# Patient Record
Sex: MALE | Race: WHITE | NOT HISPANIC OR LATINO | Employment: OTHER | URBAN - METROPOLITAN AREA
[De-identification: names, ages, dates, MRNs, and addresses within clinical notes are randomized per-mention and may not be internally consistent; named-entity substitution may affect disease eponyms.]

---

## 2017-03-22 ENCOUNTER — HOSPITAL ENCOUNTER (OUTPATIENT)
Facility: HOSPITAL | Age: 67
Setting detail: OBSERVATION
Discharge: HOME | End: 2017-03-24
Attending: EMERGENCY MEDICINE | Admitting: HOSPITALIST

## 2017-03-22 ENCOUNTER — APPOINTMENT (OUTPATIENT)
Dept: GENERAL RADIOLOGY | Facility: HOSPITAL | Age: 67
End: 2017-03-22

## 2017-03-22 DIAGNOSIS — R07.2 PRECORDIAL CHEST PAIN: ICD-10-CM

## 2017-03-22 DIAGNOSIS — J10.1 INFLUENZA A (H1N1): Primary | ICD-10-CM

## 2017-03-22 LAB
ALBUMIN SERPL-MCNC: 4.3 G/DL (ref 3.5–5.2)
ALBUMIN/GLOB SERPL: 1.7 G/DL
ALP SERPL-CCNC: 69 U/L (ref 39–117)
ALT SERPL W P-5'-P-CCNC: 23 U/L (ref 1–41)
ANION GAP SERPL CALCULATED.3IONS-SCNC: 13.7 MMOL/L
AST SERPL-CCNC: 21 U/L (ref 1–40)
B PERT DNA SPEC QL NAA+PROBE: NOT DETECTED
BASOPHILS # BLD AUTO: 0 10*3/MM3 (ref 0–0.2)
BASOPHILS NFR BLD AUTO: 0 % (ref 0–1.5)
BILIRUB SERPL-MCNC: 0.6 MG/DL (ref 0.1–1.2)
BUN BLD-MCNC: 18 MG/DL (ref 8–23)
BUN/CREAT SERPL: 17.3 (ref 7–25)
C PNEUM DNA NPH QL NAA+NON-PROBE: NOT DETECTED
CALCIUM SPEC-SCNC: 9.4 MG/DL (ref 8.6–10.5)
CHLORIDE SERPL-SCNC: 102 MMOL/L (ref 98–107)
CK MB SERPL-CCNC: 1.32 NG/ML
CK SERPL-CCNC: 53 U/L (ref 20–200)
CO2 SERPL-SCNC: 24.3 MMOL/L (ref 22–29)
CREAT BLD-MCNC: 1.04 MG/DL (ref 0.76–1.27)
D DIMER PPP FEU-MCNC: <0.27 MCGFEU/ML (ref 0–0.49)
D-LACTATE SERPL-SCNC: 0.9 MMOL/L (ref 0.5–2)
DEPRECATED RDW RBC AUTO: 41.4 FL (ref 37–54)
EOSINOPHIL # BLD AUTO: 0 10*3/MM3 (ref 0–0.7)
EOSINOPHIL NFR BLD AUTO: 0 % (ref 0.3–6.2)
ERYTHROCYTE [DISTWIDTH] IN BLOOD BY AUTOMATED COUNT: 12.6 % (ref 11.5–14.5)
FLUAV H1 2009 PAND RNA NPH QL NAA+PROBE: DETECTED
FLUAV H1 HA GENE NPH QL NAA+PROBE: NOT DETECTED
FLUAV H3 RNA NPH QL NAA+PROBE: NOT DETECTED
FLUAV SUBTYP SPEC NAA+PROBE: NOT DETECTED
FLUBV RNA ISLT QL NAA+PROBE: NOT DETECTED
GFR SERPL CREATININE-BSD FRML MDRD: 72 ML/MIN/1.73
GLOBULIN UR ELPH-MCNC: 2.6 GM/DL
GLUCOSE BLD-MCNC: 105 MG/DL (ref 65–99)
HADV DNA SPEC NAA+PROBE: NOT DETECTED
HCOV 229E RNA SPEC QL NAA+PROBE: NOT DETECTED
HCOV HKU1 RNA SPEC QL NAA+PROBE: NOT DETECTED
HCOV NL63 RNA SPEC QL NAA+PROBE: NOT DETECTED
HCOV OC43 RNA SPEC QL NAA+PROBE: NOT DETECTED
HCT VFR BLD AUTO: 42.6 % (ref 40.4–52.2)
HGB BLD-MCNC: 14.4 G/DL (ref 13.7–17.6)
HMPV RNA NPH QL NAA+NON-PROBE: NOT DETECTED
HPIV1 RNA SPEC QL NAA+PROBE: NOT DETECTED
HPIV2 RNA SPEC QL NAA+PROBE: NOT DETECTED
HPIV3 RNA NPH QL NAA+PROBE: NOT DETECTED
HPIV4 P GENE NPH QL NAA+PROBE: NOT DETECTED
IMM GRANULOCYTES # BLD: 0 10*3/MM3 (ref 0–0.03)
IMM GRANULOCYTES NFR BLD: 0 % (ref 0–0.5)
INR PPP: 1.04 (ref 0.9–1.1)
LYMPHOCYTES # BLD AUTO: 0.6 10*3/MM3 (ref 0.9–4.8)
LYMPHOCYTES NFR BLD AUTO: 9.4 % (ref 19.6–45.3)
M PNEUMO IGG SER IA-ACNC: NOT DETECTED
MAGNESIUM SERPL-MCNC: 1.9 MG/DL (ref 1.6–2.4)
MCH RBC QN AUTO: 30.6 PG (ref 27–32.7)
MCHC RBC AUTO-ENTMCNC: 33.8 G/DL (ref 32.6–36.4)
MCV RBC AUTO: 90.4 FL (ref 79.8–96.2)
MONOCYTES # BLD AUTO: 0.91 10*3/MM3 (ref 0.2–1.2)
MONOCYTES NFR BLD AUTO: 14.2 % (ref 5–12)
NEUTROPHILS # BLD AUTO: 4.9 10*3/MM3 (ref 1.9–8.1)
NEUTROPHILS NFR BLD AUTO: 76.4 % (ref 42.7–76)
NT-PROBNP SERPL-MCNC: 35.2 PG/ML (ref 0–900)
PLATELET # BLD AUTO: 127 10*3/MM3 (ref 140–500)
PMV BLD AUTO: 11.5 FL (ref 6–12)
POTASSIUM BLD-SCNC: 4 MMOL/L (ref 3.5–5.2)
PROCALCITONIN SERPL-MCNC: 0.07 NG/ML (ref 0.1–0.25)
PROT SERPL-MCNC: 6.9 G/DL (ref 6–8.5)
PROTHROMBIN TIME: 13.2 SECONDS (ref 11.7–14.2)
RBC # BLD AUTO: 4.71 10*6/MM3 (ref 4.6–6)
RHINOVIRUS RNA SPEC NAA+PROBE: NOT DETECTED
RSV RNA NPH QL NAA+NON-PROBE: NOT DETECTED
SODIUM BLD-SCNC: 140 MMOL/L (ref 136–145)
TROPONIN T SERPL-MCNC: <0.01 NG/ML (ref 0–0.03)
WBC NRBC COR # BLD: 6.41 10*3/MM3 (ref 4.5–10.7)

## 2017-03-22 PROCEDURE — 83880 ASSAY OF NATRIURETIC PEPTIDE: CPT | Performed by: EMERGENCY MEDICINE

## 2017-03-22 PROCEDURE — 85379 FIBRIN DEGRADATION QUANT: CPT | Performed by: EMERGENCY MEDICINE

## 2017-03-22 PROCEDURE — 84145 PROCALCITONIN (PCT): CPT | Performed by: EMERGENCY MEDICINE

## 2017-03-22 PROCEDURE — 84484 ASSAY OF TROPONIN QUANT: CPT | Performed by: EMERGENCY MEDICINE

## 2017-03-22 PROCEDURE — 25010000002 ONDANSETRON PER 1 MG

## 2017-03-22 PROCEDURE — 82550 ASSAY OF CK (CPK): CPT | Performed by: EMERGENCY MEDICINE

## 2017-03-22 PROCEDURE — 94640 AIRWAY INHALATION TREATMENT: CPT

## 2017-03-22 PROCEDURE — 96374 THER/PROPH/DIAG INJ IV PUSH: CPT

## 2017-03-22 PROCEDURE — G0378 HOSPITAL OBSERVATION PER HR: HCPCS

## 2017-03-22 PROCEDURE — 85025 COMPLETE CBC W/AUTO DIFF WBC: CPT | Performed by: EMERGENCY MEDICINE

## 2017-03-22 PROCEDURE — 87040 BLOOD CULTURE FOR BACTERIA: CPT | Performed by: EMERGENCY MEDICINE

## 2017-03-22 PROCEDURE — 96361 HYDRATE IV INFUSION ADD-ON: CPT

## 2017-03-22 PROCEDURE — 25010000002 MORPHINE PER 10 MG: Performed by: HOSPITALIST

## 2017-03-22 PROCEDURE — 94799 UNLISTED PULMONARY SVC/PX: CPT

## 2017-03-22 PROCEDURE — 96376 TX/PRO/DX INJ SAME DRUG ADON: CPT

## 2017-03-22 PROCEDURE — 82553 CREATINE MB FRACTION: CPT | Performed by: EMERGENCY MEDICINE

## 2017-03-22 PROCEDURE — 87486 CHLMYD PNEUM DNA AMP PROBE: CPT | Performed by: EMERGENCY MEDICINE

## 2017-03-22 PROCEDURE — 87633 RESP VIRUS 12-25 TARGETS: CPT | Performed by: EMERGENCY MEDICINE

## 2017-03-22 PROCEDURE — 25810000003 SODIUM CHLORIDE 0.9 % WITH KCL 20 MEQ 20-0.9 MEQ/L-% SOLUTION: Performed by: HOSPITALIST

## 2017-03-22 PROCEDURE — 93010 ELECTROCARDIOGRAM REPORT: CPT | Performed by: INTERNAL MEDICINE

## 2017-03-22 PROCEDURE — 71010 HC CHEST PA OR AP: CPT

## 2017-03-22 PROCEDURE — 93005 ELECTROCARDIOGRAM TRACING: CPT

## 2017-03-22 PROCEDURE — 83735 ASSAY OF MAGNESIUM: CPT | Performed by: EMERGENCY MEDICINE

## 2017-03-22 PROCEDURE — 99285 EMERGENCY DEPT VISIT HI MDM: CPT

## 2017-03-22 PROCEDURE — 83605 ASSAY OF LACTIC ACID: CPT | Performed by: EMERGENCY MEDICINE

## 2017-03-22 PROCEDURE — 87581 M.PNEUMON DNA AMP PROBE: CPT | Performed by: EMERGENCY MEDICINE

## 2017-03-22 PROCEDURE — 25010000002 MORPHINE PER 10 MG

## 2017-03-22 PROCEDURE — 80053 COMPREHEN METABOLIC PANEL: CPT | Performed by: EMERGENCY MEDICINE

## 2017-03-22 PROCEDURE — 87798 DETECT AGENT NOS DNA AMP: CPT | Performed by: EMERGENCY MEDICINE

## 2017-03-22 PROCEDURE — 85610 PROTHROMBIN TIME: CPT | Performed by: EMERGENCY MEDICINE

## 2017-03-22 RX ORDER — OSELTAMIVIR PHOSPHATE 75 MG/1
75 CAPSULE ORAL ONCE
Status: COMPLETED | OUTPATIENT
Start: 2017-03-22 | End: 2017-03-22

## 2017-03-22 RX ORDER — OSELTAMIVIR PHOSPHATE 75 MG/1
75 CAPSULE ORAL EVERY 12 HOURS SCHEDULED
Status: DISCONTINUED | OUTPATIENT
Start: 2017-03-22 | End: 2017-03-24 | Stop reason: HOSPADM

## 2017-03-22 RX ORDER — BENZONATATE 100 MG/1
200 CAPSULE ORAL 3 TIMES DAILY PRN
COMMUNITY
End: 2022-08-12

## 2017-03-22 RX ORDER — NITROGLYCERIN 0.4 MG/1
0.4 TABLET SUBLINGUAL
Status: DISCONTINUED | OUTPATIENT
Start: 2017-03-22 | End: 2017-03-23

## 2017-03-22 RX ORDER — IPRATROPIUM BROMIDE AND ALBUTEROL SULFATE 2.5; .5 MG/3ML; MG/3ML
3 SOLUTION RESPIRATORY (INHALATION)
Status: DISCONTINUED | OUTPATIENT
Start: 2017-03-22 | End: 2017-03-24 | Stop reason: HOSPADM

## 2017-03-22 RX ORDER — ACETAMINOPHEN 500 MG
1000 TABLET ORAL ONCE
Status: COMPLETED | OUTPATIENT
Start: 2017-03-22 | End: 2017-03-22

## 2017-03-22 RX ORDER — PREDNISONE 20 MG/1
20 TABLET ORAL 2 TIMES DAILY
COMMUNITY
Start: 2017-03-18 | End: 2017-03-24 | Stop reason: HOSPADM

## 2017-03-22 RX ORDER — ONDANSETRON 2 MG/ML
4 INJECTION INTRAMUSCULAR; INTRAVENOUS EVERY 6 HOURS PRN
Status: DISCONTINUED | OUTPATIENT
Start: 2017-03-22 | End: 2017-03-24 | Stop reason: HOSPADM

## 2017-03-22 RX ORDER — ZOLPIDEM TARTRATE 5 MG/1
5 TABLET ORAL NIGHTLY PRN
Status: DISCONTINUED | OUTPATIENT
Start: 2017-03-22 | End: 2017-03-24 | Stop reason: HOSPADM

## 2017-03-22 RX ORDER — SENNOSIDES 8.6 MG
325 CAPSULE ORAL EVERY 4 HOURS PRN
COMMUNITY
End: 2022-08-12

## 2017-03-22 RX ORDER — ACETAMINOPHEN 325 MG/1
650 TABLET ORAL EVERY 6 HOURS PRN
Status: DISCONTINUED | OUTPATIENT
Start: 2017-03-22 | End: 2017-03-24 | Stop reason: HOSPADM

## 2017-03-22 RX ORDER — SODIUM CHLORIDE AND POTASSIUM CHLORIDE 150; 900 MG/100ML; MG/100ML
75 INJECTION, SOLUTION INTRAVENOUS CONTINUOUS
Status: DISCONTINUED | OUTPATIENT
Start: 2017-03-22 | End: 2017-03-24 | Stop reason: HOSPADM

## 2017-03-22 RX ORDER — PANTOPRAZOLE SODIUM 40 MG/1
40 TABLET, DELAYED RELEASE ORAL
Status: DISCONTINUED | OUTPATIENT
Start: 2017-03-23 | End: 2017-03-24 | Stop reason: HOSPADM

## 2017-03-22 RX ORDER — ONDANSETRON 2 MG/ML
4 INJECTION INTRAMUSCULAR; INTRAVENOUS ONCE
Status: COMPLETED | OUTPATIENT
Start: 2017-03-22 | End: 2017-03-22

## 2017-03-22 RX ORDER — ASPIRIN 81 MG/1
81 TABLET, CHEWABLE ORAL DAILY
Status: DISCONTINUED | OUTPATIENT
Start: 2017-03-22 | End: 2017-03-24 | Stop reason: HOSPADM

## 2017-03-22 RX ORDER — ONDANSETRON 2 MG/ML
INJECTION INTRAMUSCULAR; INTRAVENOUS
Status: COMPLETED
Start: 2017-03-22 | End: 2017-03-22

## 2017-03-22 RX ORDER — ALBUTEROL SULFATE 2.5 MG/3ML
2.5 SOLUTION RESPIRATORY (INHALATION) EVERY 4 HOURS PRN
COMMUNITY
End: 2022-08-12

## 2017-03-22 RX ORDER — ACETAMINOPHEN 500 MG
1000 TABLET ORAL ONCE
Status: DISCONTINUED | OUTPATIENT
Start: 2017-03-22 | End: 2017-03-22

## 2017-03-22 RX ORDER — MORPHINE SULFATE 2 MG/ML
2 INJECTION, SOLUTION INTRAMUSCULAR; INTRAVENOUS EVERY 4 HOURS PRN
Status: DISCONTINUED | OUTPATIENT
Start: 2017-03-22 | End: 2017-03-23

## 2017-03-22 RX ORDER — ZOLPIDEM TARTRATE 5 MG/1
TABLET ORAL NIGHTLY PRN
COMMUNITY
Start: 2016-06-11 | End: 2022-08-12

## 2017-03-22 RX ADMIN — ONDANSETRON 4 MG: 2 INJECTION INTRAMUSCULAR; INTRAVENOUS at 11:49

## 2017-03-22 RX ADMIN — OSELTAMIVIR PHOSPHATE 75 MG: 75 CAPSULE ORAL at 12:38

## 2017-03-22 RX ADMIN — ZOLPIDEM TARTRATE 5 MG: 5 TABLET, FILM COATED ORAL at 20:57

## 2017-03-22 RX ADMIN — IPRATROPIUM BROMIDE AND ALBUTEROL SULFATE 3 ML: .5; 3 SOLUTION RESPIRATORY (INHALATION) at 22:51

## 2017-03-22 RX ADMIN — NITROGLYCERIN 0.4 MG: 0.4 TABLET SUBLINGUAL at 10:32

## 2017-03-22 RX ADMIN — Medication 4 MG: at 11:50

## 2017-03-22 RX ADMIN — MORPHINE SULFATE 2 MG: 2 INJECTION, SOLUTION INTRAMUSCULAR; INTRAVENOUS at 15:48

## 2017-03-22 RX ADMIN — ACETAMINOPHEN 1000 MG: 500 TABLET ORAL at 10:30

## 2017-03-22 RX ADMIN — NITROGLYCERIN 1 INCH: 20 OINTMENT TOPICAL at 23:03

## 2017-03-22 RX ADMIN — MORPHINE SULFATE 4 MG: 4 INJECTION, SOLUTION INTRAMUSCULAR; INTRAVENOUS at 11:50

## 2017-03-22 RX ADMIN — IPRATROPIUM BROMIDE AND ALBUTEROL SULFATE 3 ML: .5; 3 SOLUTION RESPIRATORY (INHALATION) at 19:11

## 2017-03-22 RX ADMIN — OSELTAMIVIR PHOSPHATE 75 MG: 75 CAPSULE ORAL at 20:58

## 2017-03-22 RX ADMIN — IPRATROPIUM BROMIDE AND ALBUTEROL SULFATE 3 ML: .5; 3 SOLUTION RESPIRATORY (INHALATION) at 15:31

## 2017-03-22 RX ADMIN — ACETAMINOPHEN 650 MG: 325 TABLET ORAL at 20:57

## 2017-03-22 RX ADMIN — NITROGLYCERIN 0.4 MG: 0.4 TABLET SUBLINGUAL at 10:44

## 2017-03-22 RX ADMIN — NITROGLYCERIN 1 INCH: 20 OINTMENT TOPICAL at 11:51

## 2017-03-22 RX ADMIN — POTASSIUM CHLORIDE AND SODIUM CHLORIDE 75 ML/HR: 900; 150 INJECTION, SOLUTION INTRAVENOUS at 18:28

## 2017-03-22 NOTE — H&P
CHIEF COMPLAINT: Chest pain.     HISTORY: A 66-year-old white male with no significant past medical history except asthma, osteoarthritis, and low back pain, who presented to the emergency room with left-sided chest discomfort that started yesterday. He also has a nonproductive cough, some shortness of breath, fever and decongestion for the last 3 days. The patient was evaluated in the ER and found to have acute influenza A.  Admitted for management and needs to rule out acute coronary syndrome for chest pain, which I doubt it is cardiac in origin. No fever or chills except for 1 day, but this cough, congestion, and generalized weakness is getting worse.     PAST MEDICAL HISTORY:  1. Osteoarthritis.    2. Hip bursitis.    3. Low back pain.     SOCIAL HISTORY:   Ex-smoker. No recent tobacco, alcohol, or drug abuse.     FAMILY HISTORY: Noncontributory.     ALLERGIES: CODEINE     HOME MEDICATIONS:  1. Tussionex.  2. Advil.    3. Tylenol.    4. Inhaler.    5. Recently started on antibiotics     PHYSICAL EXAMINATION:  GENERAL: Alert, oriented, in no respiratory distress.   VITAL SIGNS: Maximum temperature 99.0, 100.3 earlier, pulse 64, respirations 14, and blood pressure 117/72.   HEENT: Unremarkable.   NECK: Supple.   LUNGS: Decreased breath sounds.   HEART: S1 and S2.  CHEST:  The pain is reproducible in the left side, not the chest.   ABDOMEN: Soft, nontender. Bowel sounds positive.   EXTREMITIES: No edema.   NEUROLOGICAL: No focal deficit.     DIAGNOSTIC DATA: CK-MB and troponin are normal. Chemistries normal.  Glucose 105, potassium 4.0. CBC normal. Magnesium 1.9. D-dimer less than 0.27. Respiratory panel is positive for influenza H1, N1. Chest x-ray: No active disease.  EKG: Sinus rhythm with nonspecific ST-T wave changes.     ASSESSMENT:  1. Acute influenza A.   2. Chest pain, doubt cardiac in origin.   3. Osteoarthritis.   4. Low back pain.     PLAN:   1. Admit.   2. Start oxygen, nitroglycerin, and aspirin.    3. Start Tamiflu.   4. IV fluids.   5. Cardiology to see.   6. Check echo. May need a stress test once ruled out for MI.   7. Repeat troponin and EKG.   8. Symptomatic treatment for cough and congestion.   9. Follow closely.   10. Further recommendations according to hospital course.       Asher Long M.D.  AA:curtis  D:   03/22/2017 15:20:52  T:   03/22/2017 15:49:31  Job ID:   [id number]  Document ID:   [id number]  cc:

## 2017-03-22 NOTE — ED PROVIDER NOTES
EMERGENCY DEPARTMENT ENCOUNTER    CHIEF COMPLAINT  Chief Complaint: Chest Pain  History given by: Patient  History limited by:   Room Number: 32/32  PMD: Jerald Gonzalez MD      HPI:  Pt is a 66 y.o. male who presents complaining of chest pain that onset today at 0800. Pt was recently dx with bronchitis recently after having SOA, diaphoresis, fever, and cough. Pt describes the CP as a tightness to the central chest with SOA. He denies any radiation. Pt denies any hx of heart issues, but had a previous normal cath. Pt was seen at [facility] today and referred to the ED. Pt was given 4x ASA at [facility].    Duration:  2 hours  Onset: sudden  Timing: constant  Location: central chest  Radiation: none  Quality: tightness  Intensity/Severity: moderate  Progression: unchanged  Associated Symptoms: SOA, diaphoresis, cough, fever  Aggravating Factors: none  Alleviating Factors: none  Previous Episodes: none  Treatment before arrival: seen at [facility] and referred to ED.    PAST MEDICAL HISTORY  Active Ambulatory Problems     Diagnosis Date Noted   • Arthritis of left ankle 08/19/2016   • Hip bursitis 08/19/2016   • Osteoarthritis of spine with radiculopathy, lumbar region 08/19/2016   • Labral tear of hip, degenerative 08/19/2016     Resolved Ambulatory Problems     Diagnosis Date Noted   • No Resolved Ambulatory Problems     Past Medical History:   Diagnosis Date   • Asthma    • Pancreas (digestive gland) works poorly    • Skin cancer    • Sleep apnea        PAST SURGICAL HISTORY  Past Surgical History:   Procedure Laterality Date   • BACK SURGERY     • CHOLECYSTECTOMY     • EYELID LACERATION REPAIR     • HEMORRHOIDECTOMY     • PANCREAS SURGERY     • SHOULDER SURGERY     • TUMOR REMOVAL      [place]       FAMILY HISTORY  Family History   Problem Relation Age of Onset   • No Known Problems Mother    • No Known Problems Father    • No Known Problems Sister    • No Known Problems Brother    • No Known Problems Maternal Aunt    • No  Known Problems Maternal Uncle    • No Known Problems Paternal Aunt    • No Known Problems Paternal Uncle    • No Known Problems Maternal Grandmother    • No Known Problems Maternal Grandfather    • No Known Problems Paternal Grandmother    • No Known Problems Paternal Grandfather    • Cancer Other    • Anesthesia problems Neg Hx    • Broken bones Neg Hx    • Clotting disorder Neg Hx    • Collagen disease Neg Hx    • Diabetes Neg Hx    • Dislocations Neg Hx    • Osteoporosis Neg Hx    • Rheumatologic disease Neg Hx    • Scoliosis Neg Hx    • Severe sprains Neg Hx        SOCIAL HISTORY  Social History     Social History   • Marital status:      Spouse name: N/A   • Number of children: N/A   • Years of education: N/A     Occupational History   • Not on file.     Social History Main Topics   • Smoking status: Former Smoker     Types: Pipe     Quit date: 1980   • Smokeless tobacco: Not on file   • Alcohol use Yes      Comment: OCCASIONAL   • Drug use: No   • Sexual activity: Defer     Other Topics Concern   • Not on file     Social History Narrative   • No narrative on file       ALLERGIES  Codeine    REVIEW OF SYSTEMS  Review of Systems   Constitutional: Positive for diaphoresis and fever. Negative for activity change and appetite change.   HENT: Negative for congestion and sore throat.    Eyes: Negative.    Respiratory: Positive for cough and shortness of breath.    Cardiovascular: Positive for chest pain. Negative for leg swelling.   Gastrointestinal: Negative for abdominal pain, diarrhea and vomiting.   Endocrine: Negative.    Genitourinary: Negative for decreased urine volume and dysuria.   Musculoskeletal: Negative for neck pain.   Skin: Negative for rash and wound.   Allergic/Immunologic: Negative.    Neurological: Negative for weakness, numbness and headaches.   Hematological: Negative.    Psychiatric/Behavioral: Negative.    All other systems reviewed and are negative.      PHYSICAL EXAM  ED Triage Vitals    Temp Heart Rate Resp BP SpO2   -- 03/22/17 1000 03/22/17 1000 03/22/17 1000 03/22/17 1000    96 20 140/80 97 %      Temp src Heart Rate Source Patient Position BP Location FiO2 (%)   -- -- -- -- --              Physical Exam   Constitutional: He is oriented to person, place, and time and well-developed, well-nourished, and in no distress.   In pain   HENT:   Head: Normocephalic and atraumatic.   Eyes: EOM are normal. Pupils are equal, round, and reactive to light.   Neck: Normal range of motion. Neck supple. No JVD present.   Cardiovascular: Normal rate, regular rhythm and normal heart sounds.    Pulmonary/Chest: Effort normal. No respiratory distress. He has no wheezes. He has rhonchi in the right lower field. He has no rales.   Abdominal: Soft. There is no tenderness. There is no rebound and no guarding.   Musculoskeletal: Normal range of motion. He exhibits no edema.   Neurological: He is alert and oriented to person, place, and time. He has normal sensation and normal strength.   Skin: Skin is warm and dry.   Psychiatric: Mood and affect normal.   Nursing note and vitals reviewed.      LAB RESULTS  Lab Results (last 24 hours)     Procedure Component Value Units Date/Time    CBC & Differential [[id number]] Collected:  03/22/17 1035    Specimen:  Blood Updated:  03/22/17 1047    Narrative:       The following orders were created for panel order CBC & Differential.  Procedure                               Abnormality         Status                     ---------                               -----------         ------                     CBC Auto Differential[[id number]]         Abnormal            Final result                 Please view results for these tests on the individual orders.    Comprehensive Metabolic Panel [[id number]]  (Abnormal) Collected:  03/22/17 1035    Specimen:  Blood Updated:  03/22/17 1112     Glucose 105 (H) mg/dL      BUN 18 mg/dL      Creatinine 1.04 mg/dL      Sodium 140 mmol/L      Potassium  4.0 mmol/L      Chloride 102 mmol/L      CO2 24.3 mmol/L      Calcium 9.4 mg/dL      Total Protein 6.9 g/dL      Albumin 4.30 g/dL      ALT (SGPT) 23 U/L      AST (SGOT) 21 U/L      Alkaline Phosphatase 69 U/L      Total Bilirubin 0.6 mg/dL      eGFR Non African Amer 72 mL/min/1.73      Globulin 2.6 gm/dL      A/G Ratio 1.7 g/dL      BUN/Creatinine Ratio 17.3     Anion Gap 13.7 mmol/L     Protime-INR [[id number]]  (Normal) Collected:  03/22/17 1035    Specimen:  Blood Updated:  03/22/17 1103     Protime 13.2 Seconds      INR 1.04    D-dimer, Quantitative [[id number]]  (Normal) Collected:  03/22/17 1035    Specimen:  Blood Updated:  03/22/17 1103     D-Dimer, Quantitative <0.27 MCGFEU/mL     Narrative:       The Stago D-Dimer test used in conjunction with a clinical pretest probability (PTP) assessment model, has been approved by the FDA to rule out the presence of venous thromboembolism (VTE) in outpatients suspected of deep venous thrombosis (DVT) or pulmonary embolism (PE).     BNP [[id number]]  (Normal) Collected:  03/22/17 1035    Specimen:  Blood Updated:  03/22/17 1114     proBNP 35.2 pg/mL     Narrative:       Among patients with dyspnea, NT-proBNP is highly sensitive for the detection of acute congestive heart failure. In addition NT-proBNP of <300 pg/ml effectively rules out acute congestive heart failure with 99% negative predictive value.    CK [[id number]]  (Normal) Collected:  03/22/17 1035    Specimen:  Blood Updated:  03/22/17 1112     Creatine Kinase 53 U/L     CK-MB [[id number]]  (Normal) Collected:  03/22/17 1035    Specimen:  Blood Updated:  03/22/17 1114     CKMB 1.32 ng/mL     Troponin [[id number]]  (Normal) Collected:  03/22/17 1035    Specimen:  Blood Updated:  03/22/17 1114     Troponin T <0.010 ng/mL     Narrative:       Troponin T Reference Ranges:  Less than 0.03 ng/mL:    Negative for AMI  0.03 to 0.09 ng/mL:      Indeterminant for AMI  Greater than 0.09 ng/mL: Positive for AMI    Magnesium  "[[id number]]  (Normal) Collected:  03/22/17 1035    Specimen:  Blood Updated:  03/22/17 1112     Magnesium 1.9 mg/dL     Procalcitonin [[id number]]  (Abnormal) Collected:  03/22/17 1035    Specimen:  Blood Updated:  03/22/17 1114     Procalcitonin 0.07 (L) ng/mL     Narrative:       As a Marker for Sepsis (Non-Neonates):   1. <0.5 ng/mL represents a low risk of severe sepsis and/or septic shock.  1. >2 ng/mL represents a high risk of severe sepsis and/or septic shock.    As a Marker for Lower Respiratory Tract Infections that require antibiotic therapy:  PCT on Admission     Antibiotic Therapy             6-12 Hrs later  > 0.5                Strongly Recommended            >0.25 - <0.5         Recommended  0.1 - 0.25           Discouraged                   Remeasure/reassess PCT  <0.1                 Strongly Discouraged          Remeasure/reassess PCT      As 28 day mortality risk marker: \"Change in Procalcitonin Result\" (> 80 % or <=80 %) if Day 0 (or Day 1) and Day 4 values are available. Refer to http://www.[employer]-pct-calculator.com/   Change in PCT <=80 %   A decrease of PCT levels below or equal to 80 % defines a positive change in PCT test result representing a higher risk for 28-day all-cause mortality of patients diagnosed with severe sepsis or septic shock.  Change in PCT > 80 %   A decrease of PCT levels of more than 80 % defines a negative change in PCT result representing a lower risk for 28-day all-cause mortality of patients diagnosed with severe sepsis or septic shock.                Lactic Acid, Plasma [[id number]]  (Normal) Collected:  03/22/17 1035    Specimen:  Blood Updated:  03/22/17 1054     Lactate 0.9 mmol/L     CBC Auto Differential [[id number]]  (Abnormal) Collected:  03/22/17 1035    Specimen:  Blood Updated:  03/22/17 1047     WBC 6.41 10*3/mm3      RBC 4.71 10*6/mm3      Hemoglobin 14.4 g/dL      Hematocrit 42.6 %      MCV 90.4 fL      MCH 30.6 pg      MCHC 33.8 g/dL      RDW 12.6 %      RDW-SD " 41.4 fl      MPV 11.5 fL      Platelets 127 (L) 10*3/mm3      Neutrophil % 76.4 (H) %      Lymphocyte % 9.4 (L) %      Monocyte % 14.2 (H) %      Eosinophil % 0.0 (L) %      Basophil % 0.0 %      Immature Grans % 0.0 %      Neutrophils, Absolute 4.90 10*3/mm3      Lymphocytes, Absolute 0.60 (L) 10*3/mm3      Monocytes, Absolute 0.91 10*3/mm3      Eosinophils, Absolute 0.00 10*3/mm3      Basophils, Absolute 0.00 10*3/mm3      Immature Grans, Absolute 0.00 10*3/mm3     Respiratory Panel, PCR [[id number]]  (Abnormal) Collected:  03/22/17 1042    Specimen:  Swab from Nares Updated:  03/22/17 1222     ADENOVIRUS, PCR Not Detected     Coronavirus 229E Not Detected     Coronavirus HKU1 Not Detected     Coronavirus NL63 Not Detected     Coronavirus OC43 Not Detected     Human Metapneumovirus Not Detected     Human Rhinovirus/Enterovirus Not Detected     Influenza B PCR Not Detected     Parainfluenza Virus 1 Not Detected     Parainfluenza Virus 2 Not Detected     Parainfluenza Virus 3 Not Detected     Parainfluenza Virus 4 Not Detected     Bordetella pertussis pcr Not Detected     Influenza 2009 H1N1 by PCR Detected (A)     Chlamydophila pneumoniae PCR Not Detected     Mycoplasma pneumo by PCR Not Detected     Influenza A PCR Not Detected     Influenza A H3 Not Detected     Influenza A H1 Not Detected     RSV, PCR Not Detected    Blood Culture [[id number]] Collected:  03/22/17 1059    Specimen:  Blood from Arm, Left Updated:  03/22/17 1101    Blood Culture [[id number]] Collected:  03/22/17 1059    Specimen:  Blood from Arm, Left Updated:  03/22/17 1102          I ordered the above labs and reviewed the results    RADIOLOGY  XR Chest 1 View   Final Result   Final result by Thuan Das MD (03/22/17 11:08:29)     Narrative:     [facility] CHEST X-RAY     CLINICAL HISTORY: Chest pain. Asthma.     Compared to the previous chest x-ray dated 10/07/2010.     The lungs are somewhat poorly inflated. There is mild bibasilar  atelectasis. There  are no acute focal infiltrates or pleural effusions.  The heart is normal in size.     IMPRESSIONS: No evidence of acute disease within the chest.     This report was finalized on 3/22/2017 11:08 AM by Dr. Thuan Das MD.             I ordered the above noted radiological studies. Interpreted by radiologist. Reviewed by me in PACS.       PROCEDURES  Procedures  EKG    EKG time: 1005  Rhythm/Rate: NSR, 88BPM  No Acute Ischemia  Non-Specific ST-T changes  RBBB    changed compared to prior on 2010 - RBBB is new    Interpreted Contemporaneously by me.  Independently viewed by me      PROGRESS AND CONSULTS  ED Course   Value Comment By Time   Influenza 2009 H1N1 by PCR: (!) Detected (Reviewed) Tan Regalado MD 03/22 3937   10:19 AM  Ordered CXR, blood work. Ordered SL NTG. Ordered Tylenol for fever and headache.  11:46 AM  Pt reports still having CP and headache. Will give NTG paste and Morphine.  12:01 PM  Ordered CTA chest.  12:24 PM  Rechecked with pt. He reports some improvement of pain with NTG. Informed pt of his positive flu screen. Informed pt of plan to discuss with cardiology and need for possible admission. Pt understands and agrees with plan. All concerns addressed.  12:28 PM  CTA cancelled in light of his positive flu swab.  1:12 PM  Discussed pt with Dr. Peñaloza (Cardiologist). Agrees on plan to admit and will consult.  Time 1:43 PM  Discussed case with Dr Long (Hospitalist)  Reviewed history, exam, results and treatments.  Discussed concerns and plan of care. Dr Long accepts pt to be admitted to telemetry.      MEDICAL DECISION MAKING    MDM  Number of Diagnoses or Management Options  Influenza A (H1N1):   Precordial chest pain:      Amount and/or Complexity of Data Reviewed  Clinical lab tests: ordered and reviewed (Respiratory panel - Flu A positive)  Tests in the radiology section of CPT®: ordered and reviewed  Tests in the medicine section of CPT®: reviewed and ordered (EKG - See EKG procedure  note  )  Review and summarize past medical records: yes (Recently dx with bronchitis. Seen by Dr. Restrepo 11/2016. No previous cardiac hx. Negative heart cath in 2009. RBBB was present on EKG at that time.)  Discuss the patient with other providers: yes (Dr. Peñaloza, Dr. Long)           DIAGNOSIS  Final diagnoses:   Influenza A (H1N1)   Precordial chest pain       DISPOSITION  ADMISSION    Discussed treatment plan and reason for admission with pt/family and admitting physician.  Pt/family voiced understanding of the plan for admission for further testing/treatment as needed.         Latest Documented Vital Signs:  As of 1:49 PM  BP- 117/72 HR- 84 Temp- 99 °F (37.2 °C) (Tympanic) O2 sat- 95%    --  Documentation assistance provided by ivory Barrios for Dr. Regalado.  Information recorded by the scribe was done at my direction and has been verified and validated by me.     Rhett Barrios  03/22/17 1239       Rhett Barrios  03/22/17 1313       Rhett Barrios  03/22/17 1344       Tan Regalado MD  03/22/17 6105

## 2017-03-23 LAB
ALBUMIN SERPL-MCNC: 3.6 G/DL (ref 3.5–5.2)
ALBUMIN/GLOB SERPL: 1.6 G/DL
ALP SERPL-CCNC: 58 U/L (ref 39–117)
ALT SERPL W P-5'-P-CCNC: 18 U/L (ref 1–41)
ANION GAP SERPL CALCULATED.3IONS-SCNC: 13 MMOL/L
AST SERPL-CCNC: 18 U/L (ref 1–40)
BASOPHILS # BLD AUTO: 0.01 10*3/MM3 (ref 0–0.2)
BASOPHILS NFR BLD AUTO: 0.2 % (ref 0–1.5)
BILIRUB SERPL-MCNC: 0.4 MG/DL (ref 0.1–1.2)
BUN BLD-MCNC: 15 MG/DL (ref 8–23)
BUN/CREAT SERPL: 16.7 (ref 7–25)
CALCIUM SPEC-SCNC: 8.3 MG/DL (ref 8.6–10.5)
CHLORIDE SERPL-SCNC: 100 MMOL/L (ref 98–107)
CHOLEST SERPL-MCNC: 111 MG/DL (ref 0–200)
CO2 SERPL-SCNC: 22 MMOL/L (ref 22–29)
CREAT BLD-MCNC: 0.9 MG/DL (ref 0.76–1.27)
DEPRECATED RDW RBC AUTO: 43 FL (ref 37–54)
EOSINOPHIL # BLD AUTO: 0 10*3/MM3 (ref 0–0.7)
EOSINOPHIL NFR BLD AUTO: 0 % (ref 0.3–6.2)
ERYTHROCYTE [DISTWIDTH] IN BLOOD BY AUTOMATED COUNT: 12.9 % (ref 11.5–14.5)
GFR SERPL CREATININE-BSD FRML MDRD: 85 ML/MIN/1.73
GLOBULIN UR ELPH-MCNC: 2.2 GM/DL
GLUCOSE BLD-MCNC: 109 MG/DL (ref 65–99)
HBA1C MFR BLD: 5.2 % (ref 4.8–5.6)
HCT VFR BLD AUTO: 38.3 % (ref 40.4–52.2)
HDLC SERPL-MCNC: 19 MG/DL (ref 40–60)
HGB BLD-MCNC: 12.7 G/DL (ref 13.7–17.6)
IMM GRANULOCYTES # BLD: 0 10*3/MM3 (ref 0–0.03)
IMM GRANULOCYTES NFR BLD: 0 % (ref 0–0.5)
LDLC SERPL CALC-MCNC: 73 MG/DL (ref 0–100)
LDLC/HDLC SERPL: 3.83 {RATIO}
LYMPHOCYTES # BLD AUTO: 1.22 10*3/MM3 (ref 0.9–4.8)
LYMPHOCYTES NFR BLD AUTO: 25.7 % (ref 19.6–45.3)
MCH RBC QN AUTO: 30.6 PG (ref 27–32.7)
MCHC RBC AUTO-ENTMCNC: 33.2 G/DL (ref 32.6–36.4)
MCV RBC AUTO: 92.3 FL (ref 79.8–96.2)
MONOCYTES # BLD AUTO: 0.82 10*3/MM3 (ref 0.2–1.2)
MONOCYTES NFR BLD AUTO: 17.3 % (ref 5–12)
NEUTROPHILS # BLD AUTO: 2.69 10*3/MM3 (ref 1.9–8.1)
NEUTROPHILS NFR BLD AUTO: 56.8 % (ref 42.7–76)
NT-PROBNP SERPL-MCNC: 34.7 PG/ML (ref 5–900)
PLATELET # BLD AUTO: 113 10*3/MM3 (ref 140–500)
PMV BLD AUTO: 10.8 FL (ref 6–12)
POTASSIUM BLD-SCNC: 3.9 MMOL/L (ref 3.5–5.2)
PROT SERPL-MCNC: 5.8 G/DL (ref 6–8.5)
RBC # BLD AUTO: 4.15 10*6/MM3 (ref 4.6–6)
SODIUM BLD-SCNC: 135 MMOL/L (ref 136–145)
TRIGL SERPL-MCNC: 96 MG/DL (ref 0–150)
TROPONIN T SERPL-MCNC: <0.01 NG/ML (ref 0–0.03)
TSH SERPL DL<=0.05 MIU/L-ACNC: 0.78 MIU/ML (ref 0.27–4.2)
VLDLC SERPL-MCNC: 19.2 MG/DL (ref 5–40)
WBC NRBC COR # BLD: 4.74 10*3/MM3 (ref 4.5–10.7)

## 2017-03-23 PROCEDURE — 94799 UNLISTED PULMONARY SVC/PX: CPT

## 2017-03-23 PROCEDURE — 83036 HEMOGLOBIN GLYCOSYLATED A1C: CPT | Performed by: HOSPITALIST

## 2017-03-23 PROCEDURE — 93010 ELECTROCARDIOGRAM REPORT: CPT | Performed by: INTERNAL MEDICINE

## 2017-03-23 PROCEDURE — 80061 LIPID PANEL: CPT | Performed by: HOSPITALIST

## 2017-03-23 PROCEDURE — 83880 ASSAY OF NATRIURETIC PEPTIDE: CPT | Performed by: HOSPITALIST

## 2017-03-23 PROCEDURE — 80053 COMPREHEN METABOLIC PANEL: CPT | Performed by: HOSPITALIST

## 2017-03-23 PROCEDURE — 84484 ASSAY OF TROPONIN QUANT: CPT | Performed by: HOSPITALIST

## 2017-03-23 PROCEDURE — 25810000003 SODIUM CHLORIDE 0.9 % WITH KCL 20 MEQ 20-0.9 MEQ/L-% SOLUTION: Performed by: HOSPITALIST

## 2017-03-23 PROCEDURE — 84443 ASSAY THYROID STIM HORMONE: CPT | Performed by: HOSPITALIST

## 2017-03-23 PROCEDURE — 99253 IP/OBS CNSLTJ NEW/EST LOW 45: CPT | Performed by: INTERNAL MEDICINE

## 2017-03-23 PROCEDURE — 85025 COMPLETE CBC W/AUTO DIFF WBC: CPT | Performed by: HOSPITALIST

## 2017-03-23 PROCEDURE — G0378 HOSPITAL OBSERVATION PER HR: HCPCS

## 2017-03-23 PROCEDURE — 96361 HYDRATE IV INFUSION ADD-ON: CPT

## 2017-03-23 PROCEDURE — 93005 ELECTROCARDIOGRAM TRACING: CPT | Performed by: HOSPITALIST

## 2017-03-23 RX ORDER — GUAIFENESIN 600 MG/1
600 TABLET, EXTENDED RELEASE ORAL 2 TIMES DAILY
Status: DISCONTINUED | OUTPATIENT
Start: 2017-03-23 | End: 2017-03-24 | Stop reason: HOSPADM

## 2017-03-23 RX ORDER — BISACODYL 10 MG
10 SUPPOSITORY, RECTAL RECTAL NIGHTLY PRN
Status: DISCONTINUED | OUTPATIENT
Start: 2017-03-23 | End: 2017-03-24 | Stop reason: HOSPADM

## 2017-03-23 RX ORDER — DOCUSATE SODIUM 100 MG/1
100 CAPSULE, LIQUID FILLED ORAL 2 TIMES DAILY PRN
Status: DISCONTINUED | OUTPATIENT
Start: 2017-03-23 | End: 2017-03-24 | Stop reason: HOSPADM

## 2017-03-23 RX ADMIN — METOPROLOL TARTRATE 12.5 MG: 25 TABLET ORAL at 21:14

## 2017-03-23 RX ADMIN — ACETAMINOPHEN 650 MG: 325 TABLET ORAL at 13:09

## 2017-03-23 RX ADMIN — ACETAMINOPHEN 650 MG: 325 TABLET ORAL at 05:51

## 2017-03-23 RX ADMIN — IPRATROPIUM BROMIDE AND ALBUTEROL SULFATE 3 ML: .5; 3 SOLUTION RESPIRATORY (INHALATION) at 03:19

## 2017-03-23 RX ADMIN — IPRATROPIUM BROMIDE AND ALBUTEROL SULFATE 3 ML: .5; 3 SOLUTION RESPIRATORY (INHALATION) at 14:30

## 2017-03-23 RX ADMIN — NITROGLYCERIN 1 INCH: 20 OINTMENT TOPICAL at 05:51

## 2017-03-23 RX ADMIN — OSELTAMIVIR PHOSPHATE 75 MG: 75 CAPSULE ORAL at 21:14

## 2017-03-23 RX ADMIN — GUAIFENESIN 600 MG: 600 TABLET, EXTENDED RELEASE ORAL at 17:16

## 2017-03-23 RX ADMIN — POTASSIUM CHLORIDE AND SODIUM CHLORIDE 75 ML/HR: 900; 150 INJECTION, SOLUTION INTRAVENOUS at 21:12

## 2017-03-23 RX ADMIN — ASPIRIN 81 MG: 81 TABLET, CHEWABLE ORAL at 09:00

## 2017-03-23 RX ADMIN — IPRATROPIUM BROMIDE AND ALBUTEROL SULFATE 3 ML: .5; 3 SOLUTION RESPIRATORY (INHALATION) at 11:02

## 2017-03-23 RX ADMIN — MAGNESIUM HYDROXIDE 10 ML: 2400 SUSPENSION ORAL at 22:37

## 2017-03-23 RX ADMIN — ZOLPIDEM TARTRATE 5 MG: 5 TABLET, FILM COATED ORAL at 21:21

## 2017-03-23 RX ADMIN — IPRATROPIUM BROMIDE AND ALBUTEROL SULFATE 3 ML: .5; 3 SOLUTION RESPIRATORY (INHALATION) at 19:25

## 2017-03-23 RX ADMIN — IPRATROPIUM BROMIDE AND ALBUTEROL SULFATE 3 ML: .5; 3 SOLUTION RESPIRATORY (INHALATION) at 23:40

## 2017-03-23 RX ADMIN — IPRATROPIUM BROMIDE AND ALBUTEROL SULFATE 3 ML: .5; 3 SOLUTION RESPIRATORY (INHALATION) at 07:04

## 2017-03-23 RX ADMIN — OSELTAMIVIR PHOSPHATE 75 MG: 75 CAPSULE ORAL at 09:00

## 2017-03-23 RX ADMIN — METOPROLOL TARTRATE 12.5 MG: 25 TABLET ORAL at 09:00

## 2017-03-23 RX ADMIN — DOCUSATE SODIUM 100 MG: 100 CAPSULE, LIQUID FILLED ORAL at 18:14

## 2017-03-23 RX ADMIN — PANTOPRAZOLE SODIUM 40 MG: 40 TABLET, DELAYED RELEASE ORAL at 05:51

## 2017-03-23 RX ADMIN — BISACODYL 10 MG: 10 SUPPOSITORY RECTAL at 22:40

## 2017-03-23 NOTE — PAYOR COMM NOTE
"Virgil Graham (66 y.o. Male)            ATTENTION; NURSE REVIEW, REPLY TO UR DEPT, CORNELIA BREANNA  [phone] OR CALL        Date of Birth Social Security Number Address Home Phone MRN    1950  [id number] [phone] [id number]    Nondenominational Marital Status          None        Admission Date Admission Type Admitting Provider Attending Provider Department, Room/Bed    3/22/17 Emergency Asher Long MD Ahmed, Aftab, MD [address], 411/1    Discharge Date Discharge Disposition Discharge Destination                      Attending Provider: Asher Long MD     Allergies:  Codeine    Isolation:  Droplet   Infection:  Influenza (03/22/17)   Code Status:  Not on file    Ht:  70\" (177.8 cm)   Wt:  225 lb (102 kg)    Admission Cmt:  None   Principal Problem:  None                Active Insurance as of 3/22/2017     Primary Coverage     Payor Plan Insurance Group Employer/Plan Group    [employer] [facility] [employer] [facility] PPO [id number]     Payor Plan Address Payor Plan Phone Number Effective From Effective To    [id number] [phone] 1/1/2012     [place] [address]       Subscriber Name Subscriber Birth Date Member ID       VIRGIL GRAHAM 1950 [id number]                 Emergency Contacts      (Rel.) Home Phone Work Phone Mobile Phone    Kymberly Stokes (Spouse) [phone] -- --            History & Physical     No notes of this type exist for this encounter.        Vital Signs (last 24 hours)       03/22 0700  -  03/23 0659 03/23 0700  -  03/23 1116   Most Recent    Temp (°F) 97.8 -  100.3       100.3 (37.9)    Heart Rate 59 -  103    92 -  100     92    Resp 14 -  24    18 -  20     18    /72 -  153/82       110/47    SpO2 (%) 92 -  97      93     93          Lines, Drains & Airways    Active LDAs     Name:   Placement date:   Placement time:   Site:   Days:    Peripheral IV Line - Single " Lumen 03/22/17 median cubital vein (antecubital fossa), left 18 gauge  03/22/17          1         Inactive LDAs     None                Hospital Medications (all)       Dose Frequency Start End    acetaminophen (TYLENOL) tablet 1,000 mg 1,000 mg Once 3/22/2017 3/22/2017    Sig - Route: Take 2 tablets by mouth 1 (One) Time. - Oral    acetaminophen (TYLENOL) tablet 650 mg 650 mg Every 6 Hours PRN 3/22/2017     Sig - Route: Take 2 tablets by mouth Every 6 (Six) Hours As Needed for Mild Pain (1-3). - Oral    aspirin chewable tablet 81 mg 81 mg Daily 3/22/2017     Sig - Route: Chew 1 tablet Daily. - Oral    ipratropium-albuterol (DUO-NEB) nebulizer solution 3 mL 3 mL Every 4 Hours - RT 3/22/2017     Sig - Route: Take 3 mL by nebulization Every 4 (Four) Hours. - Nebulization    metoprolol tartrate (LOPRESSOR) tablet 12.5 mg 12.5 mg Every 12 Hours Scheduled 3/22/2017     Sig - Route: Take 0.5 tablets by mouth Every 12 (Twelve) Hours. - Oral    morphine injection 2 mg 2 mg Every 4 Hours PRN 3/22/2017     Sig - Route: Infuse 1 mL into a venous catheter Every 4 (Four) Hours As Needed for Severe Pain (7-10). - Intravenous    morphine injection 4 mg 4 mg Once 3/22/2017 3/22/2017    Sig - Route: Infuse 1 mL into a venous catheter 1 (One) Time. - Intravenous    nitroglycerin (NITROSTAT) ointment 1 inch 1 inch Once 3/22/2017 3/22/2017    Sig - Route: Apply 1 inch topically 1 (One) Time. - Topical    nitroglycerin (NITROSTAT) SL tablet 0.4 mg 0.4 mg Every 5 Minutes PRN 3/22/2017     Sig - Route: Place 1 tablet under the tongue Every 5 (Five) Minutes As Needed for Chest Pain. - Sublingual    ondansetron (ZOFRAN) injection 4 mg 4 mg Once 3/22/2017 3/22/2017    Sig - Route: Infuse 2 mL into a venous catheter 1 (One) Time. - Intravenous    ondansetron (ZOFRAN) injection 4 mg 4 mg Every 6 Hours PRN 3/22/2017     Sig - Route: Infuse 2 mL into a venous catheter Every 6 (Six) Hours As Needed for Nausea or Vomiting. - Intravenous     oseltamivir (TAMIFLU) capsule 75 mg 75 mg Once 3/22/2017 3/22/2017    Sig - Route: Take 1 capsule by mouth 1 (One) Time. - Oral    oseltamivir (TAMIFLU) capsule 75 mg 75 mg Every 12 Hours Scheduled 3/22/2017 3/27/2017    Sig - Route: Take 1 capsule by mouth Every 12 (Twelve) Hours. - Oral    pantoprazole (PROTONIX) EC tablet 40 mg 40 mg Every Early Morning 3/23/2017     Sig - Route: Take 1 tablet by mouth Every Morning. - Oral    sodium chloride 0.9 % with KCl 20 mEq/L infusion 75 mL/hr Continuous 3/22/2017     Sig - Route: Infuse 75 mL/hr into a venous catheter Continuous. - Intravenous    zolpidem (AMBIEN) tablet 5 mg 5 mg Nightly PRN 3/22/2017 4/1/2017    Sig - Route: Take 1 tablet by mouth At Night As Needed for Sleep. - Oral    acetaminophen (TYLENOL) tablet 1,000 mg (Discontinued) 1,000 mg Once 3/22/2017 3/22/2017    Sig - Route: Take 2 tablets by mouth 1 (One) Time. - Oral    metoprolol tartrate (LOPRESSOR) tablet 25 mg (Discontinued) 25 mg Every 12 Hours Scheduled 3/22/2017 3/22/2017    Sig - Route: Take 1 tablet by mouth Every 12 (Twelve) Hours. - Oral    nitroglycerin (NITROSTAT) ointment 1 inch (Discontinued) 1 inch Every 8 Hours Scheduled 3/22/2017 3/23/2017    Sig - Route: Apply 1 inch topically Every 8 (Eight) Hours. - Topical             H&P    Date of Service: 3/22/2017 9:59 AM  Asher Long MD   Medicine        CHIEF COMPLAINT: Chest pain.      HISTORY: A 66-year-old white male with no significant past medical history except asthma, osteoarthritis, and low back pain, who presented to the emergency room with left-sided chest discomfort that started yesterday. He also has a nonproductive cough, some shortness of breath, fever and decongestion for the last 3 days. The patient was evaluated in the ER and found to have acute influenza A. Admitted for management and needs to rule out acute coronary syndrome for chest pain, which I doubt it is cardiac in origin. No fever or chills except for 1 day, but  this cough, congestion, and generalized weakness is getting worse.      PAST MEDICAL HISTORY:  1. Osteoarthritis.   2. Hip bursitis.   3. Low back pain.      SOCIAL HISTORY: Ex-smoker. No recent tobacco, alcohol, or drug abuse.      FAMILY HISTORY: Noncontributory.      ALLERGIES: CODEINE      HOME MEDICATIONS:  1. Tussionex.  2. Advil.   3. Tylenol.   4. Inhaler.   5. Recently started on antibiotics      PHYSICAL EXAMINATION:  GENERAL: Alert, oriented, in no respiratory distress.   VITAL SIGNS: Maximum temperature 99.0, 100.3 earlier, pulse 64, respirations 14, and blood pressure 117/72.   HEENT: Unremarkable.   NECK: Supple.   LUNGS: Decreased breath sounds.   HEART: S1 and S2.  CHEST: The pain is reproducible in the left side, not the chest.   ABDOMEN: Soft, nontender. Bowel sounds positive.   EXTREMITIES: No edema.   NEUROLOGICAL: No focal deficit.      DIAGNOSTIC DATA: CK-MB and troponin are normal. Chemistries normal. Glucose 105, potassium 4.0. CBC normal. Magnesium 1.9. D-dimer less than 0.27. Respiratory panel is positive for influenza H1, N1. Chest x-ray: No active disease. EKG: Sinus rhythm with nonspecific ST-T wave changes.      ASSESSMENT:  1. Acute influenza A.   2. Chest pain, doubt cardiac in origin.   3. Osteoarthritis.   4. Low back pain.      PLAN:   1. Admit.   2. Start oxygen, nitroglycerin, and aspirin.   3. Start Tamiflu.   4. IV fluids.   5. Cardiology to see.   6. Check echo. May need a stress test once ruled out for MI.   7. Repeat troponin and EKG.   8. Symptomatic treatment for cough and congestion.   9. Follow closely.   10. Further recommendations according to hospital course.         Asher Long M.D.  AA:curtis  D: 03/22/2017 15:20:52  T: 03/22/2017 15:49:31  Job ID: [id number]  Document ID: [id number]  cc:                            Consult Notes (last 24 hours) (Notes from 3/22/2017 11:17 AM through 3/23/2017 11:17 AM)      Yany Verde MD at 3/23/2017  6:16 AM  Version 1 of 1      Consult Orders:    1. Inpatient Consult to Cardiology [[id number]] ordered by Asher Long MD at 17 1515                    Patient Name: Karan Graham  :1950  66 y.o.    Date of Admission: 3/22/2017  Date of Consultation:  17  Encounter Provider: Katerina Bethea MD  Place of Service: [facility] CARDIOLOGY  Referring Provider: Asher Long MD  Patient Care Team:  Jerald Gonzalez MD as PCP - General (Internal Medicine)      Chief complaint:  Chest discomfort    History of Present Illness:  Patient is a 66 year old male who was seen by Dr. Restrepo with [facility] in 2016 for pre-operative evaluation prior to spine surgery.  He reportedly had a normal cardiac catheterization in  with no cardiac issues since (cath report requested).  An echocardiogram was obtained- results below.  He was considered an acceptable risk for planned spinal procedure.  He underwent a bilateral L4-5 laminectomy and partial medial facetectomy on 16.       Dictation on: 2017  9:10 AM by: KATERINA BETHEA [[id number]]         Echo- 2016  Conclusions:    Mild concentric left ventricular hypertrophy is observed.    Global left ventricular wall motion and contractility are within normal limits.    There is normal left ventricular systolic function.    The estimated ejection fraction is 55-60%.     Abnormal left ventricular diastolic filling is observed, consistent with    impaired relaxation.     There is a trace of mitral regurgitation.    There is a trace tricuspid regurgitation.     There is a trace pulmonic regurgitation.     Past Medical History:   Diagnosis Date   • Asthma    • Pancreas (digestive gland) works poorly    • Skin cancer    • Sleep apnea        Past Surgical History:   Procedure Laterality Date   • BACK SURGERY     • CHOLECYSTECTOMY     • EYELID LACERATION REPAIR     • HEMORRHOIDECTOMY     • PANCREAS SURGERY     • SHOULDER  SURGERY     • TUMOR REMOVAL      Roof Of Mouth         Prior to Admission medications    Medication Sig Start Date End Date Taking? Authorizing Provider   acetaminophen (TYLENOL) 650 MG 8 hr tablet Take  by mouth.   Yes Historical Provider, MD   albuterol (PROVENTIL) (2.5 MG/3ML) 0.083% nebulizer solution Take 2.5 mg by nebulization Every 4 (Four) Hours As Needed for Wheezing.   Yes Historical Provider, MD   benzonatate (TESSALON) 100 MG capsule Take 200 mg by mouth 3 (Three) Times a Day As Needed for Cough.   Yes Historical Provider, MD   predniSONE (DELTASONE) 20 MG tablet Take 20 mg by mouth 2 (Two) Times a Day. 3/18/17  Yes Historical Provider, MD   zolpidem (AMBIEN) 5 MG tablet  6/11/16  Yes Historical Provider, MD       Allergies   Allergen Reactions   • Codeine        Social History     Social History   • Marital status:      Spouse name: N/A   • Number of children: N/A   • Years of education: N/A     Social History Main Topics   • Smoking status: Former Smoker     Types: Pipe     Quit date: 1980   • Smokeless tobacco: None   • Alcohol use Yes      Comment: OCCASIONAL   • Drug use: No   • Sexual activity: Defer     Other Topics Concern   • None     Social History Narrative   • None       Family History   Problem Relation Age of Onset   • No Known Problems Mother    • No Known Problems Father    • No Known Problems Sister    • No Known Problems Brother    • No Known Problems Maternal Aunt    • No Known Problems Maternal Uncle    • No Known Problems Paternal Aunt    • No Known Problems Paternal Uncle    • No Known Problems Maternal Grandmother    • No Known Problems Maternal Grandfather    • No Known Problems Paternal Grandmother    • No Known Problems Paternal Grandfather    • Cancer Other    • Anesthesia problems Neg Hx    • Broken bones Neg Hx    • Clotting disorder Neg Hx    • Collagen disease Neg Hx    • Diabetes Neg Hx    • Dislocations Neg Hx    • Osteoporosis Neg Hx    • Rheumatologic disease Neg  Hx    • Scoliosis Neg Hx    • Severe sprains Neg Hx        REVIEW OF SYSTEMS:   All systems reviewed.  Pertinent positives identified in HPI.  All other systems are negative.      Objective:     Vitals:    03/22/17 2300 03/23/17 0319 03/23/17 0704 03/23/17 0800   BP: 110/47      BP Location: Right arm   Right arm   Patient Position: Lying   Lying   Pulse: 59 92 100    Resp: 16 16 18 20   Temp: 100.3 °F (37.9 °C)      TempSrc: Oral      SpO2: 93% 94% 93%    Weight:       Height:         Body mass index is 32.28 kg/(m^2).    General Appearance:    Alert, cooperative, in no acute distress   Head:    Normocephalic, without obvious abnormality, atraumatic   Eyes:            Lids and lashes normal, conjunctivae and sclerae normal, no   icterus, no pallor, corneas clear,    Ears:    Ears appear intact with no abnormalities noted   Throat:   No oral lesions, no thrush, oral mucosa moist   Neck:   No adenopathy, supple, trachea midline, no thyromegaly, no   carotid bruit, no JVD   Back:     No kyphosis present, no scoliosis present, no skin lesions, erythema or scars, no tenderness    Lungs:     Clear to auscultation,respirations regular, even and unlabored    Heart:    Regular rhythm and normal rate, normal S1 and S2, no murmur, no gallop, no rub, no click   Chest Wall:    No abnormalities observed   Abdomen:     Normal bowel sounds, no masses, no organomegaly, soft        non-tender, non-distended, no guarding, no rebound  tenderness   Extremities:   Moves all extremities well, no edema, no cyanosis, no redness   Pulses:   Pulses palpable and equal bilaterally. Normal radial, carotid, dorsalis pedis and posterior tibial pulses bilaterally.    Skin:  Psychiatric:   No bleeding, bruising or rash    Alert and oriented x 3, normal mood and affect   Lab Review:       Results from last 7 days  Lab Units 03/23/17  0409   SODIUM mmol/L 135*   POTASSIUM mmol/L 3.9   CHLORIDE mmol/L 100   TOTAL CO2 mmol/L 22.0   BUN mg/dL 15    CREATININE mg/dL 0.90   CALCIUM mg/dL 8.3*   BILIRUBIN mg/dL 0.4   ALK PHOS U/L 58   ALT (SGPT) U/L 18   AST (SGOT) U/L 18   GLUCOSE mg/dL 109*       Results from last 7 days  Lab Units 03/23/17  0409 03/22/17  1035   CK TOTAL U/L  --  53   TROPONIN T ng/mL <0.010 <0.010       Results from last 7 days  Lab Units 03/23/17  0409   WBC 10*3/mm3 4.74   HEMOGLOBIN g/dL 12.7*   HEMATOCRIT % 38.3*   PLATELETS 10*3/mm3 113*       Results from last 7 days  Lab Units 03/22/17  1035   INR  1.04       Results from last 7 days  Lab Units 03/22/17  1035   MAGNESIUM mg/dL 1.9       Results from last 7 days  Lab Units 03/23/17  0409   CHOLESTEROL mg/dL 111   TRIGLYCERIDES mg/dL 96   HDL CHOL mg/dL 19*               I personally viewed and interpreted the patient's EKG/Telemetry data.        Assessment and Plan:       1. Flu H1N1  2. Chest pain, noncardiac, worse with coughing and deep breathing.     Will see as needed, no further testing. F/u with CV[facility].     Yany Verde MD  03/23/17  9:00 AM               Electronically signed by Yany Verde MD at 3/23/2017  9:10 AM

## 2017-03-23 NOTE — PROGRESS NOTES
Discharge Planning Assessment  [facility]     Patient Name: Karan Graham  MRN: [id number]  Today's Date: 3/23/2017    Admit Date: 3/22/2017          Discharge Needs Assessment       03/23/17 1159    Living Environment    Lives With spouse    Living Arrangements house    Provides Primary Care For no one    Quality Of Family Relationships supportive    Able to Return to Prior Living Arrangements yes    Discharge Needs Assessment    Concerns To Be Addressed no discharge needs identified;denies needs/concerns at this time    Readmission Within The Last 30 Days no previous admission in last 30 days    Anticipated Changes Related to Illness none    Equipment Currently Used at Home cane, straight;other (see comments)   In process of getting CPAP    Equipment Needed After Discharge none    Transportation Available car;family or friend will provide            Discharge Plan       03/23/17 1159    Case Management/Social Work Plan    Plan Home with wife    Patient/Family In Agreement With Plan yes    Additional Comments Introduced self and explained role of CCP and facesheet verified with patient and wife with patient's permission.  Wife states patient lives in a 2 story house with wife and only uses a straight cane as needed and are in the process of getting a CPAP and denies need for home health or equipment at discharge.  Wife states patient uses [facility] pharmacy in [place], [place] and denies any issues affording or obtaining medications.  WIfe states the plan is for patient to return home with her and denies any needs.  CCP will continue to follow and assist as needed......Darya Garcia RN,CCP         Discharge Placement     No information found                Demographic Summary       03/23/17 1158    Referral Information    Admission Type inpatient    Arrived From home or self-care    Contact Information    Permission Granted to Share Information With family/designee   Kymberly Graham(wife) [phone]    Primary Care  Physician Information    Name Jerald Gonzalez MD            Functional Status       03/23/17 1158    Functional Status Current    Ambulation 0-->independent    Transferring 0-->independent    Toileting 0-->independent    Bathing 0-->independent    Dressing 0-->independent    Eating 0-->independent    Communication 0-->understands/communicates without difficulty    Change in Functional Status Since Onset of Current Illness/Injury no    Functional Status Prior    Ambulation 0-->independent    Transferring 0-->independent    Toileting 0-->independent    Bathing 0-->independent    Dressing 0-->independent    Eating 0-->independent    Communication 0-->understands/communicates without difficulty    IADL    Medications independent    Meal Preparation independent    Housekeeping independent    Laundry independent    Shopping independent    Oral Care independent    Activity Tolerance    Current Activity Limitations none    Usual Activity Tolerance good    Current Activity Tolerance moderate    Cognitive/Perceptual/Developmental    Current Mental Status/Cognitive Functioning no deficits noted    Recent Changes in Mental Status/Cognitive Functioning no changes    Developmental Stage (Eriksson's Stages of Development) Stage 7 (35-65 years/Middle Adulthood) Generativity vs. Stagnation            Psychosocial     None            Abuse/Neglect     None            Legal     None            Substance Abuse     None            Patient Forms     None          Darya Garcia, ARIANE

## 2017-03-23 NOTE — PROGRESS NOTES
" DAILY PROGRESS NOTE      CHIEF COMPLAINT:   DOING BETTER  NO CP    HISTORY: A 66-year-old white male with no significant past medical history except asthma, osteoarthritis, and low back pain, who presented to the emergency room with left-sided chest discomfort that started yesterday. He also has a nonproductive cough, some shortness of breath, fever and decongestion for the last 3 days. The patient was evaluated in the ER and found to have acute influenza A.  Admitted for management and needs to rule out acute coronary syndrome for chest pain, which I doubt it is cardiac in origin. No fever or chills except for 1 day, but this cough, congestion, and generalized weakness is getting worse.       PHYSICAL EXAMINATION:Blood pressure 110/47, pulse 92, temperature 100.3 °F (37.9 °C), temperature source Oral, resp. rate 18, height 70\" (177.8 cm), weight 225 lb (102 kg), SpO2 93 %.    GENERAL: Alert, oriented, in no respiratory distress.   HEENT: Unremarkable.   NECK: Supple.   LUNGS: Decreased breath sounds.   HEART: S1 and S2.  CHEST:  The pain is reproducible in the left side, not the chest.   ABDOMEN: Soft, nontender. Bowel sounds positive.   EXTREMITIES: No edema.   NEUROLOGICAL: No focal deficit.     DIAGNOSTIC DATA:   Lab Results (last 24 hours)     Procedure Component Value Units Date/Time    CBC & Differential [[id number]] Collected:  03/23/17 0409    Specimen:  Blood Updated:  03/23/17 0423    Narrative:       The following orders were created for panel order CBC & Differential.  Procedure                               Abnormality         Status                     ---------                               -----------         ------                     CBC Auto Differential[[id number]]         Abnormal            Final result                 Please view results for these tests on the individual orders.    CBC Auto Differential [[id number]]  (Abnormal) Collected:  03/23/17 0409    Specimen:  Blood Updated:  03/23/17 0423 "     WBC 4.74 10*3/mm3      RBC 4.15 (L) 10*6/mm3      Hemoglobin 12.7 (L) g/dL      Hematocrit 38.3 (L) %      MCV 92.3 fL      MCH 30.6 pg      MCHC 33.2 g/dL      RDW 12.9 %      RDW-SD 43.0 fl      MPV 10.8 fL      Platelets 113 (L) 10*3/mm3      Neutrophil % 56.8 %      Lymphocyte % 25.7 %      Monocyte % 17.3 (H) %      Eosinophil % 0.0 (L) %      Basophil % 0.2 %      Immature Grans % 0.0 %      Neutrophils, Absolute 2.69 10*3/mm3      Lymphocytes, Absolute 1.22 10*3/mm3      Monocytes, Absolute 0.82 10*3/mm3      Eosinophils, Absolute 0.00 10*3/mm3      Basophils, Absolute 0.01 10*3/mm3      Immature Grans, Absolute 0.00 10*3/mm3     Hemoglobin A1c [[id number]]  (Normal) Collected:  03/23/17 0409    Specimen:  Blood Updated:  03/23/17 0438     Hemoglobin A1C 5.20 %     Narrative:       Hemoglobin A1C Ranges:    Increased Risk for Diabetes  5.7% to 6.4%  Diabetes                     >= 6.5%  Diabetic Goal                < 7.0%    Comprehensive Metabolic Panel [[id number]]  (Abnormal) Collected:  03/23/17 0409    Specimen:  Blood Updated:  03/23/17 0452     Glucose 109 (H) mg/dL      BUN 15 mg/dL      Creatinine 0.90 mg/dL      Sodium 135 (L) mmol/L      Potassium 3.9 mmol/L      Chloride 100 mmol/L      CO2 22.0 mmol/L      Calcium 8.3 (L) mg/dL      Total Protein 5.8 (L) g/dL      Albumin 3.60 g/dL      ALT (SGPT) 18 U/L      AST (SGOT) 18 U/L      Alkaline Phosphatase 58 U/L      Total Bilirubin 0.4 mg/dL      eGFR Non African Amer 85 mL/min/1.73      Globulin 2.2 gm/dL      A/G Ratio 1.6 g/dL      BUN/Creatinine Ratio 16.7     Anion Gap 13.0 mmol/L     Lipid Panel [[id number]]  (Abnormal) Collected:  03/23/17 0409    Specimen:  Blood Updated:  03/23/17 0452     Total Cholesterol 111 mg/dL      Triglycerides 96 mg/dL      HDL Cholesterol 19 (L) mg/dL      LDL Cholesterol  73 mg/dL      VLDL Cholesterol 19.2 mg/dL      LDL/HDL Ratio 3.83    Narrative:       Cholesterol Reference Ranges  (U.S. Department of Health  and Human Services ATP III Classifications)    Desirable          <200 mg/dL  Borderline High    200-239 mg/dL  High Risk          >240 mg/dL      Triglyceride Reference Ranges  (U.S. Department of Health and Human Services ATP III Classifications)    Normal           <150 mg/dL  Borderline High  150-199 mg/dL  High             200-499 mg/dL  Very High        >500 mg/dL    HDL Reference Ranges  (U.S. Department of Health and Human Services ATP III Classifcations)    Low     <40 mg/dl (major risk factor for CHD)  High    >60 mg/dl ('negative' risk factor for CHD)        LDL Reference Ranges  (U.S. Department of Health and Human Services ATP III Classifcations)    Optimal          <100 mg/dL  Near Optimal     100-129 mg/dL  Borderline High  130-159 mg/dL  High             160-189 mg/dL  Very High        >189 mg/dL    TSH [[id number]]  (Normal) Collected:  03/23/17 0409    Specimen:  Blood Updated:  03/23/17 0458     TSH 0.776 mIU/mL     BNP [[id number]]  (Normal) Collected:  03/23/17 0409    Specimen:  Blood Updated:  03/23/17 0458     proBNP 34.7 pg/mL     Narrative:       Among patients with dyspnea, NT-proBNP is highly sensitive for the detection of acute congestive heart failure. In addition NT-proBNP of <300 pg/ml effectively rules out acute congestive heart failure with 99% negative predictive value.    Troponin [[id number]]  (Normal) Collected:  03/23/17 0409    Specimen:  Blood Updated:  03/23/17 0458     Troponin T <0.010 ng/mL     Narrative:       Troponin T Reference Ranges:  Less than 0.03 ng/mL:    Negative for AMI  0.03 to 0.09 ng/mL:      Indeterminant for AMI  Greater than 0.09 ng/mL: Positive for AMI    Blood Culture [[id number]]  (Normal) Collected:  03/22/17 1059    Specimen:  Blood from Arm, Left Updated:  03/23/17 1201     Blood Culture No growth at 24 hours    Blood Culture [[id number]]  (Normal) Collected:  03/22/17 1059    Specimen:  Blood from Arm, Left Updated:  03/23/17 1201     Blood Culture No  growth at 24 hours      CK-MB and troponin are normal. Chemistries normal.  Glucose 105, potassium 4.0. CBC normal. Magnesium 1.9. D-dimer less than 0.27. Respiratory panel is positive for influenza H1, N1. Chest x-ray: No active disease.  EKG: Sinus rhythm with nonspecific ST-T wave changes.   Imaging Results (last 72 hours)     Procedure Component Value Units Date/Time    XR Chest 1 View [[id number]] Collected:  03/22/17 1107     Updated:  03/22/17 1111    Narrative:       AP CHEST X-RAY     CLINICAL HISTORY: Chest pain. Asthma.     Compared to the previous chest x-ray dated 10/07/2010.     The lungs are somewhat poorly inflated. There is mild bibasilar  atelectasis. There are no acute focal infiltrates or pleural effusions.  The heart is normal in size.     IMPRESSIONS: No evidence of acute disease within the chest.     This report was finalized on 3/22/2017 11:08 AM by Dr. Thuan Das MD.           Current Facility-Administered Medications:   •  acetaminophen (TYLENOL) tablet 650 mg, 650 mg, Oral, Q6H PRN, Asher Long MD, 650 mg at 03/23/17 1309  •  aspirin chewable tablet 81 mg, 81 mg, Oral, Daily, Asher Long MD, 81 mg at 03/23/17 0900  •  ipratropium-albuterol (DUO-NEB) nebulizer solution 3 mL, 3 mL, Nebulization, Q4H - RT, Asher Long MD, 3 mL at 03/23/17 1102  •  metoprolol tartrate (LOPRESSOR) tablet 12.5 mg, 12.5 mg, Oral, Q12H, Asher Long MD, 12.5 mg at 03/23/17 0900  •  morphine injection 2 mg, 2 mg, Intravenous, Q4H PRN, Asher Long MD, 2 mg at 03/22/17 1548  •  nitroglycerin (NITROSTAT) SL tablet 0.4 mg, 0.4 mg, Sublingual, Q5 Min PRN, Tan Regalado MD, 0.4 mg at 03/22/17 1044  •  ondansetron (ZOFRAN) injection 4 mg, 4 mg, Intravenous, Q6H PRN, Asher Long MD  •  oseltamivir (TAMIFLU) capsule 75 mg, 75 mg, Oral, Q12H, Asher Long MD, 75 mg at 03/23/17 0900  •  pantoprazole (PROTONIX) EC tablet 40 mg, 40 mg, Oral, Q AM, Asher Long MD, 40 mg at 03/23/17 0551  •  sodium chloride 0.9 % with  KCl 20 mEq/L infusion, 75 mL/hr, Intravenous, Continuous, Asher Long MD, Last Rate: 75 mL/hr at 03/22/17 1828, 75 mL/hr at 03/22/17 1828  •  zolpidem (AMBIEN) tablet 5 mg, 5 mg, Oral, Nightly PRN, Ahser Long MD, 5 mg at 03/22/17 2057       ASSESSMENT:  1. Acute influenza A.   2. Chest pain, doubt cardiac in origin. ATYPIVAL  3. Osteoarthritis.   4. Low back pain.     PLAN:   1.CPM  2. TAMIFLU  3. IVF  4. SUPPORTIVE CARE  5. Cardiology NOTE NOTED  6. D/C IN AM    Asher Long M.D.

## 2017-03-23 NOTE — CONSULTS
Patient Name: Karan Graham  :1950  66 y.o.    Date of Admission: 3/22/2017  Date of Consultation:  17  Encounter Provider: Yany Verde MD  Place of Service: [facility] CARDIOLOGY  Referring Provider: Asher Long MD  Patient Care Team:  Jerald Gonzalez MD as PCP - General (Internal Medicine)      Chief complaint:  Chest discomfort    History of Present Illness:  Patient is a 66 year old male who was seen by Dr. Restrepo with [facility] in 2016 for pre-operative evaluation prior to spine surgery.  He reportedly had a normal cardiac catheterization in  with no cardiac issues since (cath report requested).  An echocardiogram was obtained- results below.  He was considered an acceptable risk for planned spinal procedure.  He underwent a bilateral L4-5 laminectomy and partial medial facetectomy on 16.       He presented to the emergency department with cough, short windedness and chest tightness which started on 2017.  He did not have a fever, but he stated he was very congested.  He stated the chest pain was worse with coughing and deep breathing.  When he arrived he was found to have H1N1 flu and Tamiflu was initiated.  He was given sublingual nitroglycerin for the pain and the nitro paste was applied.  His pain is gone now, but he says that he feels like he is finally working some phlegm out of his chest and he feels like that has helped more than anything.  He denies any heart racing or skipping.  He has had no dizziness or syncope.  He has had no nausea, vomiting or diarrhea.  He has had no falls at home.  Overall, right now, he says he feels pretty good.  He does have a slight headache that is probably from the nitro paste.  He has had no orthopnea or PND.  No history of myocardial infarction or heart failure.  No history of strokes and no heart rhythm disturbances in the past.  He is mildly tachycardiac right  now, but is mostly being driven by the flu.          Echo- 11/2016  Conclusions:    Mild concentric left ventricular hypertrophy is observed.    Global left ventricular wall motion and contractility are within normal limits.    There is normal left ventricular systolic function.    The estimated ejection fraction is 55-60%.     Abnormal left ventricular diastolic filling is observed, consistent with    impaired relaxation.     There is a trace of mitral regurgitation.    There is a trace tricuspid regurgitation.     There is a trace pulmonic regurgitation.     Past Medical History:   Diagnosis Date   • Asthma    • Pancreas (digestive gland) works poorly    • Skin cancer    • Sleep apnea        Past Surgical History:   Procedure Laterality Date   • BACK SURGERY     • CHOLECYSTECTOMY     • EYELID LACERATION REPAIR     • HEMORRHOIDECTOMY     • PANCREAS SURGERY     • SHOULDER SURGERY     • TUMOR REMOVAL      Roof Of Mouth         Prior to Admission medications    Medication Sig Start Date End Date Taking? Authorizing Provider   acetaminophen (TYLENOL) 650 MG 8 hr tablet Take  by mouth.   Yes Historical Provider, MD   albuterol (PROVENTIL) (2.5 MG/3ML) 0.083% nebulizer solution Take 2.5 mg by nebulization Every 4 (Four) Hours As Needed for Wheezing.   Yes Historical Provider, MD   benzonatate (TESSALON) 100 MG capsule Take 200 mg by mouth 3 (Three) Times a Day As Needed for Cough.   Yes Historical Provider, MD   predniSONE (DELTASONE) 20 MG tablet Take 20 mg by mouth 2 (Two) Times a Day. 3/18/17  Yes Historical Provider, MD   zolpidem (AMBIEN) 5 MG tablet  6/11/16  Yes Historical Provider, MD       Allergies   Allergen Reactions   • Codeine        Social History     Social History   • Marital status:      Spouse name: N/A   • Number of children: N/A   • Years of education: N/A     Social History Main Topics   • Smoking status: Former Smoker     Types: Pipe     Quit date: 1980   • Smokeless tobacco: None   •  Alcohol use Yes      Comment: OCCASIONAL   • Drug use: No   • Sexual activity: Defer     Other Topics Concern   • None     Social History Narrative   • None       Family History   Problem Relation Age of Onset   • No Known Problems Mother    • No Known Problems Father    • No Known Problems Sister    • No Known Problems Brother    • No Known Problems Maternal Aunt    • No Known Problems Maternal Uncle    • No Known Problems Paternal Aunt    • No Known Problems Paternal Uncle    • No Known Problems Maternal Grandmother    • No Known Problems Maternal Grandfather    • No Known Problems Paternal Grandmother    • No Known Problems Paternal Grandfather    • Cancer Other    • Anesthesia problems Neg Hx    • Broken bones Neg Hx    • Clotting disorder Neg Hx    • Collagen disease Neg Hx    • Diabetes Neg Hx    • Dislocations Neg Hx    • Osteoporosis Neg Hx    • Rheumatologic disease Neg Hx    • Scoliosis Neg Hx    • Severe sprains Neg Hx        REVIEW OF SYSTEMS:   All systems reviewed.  Pertinent positives identified in HPI.  All other systems are negative.      Objective:     Vitals:    03/22/17 2300 03/23/17 0319 03/23/17 0704 03/23/17 0800   BP: 110/47      BP Location: Right arm   Right arm   Patient Position: Lying   Lying   Pulse: 59 92 100    Resp: 16 16 18 20   Temp: 100.3 °F (37.9 °C)      TempSrc: Oral      SpO2: 93% 94% 93%    Weight:       Height:         Body mass index is 32.28 kg/(m^2).    General Appearance:    Alert, cooperative, in no acute distress   Head:    Normocephalic, without obvious abnormality, atraumatic   Eyes:            Lids and lashes normal, conjunctivae and sclerae normal, no   icterus, no pallor, corneas clear,    Ears:    Ears appear intact with no abnormalities noted   Throat:   No oral lesions, no thrush, oral mucosa moist   Neck:   No adenopathy, supple, trachea midline, no thyromegaly, no   carotid bruit, no JVD   Back:     No kyphosis present, no scoliosis present, no skin lesions,  erythema or scars, no tenderness    Lungs:     Clear to auscultation,respirations regular, even and unlabored    Heart:    Regular rhythm and normal rate, normal S1 and S2, no murmur, no gallop, no rub, no click   Chest Wall:    No abnormalities observed   Abdomen:     Normal bowel sounds, no masses, no organomegaly, soft        non-tender, non-distended, no guarding, no rebound  tenderness   Extremities:   Moves all extremities well, no edema, no cyanosis, no redness   Pulses:   Pulses palpable and equal bilaterally. Normal radial, carotid, dorsalis pedis and posterior tibial pulses bilaterally.    Skin:  Psychiatric:   No bleeding, bruising or rash    Alert and oriented x 3, normal mood and affect   Lab Review:       Results from last 7 days  Lab Units 03/23/17  0409   SODIUM mmol/L 135*   POTASSIUM mmol/L 3.9   CHLORIDE mmol/L 100   TOTAL CO2 mmol/L 22.0   BUN mg/dL 15   CREATININE mg/dL 0.90   CALCIUM mg/dL 8.3*   BILIRUBIN mg/dL 0.4   ALK PHOS U/L 58   ALT (SGPT) U/L 18   AST (SGOT) U/L 18   GLUCOSE mg/dL 109*       Results from last 7 days  Lab Units 03/23/17  0409 03/22/17  1035   CK TOTAL U/L  --  53   TROPONIN T ng/mL <0.010 <0.010       Results from last 7 days  Lab Units 03/23/17  0409   WBC 10*3/mm3 4.74   HEMOGLOBIN g/dL 12.7*   HEMATOCRIT % 38.3*   PLATELETS 10*3/mm3 113*       Results from last 7 days  Lab Units 03/22/17  1035   INR  1.04       Results from last 7 days  Lab Units 03/22/17  1035   MAGNESIUM mg/dL 1.9       Results from last 7 days  Lab Units 03/23/17  0409   CHOLESTEROL mg/dL 111   TRIGLYCERIDES mg/dL 96   HDL CHOL mg/dL 19*               I personally viewed and interpreted the patient's EKG/Telemetry data.        Assessment and Plan:       1. Flu H1N1  2. Chest pain, noncardiac, worse with coughing and deep breathing.     Will see as needed, no further testing. F/u with [facility].     Yany Verde MD  03/23/17  9:00 AM

## 2017-03-24 ENCOUNTER — APPOINTMENT (OUTPATIENT)
Dept: CARDIOLOGY | Facility: HOSPITAL | Age: 67
End: 2017-03-24
Attending: HOSPITALIST

## 2017-03-24 VITALS
RESPIRATION RATE: 20 BRPM | SYSTOLIC BLOOD PRESSURE: 124 MMHG | HEART RATE: 86 BPM | BODY MASS INDEX: 32.21 KG/M2 | HEIGHT: 70 IN | TEMPERATURE: 97.7 F | DIASTOLIC BLOOD PRESSURE: 88 MMHG | WEIGHT: 225 LBS | OXYGEN SATURATION: 92 %

## 2017-03-24 LAB
BH CV ECHO MEAS - ACS: 1.5 CM
BH CV ECHO MEAS - AO MEAN PG (FULL): 2 MMHG
BH CV ECHO MEAS - AO MEAN PG: 4 MMHG
BH CV ECHO MEAS - AO ROOT AREA (BSA CORRECTED): 1.5
BH CV ECHO MEAS - AO ROOT AREA: 8.6 CM^2
BH CV ECHO MEAS - AO ROOT DIAM: 3.3 CM
BH CV ECHO MEAS - AO V2 MAX: 130 CM/SEC
BH CV ECHO MEAS - AO V2 MEAN: 89.5 CM/SEC
BH CV ECHO MEAS - AO V2 VTI: 24.7 CM
BH CV ECHO MEAS - AVA(I,A): 2.4 CM^2
BH CV ECHO MEAS - AVA(I,D): 2.4 CM^2
BH CV ECHO MEAS - BSA(HAYCOCK): 2.3 M^2
BH CV ECHO MEAS - BSA: 2.2 M^2
BH CV ECHO MEAS - BZI_BMI: 32.3 KILOGRAMS/M^2
BH CV ECHO MEAS - BZI_METRIC_HEIGHT: 177.8 CM
BH CV ECHO MEAS - BZI_METRIC_WEIGHT: 102.1 KG
BH CV ECHO MEAS - CONTRAST EF (2CH): 54.7 ML/M^2
BH CV ECHO MEAS - CONTRAST EF 4CH: 54.9 ML/M^2
BH CV ECHO MEAS - EDV(CUBED): 39.3 ML
BH CV ECHO MEAS - EDV(MOD-SP2): 95 ML
BH CV ECHO MEAS - EDV(MOD-SP4): 82 ML
BH CV ECHO MEAS - EDV(TEICH): 47.4 ML
BH CV ECHO MEAS - EF(CUBED): 69 %
BH CV ECHO MEAS - EF(MOD-SP2): 54.7 %
BH CV ECHO MEAS - EF(MOD-SP4): 54.9 %
BH CV ECHO MEAS - EF(TEICH): 61.8 %
BH CV ECHO MEAS - ESV(CUBED): 12.2 ML
BH CV ECHO MEAS - ESV(MOD-SP2): 43 ML
BH CV ECHO MEAS - ESV(MOD-SP4): 37 ML
BH CV ECHO MEAS - ESV(TEICH): 18.1 ML
BH CV ECHO MEAS - FS: 32.4 %
BH CV ECHO MEAS - IVS/LVPW: 1.1
BH CV ECHO MEAS - IVSD: 1.1 CM
BH CV ECHO MEAS - LAT PEAK E' VEL: 8 CM/SEC
BH CV ECHO MEAS - LV DIASTOLIC VOL/BSA (35-75): 37.4 ML/M^2
BH CV ECHO MEAS - LV MASS(C)D: 106.3 GRAMS
BH CV ECHO MEAS - LV MASS(C)DI: 48.5 GRAMS/M^2
BH CV ECHO MEAS - LV MEAN PG: 2 MMHG
BH CV ECHO MEAS - LV SYSTOLIC VOL/BSA (12-30): 16.9 ML/M^2
BH CV ECHO MEAS - LV V1 MAX: 119 CM/SEC
BH CV ECHO MEAS - LV V1 MEAN: 65.8 CM/SEC
BH CV ECHO MEAS - LV V1 VTI: 19 CM
BH CV ECHO MEAS - LVIDD: 3.4 CM
BH CV ECHO MEAS - LVIDS: 2.3 CM
BH CV ECHO MEAS - LVLD AP2: 8.2 CM
BH CV ECHO MEAS - LVLD AP4: 8.2 CM
BH CV ECHO MEAS - LVLS AP2: 7 CM
BH CV ECHO MEAS - LVLS AP4: 7.5 CM
BH CV ECHO MEAS - LVOT AREA (M): 3.1 CM^2
BH CV ECHO MEAS - LVOT AREA: 3.1 CM^2
BH CV ECHO MEAS - LVOT DIAM: 2 CM
BH CV ECHO MEAS - LVPWD: 1 CM
BH CV ECHO MEAS - MED PEAK E' VEL: 7 CM/SEC
BH CV ECHO MEAS - MR MAX PG: 105.3 MMHG
BH CV ECHO MEAS - MR MAX VEL: 513 CM/SEC
BH CV ECHO MEAS - MV A DUR: 0.1 SEC
BH CV ECHO MEAS - MV A MAX VEL: 67.6 CM/SEC
BH CV ECHO MEAS - MV DEC SLOPE: 416 CM/SEC^2
BH CV ECHO MEAS - MV DEC TIME: 0.22 SEC
BH CV ECHO MEAS - MV E MAX VEL: 78.5 CM/SEC
BH CV ECHO MEAS - MV E/A: 1.2
BH CV ECHO MEAS - MV MEAN PG: 1 MMHG
BH CV ECHO MEAS - MV P1/2T MAX VEL: 70.6 CM/SEC
BH CV ECHO MEAS - MV P1/2T: 49.7 MSEC
BH CV ECHO MEAS - MV V2 MEAN: 52.2 CM/SEC
BH CV ECHO MEAS - MV V2 VTI: 17.2 CM
BH CV ECHO MEAS - MVA P1/2T LCG: 3.1 CM^2
BH CV ECHO MEAS - MVA(P1/2T): 4.4 CM^2
BH CV ECHO MEAS - MVA(VTI): 3.5 CM^2
BH CV ECHO MEAS - PA ACC SLOPE: 10.3 CM/SEC^2
BH CV ECHO MEAS - PA ACC TIME: 0.09 SEC
BH CV ECHO MEAS - PA MAX PG (FULL): 0.14 MMHG
BH CV ECHO MEAS - PA MAX PG: 3.1 MMHG
BH CV ECHO MEAS - PA PR(ACCEL): 40.8 MMHG
BH CV ECHO MEAS - PA V2 MAX: 87.7 CM/SEC
BH CV ECHO MEAS - PULM A REVS DUR: 0.1 SEC
BH CV ECHO MEAS - PULM A REVS VEL: 26.4 CM/SEC
BH CV ECHO MEAS - PULM DIAS VEL: 39 CM/SEC
BH CV ECHO MEAS - PULM S/D: 1.4
BH CV ECHO MEAS - PULM SYS VEL: 53.7 CM/SEC
BH CV ECHO MEAS - PVA(V,A): 3.7 CM^2
BH CV ECHO MEAS - PVA(V,D): 3.7 CM^2
BH CV ECHO MEAS - QP/QS: 0.96
BH CV ECHO MEAS - RAP SYSTOLE: 8 MMHG
BH CV ECHO MEAS - RV MAX PG: 2.9 MMHG
BH CV ECHO MEAS - RV MEAN PG: 1 MMHG
BH CV ECHO MEAS - RV V1 MAX: 85.7 CM/SEC
BH CV ECHO MEAS - RV V1 MEAN: 50.9 CM/SEC
BH CV ECHO MEAS - RV V1 VTI: 15.1 CM
BH CV ECHO MEAS - RVOT AREA: 3.8 CM^2
BH CV ECHO MEAS - RVOT DIAM: 2.2 CM
BH CV ECHO MEAS - RVSP: 17 MMHG
BH CV ECHO MEAS - SI(AO): 96.3 ML/M^2
BH CV ECHO MEAS - SI(CUBED): 12.4 ML/M^2
BH CV ECHO MEAS - SI(LVOT): 27.2 ML/M^2
BH CV ECHO MEAS - SI(MOD-SP2): 23.7 ML/M^2
BH CV ECHO MEAS - SI(MOD-SP4): 20.5 ML/M^2
BH CV ECHO MEAS - SI(TEICH): 13.4 ML/M^2
BH CV ECHO MEAS - SUP REN AO DIAM: 2.18 CM
BH CV ECHO MEAS - SV(AO): 211.3 ML
BH CV ECHO MEAS - SV(CUBED): 27.1 ML
BH CV ECHO MEAS - SV(LVOT): 59.7 ML
BH CV ECHO MEAS - SV(MOD-SP2): 52 ML
BH CV ECHO MEAS - SV(MOD-SP4): 45 ML
BH CV ECHO MEAS - SV(RVOT): 57.4 ML
BH CV ECHO MEAS - SV(TEICH): 29.3 ML
BH CV ECHO MEAS - TAPSE (>1.6): 2.2 CM2
BH CV ECHO MEAS - TR MAX VEL: 149 CM/SEC
BH CV XLRA - RV BASE: 2.6 CM
BH CV XLRA - TDI S': 15 CM/SEC
E/E' RATIO: 11
LEFT ATRIUM VOLUME INDEX: 15 ML/M2
LV EF 2D ECHO EST: 55 %

## 2017-03-24 PROCEDURE — 93306 TTE W/DOPPLER COMPLETE: CPT | Performed by: INTERNAL MEDICINE

## 2017-03-24 PROCEDURE — 94799 UNLISTED PULMONARY SVC/PX: CPT

## 2017-03-24 PROCEDURE — G0378 HOSPITAL OBSERVATION PER HR: HCPCS

## 2017-03-24 PROCEDURE — 25810000003 SODIUM CHLORIDE 0.9 % WITH KCL 20 MEQ 20-0.9 MEQ/L-% SOLUTION: Performed by: HOSPITALIST

## 2017-03-24 PROCEDURE — 96361 HYDRATE IV INFUSION ADD-ON: CPT

## 2017-03-24 PROCEDURE — 93306 TTE W/DOPPLER COMPLETE: CPT

## 2017-03-24 RX ORDER — PANTOPRAZOLE SODIUM 40 MG/1
40 TABLET, DELAYED RELEASE ORAL DAILY
Qty: 30 TABLET | Refills: 0 | Status: SHIPPED | OUTPATIENT
Start: 2017-03-24 | End: 2017-04-23

## 2017-03-24 RX ORDER — GUAIFENESIN 600 MG/1
600 TABLET, EXTENDED RELEASE ORAL 2 TIMES DAILY
Qty: 60 TABLET | Refills: 0 | Status: SHIPPED | OUTPATIENT
Start: 2017-03-24 | End: 2017-04-23

## 2017-03-24 RX ORDER — OSELTAMIVIR PHOSPHATE 75 MG/1
75 CAPSULE ORAL EVERY 12 HOURS SCHEDULED
Qty: 8 CAPSULE | Refills: 0 | Status: SHIPPED | OUTPATIENT
Start: 2017-03-24 | End: 2017-03-28

## 2017-03-24 RX ORDER — TAMSULOSIN HYDROCHLORIDE 0.4 MG/1
1 CAPSULE ORAL 2 TIMES DAILY
COMMUNITY

## 2017-03-24 RX ORDER — PSEUDOEPHEDRINE HCL 30 MG
100 TABLET ORAL 2 TIMES DAILY PRN
Qty: 30 CAPSULE | Refills: 0 | Status: SHIPPED | OUTPATIENT
Start: 2017-03-24 | End: 2017-04-23

## 2017-03-24 RX ADMIN — IPRATROPIUM BROMIDE AND ALBUTEROL SULFATE 3 ML: .5; 3 SOLUTION RESPIRATORY (INHALATION) at 06:40

## 2017-03-24 RX ADMIN — POTASSIUM CHLORIDE AND SODIUM CHLORIDE 75 ML/HR: 900; 150 INJECTION, SOLUTION INTRAVENOUS at 10:32

## 2017-03-24 RX ADMIN — PANTOPRAZOLE SODIUM 40 MG: 40 TABLET, DELAYED RELEASE ORAL at 05:31

## 2017-03-24 RX ADMIN — OSELTAMIVIR PHOSPHATE 75 MG: 75 CAPSULE ORAL at 10:18

## 2017-03-24 RX ADMIN — ACETAMINOPHEN 650 MG: 325 TABLET ORAL at 10:26

## 2017-03-24 RX ADMIN — ACETAMINOPHEN 650 MG: 325 TABLET ORAL at 03:36

## 2017-03-24 RX ADMIN — ASPIRIN 81 MG: 81 TABLET, CHEWABLE ORAL at 10:18

## 2017-03-24 RX ADMIN — IPRATROPIUM BROMIDE AND ALBUTEROL SULFATE 3 ML: .5; 3 SOLUTION RESPIRATORY (INHALATION) at 10:14

## 2017-03-24 RX ADMIN — METOPROLOL TARTRATE 12.5 MG: 25 TABLET ORAL at 10:18

## 2017-03-24 RX ADMIN — GUAIFENESIN 600 MG: 600 TABLET, EXTENDED RELEASE ORAL at 10:18

## 2017-03-24 NOTE — DISCHARGE SUMMARY
DATE OF ADMISSION:   03/22/2017  DATE OF DISCHARGE:   03/24/2017     FINAL DIAGNOSES:      1.  Acute influenza A, resolving.    2.  Atypical chest pain.   3.  Osteoarthritis.   4.  Low back pain.   5.  Hypertension.     DISCHARGE MEDICATIONS: Per discharge medication reconciliation.     CONSULTANTS:   Cardiology.     PROCEDURES: None.    DIAGNOSTIC DATA:    The patient did have an echo which showed ejection fraction of 55%. No valvular abnormality.     HISTORY OF PRESENT ILLNESS: A 66-year-old white male with no significant past medical history except for asthma, osteoarthritis, and low back pain. Admitted through the emergency room with chest discomfort, cough, and congestion. Workup in the ER revealed acute influenza A.  Admitted for management.     HOSPITAL COURSE: The patient was admitted. He received symptomatic treatment and started on Tamiflu. He started feeling better. He was ruled out for MI by enzymes and EKG. Cardiology recommended no further workup. No need for any stress test.  Echo was totally normal. He was afebrile. Vital signs stable. Blood pressure was 124/80. All of his labs are within normal limits. Potassium 3.9. He does have constipation. He also received stool softener.  LDL was 73.      DISCHARGE INSTRUCTIONS:  Discharge diet is regular.   Activity as tolerated.    Medications as above. The patient will follow up primary doctor in 1 week.  Take medications as directed.           JAKOB Godinez:stefania  D:   03/24/2017 11:08:57  T:   03/24/2017 11:50:42  Job ID:   [id number]  Document ID:   [id number]  cc:   [place] [id number]

## 2017-03-24 NOTE — PROGRESS NOTES
" DAILY PROGRESS NOTE      CHIEF COMPLAINT:   DOING BETTER  NO COMPLAINTS    HISTORY: A 66-year-old white male with no significant past medical history except asthma, osteoarthritis, and low back pain, who presented to the emergency room with left-sided chest discomfort that started yesterday. He also has a nonproductive cough, some shortness of breath, fever and decongestion for the last 3 days. The patient was evaluated in the ER and found to have acute influenza A.  Admitted for management and needs to rule out acute coronary syndrome for chest pain, which I doubt it is cardiac in origin. No fever or chills except for 1 day, but this cough, congestion, and generalized weakness is getting worse.       PHYSICAL EXAMINATION:Blood pressure 124/88, pulse 86, temperature 97.7 °F (36.5 °C), temperature source Oral, resp. rate 20, height 70\" (177.8 cm), weight 225 lb (102 kg), SpO2 92 %.    GENERAL: Alert, oriented, in no respiratory distress.   HEENT: Unremarkable.   NECK: Supple.   LUNGS: Decreased breath sounds.   HEART: S1 and S2.  CHEST:  The pain is reproducible in the left side, not the chest.   ABDOMEN: Soft, nontender. Bowel sounds positive.   EXTREMITIES: No edema.   NEUROLOGICAL: No focal deficit.     DIAGNOSTIC DATA:   Lab Results (last 24 hours)     Procedure Component Value Units Date/Time    Blood Culture [[id number]]  (Normal) Collected:  03/22/17 1059    Specimen:  Blood from Arm, Left Updated:  03/23/17 1201     Blood Culture No growth at 24 hours    Blood Culture [[id number]]  (Normal) Collected:  03/22/17 1059    Specimen:  Blood from Arm, Left Updated:  03/23/17 1201     Blood Culture No growth at 24 hours      CK-MB and troponin are normal. Chemistries normal.  Glucose 105, potassium 4.0. CBC normal. Magnesium 1.9. D-dimer less than 0.27. Respiratory panel is positive for influenza H1, N1. Chest x-ray: No active disease.  EKG: Sinus rhythm with nonspecific ST-T wave changes.   Imaging Results (last 72 " hours)     Procedure Component Value Units Date/Time    XR Chest 1 View [[id number]] Collected:  03/22/17 1107     Updated:  03/22/17 1111    Narrative:       AP CHEST X-RAY     CLINICAL HISTORY: Chest pain. Asthma.     Compared to the previous chest x-ray dated 10/07/2010.     The lungs are somewhat poorly inflated. There is mild bibasilar  atelectasis. There are no acute focal infiltrates or pleural effusions.  The heart is normal in size.     IMPRESSIONS: No evidence of acute disease within the chest.     This report was finalized on 3/22/2017 11:08 AM by Dr. Thuan Das MD.           Current Facility-Administered Medications:   •  acetaminophen (TYLENOL) tablet 650 mg, 650 mg, Oral, Q6H PRN, Asher Long MD, 650 mg at 03/24/17 1026  •  aspirin chewable tablet 81 mg, 81 mg, Oral, Daily, Asher Long MD, 81 mg at 03/24/17 1018  •  bisacodyl (DULCOLAX) suppository 10 mg, 10 mg, Rectal, Nightly PRN, Asher Long MD, 10 mg at 03/23/17 2240  •  docusate sodium (COLACE) capsule 100 mg, 100 mg, Oral, BID PRN, Asher Long MD, 100 mg at 03/23/17 1814  •  guaiFENesin (MUCINEX) 12 hr tablet 600 mg, 600 mg, Oral, BID, Asher Long MD, 600 mg at 03/24/17 1018  •  ipratropium-albuterol (DUO-NEB) nebulizer solution 3 mL, 3 mL, Nebulization, Q4H - RT, Asher Long MD, 3 mL at 03/24/17 1014  •  magnesium hydroxide (MILK OF MAGNESIA) suspension 2400 mg/10mL 10 mL, 10 mL, Oral, Nightly PRN, Asher Long MD, 10 mL at 03/23/17 2237  •  metoprolol tartrate (LOPRESSOR) tablet 12.5 mg, 12.5 mg, Oral, Q12H, Asher Long MD, 12.5 mg at 03/24/17 1018  •  ondansetron (ZOFRAN) injection 4 mg, 4 mg, Intravenous, Q6H PRN, Asher Long MD  •  oseltamivir (TAMIFLU) capsule 75 mg, 75 mg, Oral, Q12H, Asher Long MD, 75 mg at 03/24/17 1018  •  pantoprazole (PROTONIX) EC tablet 40 mg, 40 mg, Oral, Q AM, Asher Long MD, 40 mg at 03/24/17 0531  •  sodium chloride 0.9 % with KCl 20 mEq/L infusion, 75 mL/hr, Intravenous, Continuous, Asher Long,  MD, Last Rate: 75 mL/hr at 03/24/17 1032, 75 mL/hr at 03/24/17 1032  •  zolpidem (AMBIEN) tablet 5 mg, 5 mg, Oral, Nightly PRN, Asher Long MD, 5 mg at 03/23/17 2121       ASSESSMENT:  1. Acute influenza A.   2. Chest pain, doubt cardiac in origin. ATYPIVAL  3. Osteoarthritis.   4. Low back pain.     PLAN:   DISCHARGE  SUMMARY DICTATED      Asher Long M.D.

## 2017-03-24 NOTE — PROGRESS NOTES
Continued Stay Note  [facility]     Patient Name: Karan Graham  MRN: [id number]  Today's Date: 3/24/2017    Admit Date: 3/22/2017          Discharge Plan       03/24/17 1241    Case Management/Social Work Plan    Plan Home with wife    Final Note    Final Note Discharged  3/24/17 to home with wife              Discharge Codes       03/24/17 1241    Discharge Codes    Discharge Codes 01  Discharge to home        Expected Discharge Date and Time     Expected Discharge Date Expected Discharge Time    Mar 24, 2017             Darya Garcia RN

## 2017-03-27 LAB
BACTERIA SPEC AEROBE CULT: NORMAL
BACTERIA SPEC AEROBE CULT: NORMAL

## 2017-03-28 ENCOUNTER — OFFICE VISIT (OUTPATIENT)
Dept: ORTHOPEDIC SURGERY | Facility: CLINIC | Age: 67
End: 2017-03-28

## 2017-03-28 VITALS — HEIGHT: 70 IN | BODY MASS INDEX: 31.5 KG/M2 | WEIGHT: 220 LBS | TEMPERATURE: 97.1 F

## 2017-03-28 DIAGNOSIS — G89.29 CHRONIC PAIN OF LEFT KNEE: Primary | ICD-10-CM

## 2017-03-28 DIAGNOSIS — M17.12 PRIMARY OSTEOARTHRITIS OF LEFT KNEE: ICD-10-CM

## 2017-03-28 DIAGNOSIS — M25.562 CHRONIC PAIN OF LEFT KNEE: Primary | ICD-10-CM

## 2017-03-28 PROCEDURE — 99214 OFFICE O/P EST MOD 30 MIN: CPT | Performed by: ORTHOPAEDIC SURGERY

## 2017-03-28 PROCEDURE — 73562 X-RAY EXAM OF KNEE 3: CPT | Performed by: ORTHOPAEDIC SURGERY

## 2017-03-28 NOTE — PROGRESS NOTES
Patient: Karan Graham  YOB: 1950 66 y.o. male  Medical Record Number: [id number]    Chief Complaints:   Chief Complaint   Patient presents with   • Left Knee - Pain       History of Present Illness:Karan Graham is a 66 y.o. male who presents with  Complaints of left knee pain. He has had multiple injuries over the last year and a half. He nearly  falling off a bulldozer and hit his knee directly on the blade. He has had persistent severe pain but has improved somewhat over he past 3 weeks.    Allergies:   Allergies   Allergen Reactions   • Codeine        Medications:   Current Outpatient Prescriptions   Medication Sig Dispense Refill   • acetaminophen (TYLENOL) 650 MG 8 hr tablet Take  by mouth.     • albuterol (PROVENTIL) (2.5 MG/3ML) 0.083% nebulizer solution Take 2.5 mg by nebulization Every 4 (Four) Hours As Needed for Wheezing.     • benzonatate (TESSALON) 100 MG capsule Take 200 mg by mouth 3 (Three) Times a Day As Needed for Cough.     • docusate sodium 100 MG capsule Take 100 mg by mouth 2 (Two) Times a Day As Needed for Constipation for up to 30 days. 30 capsule 0   • guaiFENesin (MUCINEX) 600 MG 12 hr tablet Take 1 tablet by mouth 2 (Two) Times a Day for 30 days. 60 tablet 0   • metoprolol tartrate (LOPRESSOR) 25 MG tablet Take 0.5 tablets by mouth Every 12 (Twelve) Hours for 30 days. 30 tablet 0   • oseltamivir (TAMIFLU) 75 MG capsule Take 1 capsule by mouth Every 12 (Twelve) Hours for 4 days. 8 capsule 0   • pantoprazole (PROTONIX) 40 MG EC tablet Take 1 tablet by mouth Daily for 30 days. 30 tablet 0   • tamsulosin (FLOMAX) 0.4 MG capsule 24 hr capsule Take 1 capsule by mouth Daily.     • zolpidem (AMBIEN) 5 MG tablet        No current facility-administered medications for this visit.          The following portions of the patient's history were reviewed and updated as appropriate: allergies, current medications, past family history, past medical history, past social history, past  "surgical history and problem list.    Review of Systems:   A 14 point review of systems was performed. All systems negative except pertinent positives/negative listed in HPI above    Physical Exam:   Vitals:    03/28/17 1154   Temp: 97.1 °F (36.2 °C)   Weight: 220 lb (99.8 kg)   Height: 70\" (177.8 cm)       General: A and O x 3, ASA, NAD    SCLERA:    Normal    DENTITION:   Normal  Knee:  left    ALIGNMENT:     Varus  ,   Patella  tracks  midline    GAIT:    Antalgic    SKIN:    No abnormality    RANGE OF MOTION:   0  -  120   DEG    STRENGTH:   4  / 5    LIGAMENTS:    No varus / valgus instability.   Negative  Lachman.    MENISCUS:     Negative   Jenae       DISTAL PULSES:    Paplable    DISTAL SENSATION :   Intact    LYMPHATICS:     No   lymphadenopathy    OTHER:          - Positive  2+  effusion      - Crepitance with ROM          Radiology:  Xrays 3views left  (ap,lateral, sunrise) were ordered and reviewed for evaluation of knee pain demonstrating moderate joint space narrowing. There are no previous films for comparision.    MRI: IMPRESSION:  1. Oblique tear of the posterior horn and body of the medial meniscus.  2. Osteoarthritis is mild to moderate at the medial compartment and  advanced at the patellofemoral compartment. Small effusion. No fracture.      This report was finalized on 11/11/2016 3:23 PM by Dr. Tan Kim,    Assessment/Plan: Left knee OA with flare up due to multiple injuries  knee injected, PT  Large Joint Arthrocentesis  Date/Time: 3/29/2017 4:14 PM  Consent given by: patient  Site marked: site marked  Timeout: Immediately prior to procedure a time out was called to verify the correct patient, procedure, equipment, support staff and site/side marked as required   Supporting Documentation  Indications: pain and joint swelling   Procedure Details  Location: knee - L knee  Preparation: Patient was prepped and draped in the usual sterile fashion  Needle size: 18 G  Approach: " anterolateral  Medications administered: 4 mL lidocaine 1 %; 80 mg methylPREDNISolone acetate 80 MG/ML  Aspirate amount: 80 mL  Aspirate: clear and yellow                Lazaro Jeffers MD  3/28/2017

## 2017-03-29 PROCEDURE — 20610 DRAIN/INJ JOINT/BURSA W/O US: CPT | Performed by: ORTHOPAEDIC SURGERY

## 2017-03-29 RX ORDER — LIDOCAINE HYDROCHLORIDE 10 MG/ML
4 INJECTION, SOLUTION INFILTRATION; PERINEURAL
Status: COMPLETED | OUTPATIENT
Start: 2017-03-29 | End: 2017-03-29

## 2017-03-29 RX ORDER — METHYLPREDNISOLONE ACETATE 80 MG/ML
80 INJECTION, SUSPENSION INTRA-ARTICULAR; INTRALESIONAL; INTRAMUSCULAR; SOFT TISSUE
Status: COMPLETED | OUTPATIENT
Start: 2017-03-29 | End: 2017-03-29

## 2017-03-29 RX ADMIN — METHYLPREDNISOLONE ACETATE 80 MG: 80 INJECTION, SUSPENSION INTRA-ARTICULAR; INTRALESIONAL; INTRAMUSCULAR; SOFT TISSUE at 16:14

## 2017-03-29 RX ADMIN — LIDOCAINE HYDROCHLORIDE 4 ML: 10 INJECTION, SOLUTION INFILTRATION; PERINEURAL at 16:14

## 2017-05-09 ENCOUNTER — OFFICE VISIT (OUTPATIENT)
Dept: ORTHOPEDIC SURGERY | Facility: CLINIC | Age: 67
End: 2017-05-09

## 2017-05-09 VITALS — WEIGHT: 220.2 LBS | TEMPERATURE: 97.4 F | HEIGHT: 70 IN | BODY MASS INDEX: 31.52 KG/M2

## 2017-05-09 DIAGNOSIS — M17.12 PRIMARY OSTEOARTHRITIS OF LEFT KNEE: Primary | ICD-10-CM

## 2017-05-09 PROCEDURE — 99213 OFFICE O/P EST LOW 20 MIN: CPT | Performed by: ORTHOPAEDIC SURGERY

## 2018-03-12 ENCOUNTER — OFFICE (OUTPATIENT)
Dept: URBAN - METROPOLITAN AREA PATHOLOGY 4 | Facility: PATHOLOGY | Age: 68
End: 2018-03-12

## 2018-03-12 ENCOUNTER — AMBULATORY SURGICAL CENTER (OUTPATIENT)
Dept: URBAN - METROPOLITAN AREA SURGERY 20 | Facility: SURGERY | Age: 68
End: 2018-03-12

## 2018-03-12 DIAGNOSIS — K57.30 DIVERTICULOSIS OF LARGE INTESTINE WITHOUT PERFORATION OR ABS: ICD-10-CM

## 2018-03-12 DIAGNOSIS — Z80.0 FAMILY HISTORY OF MALIGNANT NEOPLASM OF DIGESTIVE ORGANS: ICD-10-CM

## 2018-03-12 DIAGNOSIS — K31.89 OTHER DISEASES OF STOMACH AND DUODENUM: ICD-10-CM

## 2018-03-12 DIAGNOSIS — R10.13 EPIGASTRIC PAIN: ICD-10-CM

## 2018-03-12 DIAGNOSIS — D12.3 BENIGN NEOPLASM OF TRANSVERSE COLON: ICD-10-CM

## 2018-03-12 DIAGNOSIS — Z12.11 ENCOUNTER FOR SCREENING FOR MALIGNANT NEOPLASM OF COLON: ICD-10-CM

## 2018-03-12 DIAGNOSIS — K64.8 OTHER HEMORRHOIDS: ICD-10-CM

## 2018-03-12 LAB
GI HISTOLOGY: A. SELECT: (no result)
GI HISTOLOGY: B. SELECT: (no result)
GI HISTOLOGY: PDF REPORT: (no result)

## 2018-03-12 PROCEDURE — 43239 EGD BIOPSY SINGLE/MULTIPLE: CPT | Mod: 59

## 2018-03-12 PROCEDURE — 88305 TISSUE EXAM BY PATHOLOGIST: CPT

## 2018-03-12 PROCEDURE — 45385 COLONOSCOPY W/LESION REMOVAL: CPT | Mod: PT

## 2018-03-12 RX ADMIN — PROPOFOL: 10 INJECTION, EMULSION INTRAVENOUS at 12:11

## 2018-03-26 ENCOUNTER — OFFICE (OUTPATIENT)
Dept: URBAN - METROPOLITAN AREA CLINIC 66 | Facility: CLINIC | Age: 68
End: 2018-03-26

## 2018-03-26 VITALS
HEIGHT: 70 IN | TEMPERATURE: 98.7 F | WEIGHT: 226 LBS | DIASTOLIC BLOOD PRESSURE: 75 MMHG | HEART RATE: 85 BPM | SYSTOLIC BLOOD PRESSURE: 137 MMHG

## 2018-03-26 DIAGNOSIS — K21.9 GASTRO-ESOPHAGEAL REFLUX DISEASE WITHOUT ESOPHAGITIS: ICD-10-CM

## 2018-03-26 DIAGNOSIS — K86.1 OTHER CHRONIC PANCREATITIS: ICD-10-CM

## 2018-03-26 DIAGNOSIS — Z80.0 FAMILY HISTORY OF MALIGNANT NEOPLASM OF DIGESTIVE ORGANS: ICD-10-CM

## 2018-03-26 PROCEDURE — 99214 OFFICE O/P EST MOD 30 MIN: CPT

## 2018-03-26 RX ORDER — PROMETHAZINE HYDROCHLORIDE 12.5 MG/1
50 TABLET ORAL
Qty: 60 | Refills: 1 | Status: ACTIVE
Start: 2018-03-26

## 2018-03-26 RX ORDER — OMEPRAZOLE 20 MG/1
20 CAPSULE, DELAYED RELEASE ORAL
Qty: 30 | Refills: 11 | Status: COMPLETED
Start: 2018-03-26 | End: 2024-04-22

## 2018-04-17 ENCOUNTER — ON CAMPUS - OUTPATIENT (OUTPATIENT)
Dept: URBAN - METROPOLITAN AREA HOSPITAL 134 | Facility: HOSPITAL | Age: 68
End: 2018-04-17

## 2018-04-17 DIAGNOSIS — R10.11 RIGHT UPPER QUADRANT PAIN: ICD-10-CM

## 2018-04-17 DIAGNOSIS — K85.90 ACUTE PANCREATITIS WITHOUT NECROSIS OR INFECTION, UNSPECIFIE: ICD-10-CM

## 2018-04-17 DIAGNOSIS — Z98.890 OTHER SPECIFIED POSTPROCEDURAL STATES: ICD-10-CM

## 2018-04-17 PROCEDURE — 43260 ERCP W/SPECIMEN COLLECTION: CPT

## 2018-05-03 ENCOUNTER — OFFICE (OUTPATIENT)
Dept: URBAN - METROPOLITAN AREA CLINIC 66 | Facility: CLINIC | Age: 68
End: 2018-05-03

## 2018-05-03 VITALS
HEART RATE: 74 BPM | WEIGHT: 229 LBS | DIASTOLIC BLOOD PRESSURE: 83 MMHG | SYSTOLIC BLOOD PRESSURE: 132 MMHG | HEIGHT: 70 IN

## 2018-05-03 DIAGNOSIS — K86.1 OTHER CHRONIC PANCREATITIS: ICD-10-CM

## 2018-05-03 DIAGNOSIS — R68.81 EARLY SATIETY: ICD-10-CM

## 2018-05-03 DIAGNOSIS — K21.9 GASTRO-ESOPHAGEAL REFLUX DISEASE WITHOUT ESOPHAGITIS: ICD-10-CM

## 2018-05-03 PROCEDURE — 99213 OFFICE O/P EST LOW 20 MIN: CPT

## 2018-05-03 RX ORDER — ERYTHROMYCIN 250 MG/1
TABLET, FILM COATED ORAL
Qty: 60 | Refills: 12 | Status: COMPLETED
Start: 2018-05-03 | End: 2018-08-22

## 2018-06-11 ENCOUNTER — OFFICE (OUTPATIENT)
Dept: URBAN - METROPOLITAN AREA CLINIC 66 | Facility: CLINIC | Age: 68
End: 2018-06-11

## 2018-06-11 VITALS
SYSTOLIC BLOOD PRESSURE: 128 MMHG | HEART RATE: 77 BPM | HEIGHT: 70 IN | DIASTOLIC BLOOD PRESSURE: 85 MMHG | WEIGHT: 227 LBS

## 2018-06-11 DIAGNOSIS — K86.1 OTHER CHRONIC PANCREATITIS: ICD-10-CM

## 2018-06-11 DIAGNOSIS — K21.9 GASTRO-ESOPHAGEAL REFLUX DISEASE WITHOUT ESOPHAGITIS: ICD-10-CM

## 2018-06-11 DIAGNOSIS — K31.84 GASTROPARESIS: ICD-10-CM

## 2018-06-11 PROCEDURE — 99213 OFFICE O/P EST LOW 20 MIN: CPT

## 2018-08-22 ENCOUNTER — OFFICE (OUTPATIENT)
Dept: URBAN - METROPOLITAN AREA CLINIC 66 | Facility: CLINIC | Age: 68
End: 2018-08-22

## 2018-08-22 VITALS
SYSTOLIC BLOOD PRESSURE: 131 MMHG | WEIGHT: 229 LBS | HEART RATE: 78 BPM | DIASTOLIC BLOOD PRESSURE: 73 MMHG | HEIGHT: 70 IN

## 2018-08-22 DIAGNOSIS — K86.1 OTHER CHRONIC PANCREATITIS: ICD-10-CM

## 2018-08-22 DIAGNOSIS — K31.84 GASTROPARESIS: ICD-10-CM

## 2018-08-22 PROCEDURE — 99214 OFFICE O/P EST MOD 30 MIN: CPT

## 2018-08-22 RX ORDER — METOCLOPRAMIDE 5 MG/1
TABLET ORAL
Qty: 120 | Refills: 3 | Status: ACTIVE
Start: 2018-05-11

## 2019-07-09 ENCOUNTER — OFFICE VISIT (OUTPATIENT)
Dept: ORTHOPEDIC SURGERY | Facility: CLINIC | Age: 69
End: 2019-07-09

## 2019-07-09 VITALS — BODY MASS INDEX: 31.5 KG/M2 | TEMPERATURE: 98.2 F | HEIGHT: 70 IN | WEIGHT: 220 LBS

## 2019-07-09 DIAGNOSIS — M25.561 RIGHT KNEE PAIN, UNSPECIFIED CHRONICITY: Primary | ICD-10-CM

## 2019-07-09 PROCEDURE — 73562 X-RAY EXAM OF KNEE 3: CPT | Performed by: NURSE PRACTITIONER

## 2019-07-09 PROCEDURE — 99213 OFFICE O/P EST LOW 20 MIN: CPT | Performed by: NURSE PRACTITIONER

## 2019-07-09 PROCEDURE — 20610 DRAIN/INJ JOINT/BURSA W/O US: CPT | Performed by: NURSE PRACTITIONER

## 2019-07-09 RX ORDER — METHYLPREDNISOLONE ACETATE 80 MG/ML
80 INJECTION, SUSPENSION INTRA-ARTICULAR; INTRALESIONAL; INTRAMUSCULAR; SOFT TISSUE
Status: COMPLETED | OUTPATIENT
Start: 2019-07-09 | End: 2019-07-09

## 2019-07-09 RX ORDER — OMEPRAZOLE 20 MG/1
20 CAPSULE, DELAYED RELEASE ORAL DAILY
COMMUNITY

## 2019-07-09 RX ORDER — LIDOCAINE HYDROCHLORIDE 10 MG/ML
2 INJECTION, SOLUTION EPIDURAL; INFILTRATION; INTRACAUDAL; PERINEURAL
Status: COMPLETED | OUTPATIENT
Start: 2019-07-09 | End: 2019-07-09

## 2019-07-09 RX ORDER — MELOXICAM 7.5 MG/1
7.5 TABLET ORAL DAILY
COMMUNITY
End: 2020-09-09 | Stop reason: HOSPADM

## 2019-07-09 RX ADMIN — LIDOCAINE HYDROCHLORIDE 2 ML: 10 INJECTION, SOLUTION EPIDURAL; INFILTRATION; INTRACAUDAL; PERINEURAL at 16:29

## 2019-07-09 RX ADMIN — METHYLPREDNISOLONE ACETATE 80 MG: 80 INJECTION, SUSPENSION INTRA-ARTICULAR; INTRALESIONAL; INTRAMUSCULAR; SOFT TISSUE at 16:29

## 2019-07-09 NOTE — PROGRESS NOTES
Patient: Karan Graham  YOB: 1950 69 y.o. male  Medical Record Number: [id number]    Chief Complaints:   Chief Complaint   Patient presents with   • Right Knee - Establish Care, Pain       History of Present Illness:Karan Graham is a 69 y.o. male who presents with new complaint of right knee pain.  Apparently the patient was working on some fencing and slipped in the mind, twisted his right knee and had acute onset of pain.  That occurred approximately 2 months ago.  Describes the knee pain as a moderate to severe constant ache, worse with standing sitting driving walking.  He has occasional clicking and swelling.  Pain is better with anti-inflammatories and rest    Allergies:   Allergies   Allergen Reactions   • Codeine    • Ibuprofen Nausea And Vomiting       Medications:   Current Outpatient Medications   Medication Sig Dispense Refill   • acetaminophen (TYLENOL) 650 MG 8 hr tablet Take  by mouth.     • albuterol (PROVENTIL) (2.5 MG/3ML) 0.083% nebulizer solution Take 2.5 mg by nebulization Every 4 (Four) Hours As Needed for Wheezing.     • meloxicam (MOBIC) 7.5 MG tablet Take 7.5 mg by mouth Daily.     • omeprazole (priLOSEC) 20 MG capsule Take 20 mg by mouth Daily.     • tamsulosin (FLOMAX) 0.4 MG capsule 24 hr capsule Take 1 capsule by mouth Daily.     • benzonatate (TESSALON) 100 MG capsule Take 200 mg by mouth 3 (Three) Times a Day As Needed for Cough.     • zolpidem (AMBIEN) 5 MG tablet        No current facility-administered medications for this visit.          The following portions of the patient's history were reviewed and updated as appropriate: allergies, current medications, past family history, past medical history, past social history, past surgical history and problem list.    Review of Systems:   A 14 point review of systems was performed. All systems negative except pertinent positives/negative listed in HPI above    Physical Exam:   Vitals:    07/09/19 1506   Temp: 98.2 °F (36.8  "°C)   TempSrc: Temporal   Weight: 99.8 kg (220 lb)   Height: 177.8 cm (70\")       General: A and O x 3, ASA, NAD    SCLERA:    Normal    DENTITION:   Normal  Skin clear no unusual lesions noted  Right knee patient does have 1+ effusion noted with 110 degrees flexion neutral and extension with a positive Jenae negative Lockman calf soft nontender    Radiology:  Xrays 3views (ap,lateral, sunrise) right knee were ordered and reviewed today secondary to pain and show significant osteoarthritis.  No compared to views available    Assessment/Plan:  Right knee osteoarthritis with recent injury    I discussed treatment options.  He would like to proceed with right knee aspiration injection.  Prior to procedure risks were discussed including pain, infection, elevated blood sugar.  Patient verbalized understanding would like to proceed.  He was referred to outpatient physical therapy and we will see him back as needed    Large Joint Arthrocentesis: R knee  Date/Time: 7/9/2019 4:29 PM  Consent given by: patient  Site marked: site marked  Timeout: Immediately prior to procedure a time out was called to verify the correct patient, procedure, equipment, support staff and site/side marked as required   Supporting Documentation  Indications: pain and joint swelling   Procedure Details  Location: knee - R knee  Preparation: Patient was prepped and draped in the usual sterile fashion  Needle size: 22 G  Approach: anterolateral  Medications administered: 2 mL lidocaine PF 1% 1 %; 80 mg methylPREDNISolone acetate 80 MG/ML  Aspirate amount: 30 mL  Aspirate: clear  Patient tolerance: patient tolerated the procedure well with no immediate complications        "

## 2019-08-05 ENCOUNTER — OFFICE (OUTPATIENT)
Dept: URBAN - METROPOLITAN AREA CLINIC 66 | Facility: CLINIC | Age: 69
End: 2019-08-05

## 2019-08-05 VITALS
HEART RATE: 71 BPM | HEIGHT: 70 IN | DIASTOLIC BLOOD PRESSURE: 77 MMHG | WEIGHT: 222 LBS | SYSTOLIC BLOOD PRESSURE: 120 MMHG

## 2019-08-05 DIAGNOSIS — K57.30 DIVERTICULOSIS OF LARGE INTESTINE WITHOUT PERFORATION OR ABS: ICD-10-CM

## 2019-08-05 DIAGNOSIS — K31.84 GASTROPARESIS: ICD-10-CM

## 2019-08-05 DIAGNOSIS — K92.1 MELENA: ICD-10-CM

## 2019-08-05 DIAGNOSIS — K21.9 GASTRO-ESOPHAGEAL REFLUX DISEASE WITHOUT ESOPHAGITIS: ICD-10-CM

## 2019-08-05 DIAGNOSIS — K86.1 OTHER CHRONIC PANCREATITIS: ICD-10-CM

## 2019-08-05 PROCEDURE — 99214 OFFICE O/P EST MOD 30 MIN: CPT

## 2019-08-05 RX ORDER — METRONIDAZOLE 500 MG/1
1500 TABLET, FILM COATED ORAL
Qty: 42 | Refills: 0 | Status: COMPLETED
Start: 2019-08-05 | End: 2019-11-11

## 2019-08-07 ENCOUNTER — AMBULATORY SURGICAL CENTER (OUTPATIENT)
Dept: URBAN - METROPOLITAN AREA SURGERY 20 | Facility: SURGERY | Age: 69
End: 2019-08-07

## 2019-08-07 VITALS — HEIGHT: 70 IN

## 2019-08-07 DIAGNOSIS — K21.9 GASTRO-ESOPHAGEAL REFLUX DISEASE WITHOUT ESOPHAGITIS: ICD-10-CM

## 2019-08-07 DIAGNOSIS — R68.81 EARLY SATIETY: ICD-10-CM

## 2019-08-07 DIAGNOSIS — R10.13 EPIGASTRIC PAIN: ICD-10-CM

## 2019-08-07 PROCEDURE — 43235 EGD DIAGNOSTIC BRUSH WASH: CPT

## 2019-08-07 NOTE — SERVICEHPINOTES
JOELLEN WEAVER  is a  69  male   who presents today for a  EGD   for   the indications listed below. The updated Patient Profile was reviewed prior to the procedure, in conjunction with the Physical Exam, including medical conditions, surgical procedures, medications, allergies, family history and social history. See Physical Exam time stamp below for date and time of HPI completion.Pre-operatively, I reviewed the indication(s) for the procedure, the risks of the procedure [including but not limited to: unexpected bleeding possibly requiring hospitalization and/or unplanned repeat procedures, perforation possibly requiring surgical treatment, missed lesions and complications of sedation/MAC (also explained by anesthesia staff)]. I have evaluated the patient for risks associated with the planned anesthesia and the procedure to be performed and find the patient an acceptable candidate for IV sedation.Multiple opportunities were provided for any questions or concerns, and all questions were answered satisfactorily before any anesthesia was administered. We will proceed with the planned procedure.chronic NSAID use.BR

## 2019-08-19 ENCOUNTER — OFFICE (OUTPATIENT)
Dept: URBAN - METROPOLITAN AREA CLINIC 66 | Facility: CLINIC | Age: 69
End: 2019-08-19

## 2019-08-19 VITALS
SYSTOLIC BLOOD PRESSURE: 118 MMHG | WEIGHT: 225 LBS | HEART RATE: 78 BPM | HEIGHT: 70 IN | DIASTOLIC BLOOD PRESSURE: 72 MMHG

## 2019-08-19 DIAGNOSIS — K86.1 OTHER CHRONIC PANCREATITIS: ICD-10-CM

## 2019-08-19 DIAGNOSIS — A04.9 BACTERIAL INTESTINAL INFECTION, UNSPECIFIED: ICD-10-CM

## 2019-08-19 DIAGNOSIS — K31.84 GASTROPARESIS: ICD-10-CM

## 2019-08-19 DIAGNOSIS — K21.9 GASTRO-ESOPHAGEAL REFLUX DISEASE WITHOUT ESOPHAGITIS: ICD-10-CM

## 2019-08-19 PROCEDURE — 99213 OFFICE O/P EST LOW 20 MIN: CPT

## 2019-11-11 ENCOUNTER — OFFICE (OUTPATIENT)
Dept: URBAN - METROPOLITAN AREA CLINIC 66 | Facility: CLINIC | Age: 69
End: 2019-11-11
Payer: MEDICARE

## 2019-11-11 VITALS
DIASTOLIC BLOOD PRESSURE: 76 MMHG | HEART RATE: 73 BPM | SYSTOLIC BLOOD PRESSURE: 133 MMHG | WEIGHT: 228 LBS | HEIGHT: 70 IN

## 2019-11-11 DIAGNOSIS — R14.3 FLATULENCE: ICD-10-CM

## 2019-11-11 DIAGNOSIS — R10.13 EPIGASTRIC PAIN: ICD-10-CM

## 2019-11-11 DIAGNOSIS — R10.11 RIGHT UPPER QUADRANT PAIN: ICD-10-CM

## 2019-11-11 DIAGNOSIS — R10.31 RIGHT LOWER QUADRANT PAIN: ICD-10-CM

## 2019-11-11 PROCEDURE — 20552 NJX 1/MLT TRIGGER POINT 1/2: CPT | Mod: RT

## 2019-11-11 PROCEDURE — 99215 OFFICE O/P EST HI 40 MIN: CPT

## 2019-12-09 ENCOUNTER — OFFICE VISIT (OUTPATIENT)
Dept: ORTHOPEDIC SURGERY | Facility: CLINIC | Age: 69
End: 2019-12-09

## 2019-12-09 ENCOUNTER — APPOINTMENT (OUTPATIENT)
Dept: LAB | Facility: HOSPITAL | Age: 69
End: 2019-12-09

## 2019-12-09 VITALS — BODY MASS INDEX: 32.21 KG/M2 | HEIGHT: 70 IN | TEMPERATURE: 98.4 F | WEIGHT: 225 LBS

## 2019-12-09 DIAGNOSIS — M25.511 RIGHT SHOULDER PAIN, UNSPECIFIED CHRONICITY: Primary | ICD-10-CM

## 2019-12-09 LAB
BASOPHILS # BLD AUTO: 0.04 10*3/MM3 (ref 0–0.2)
BASOPHILS NFR BLD AUTO: 0.6 % (ref 0–1.5)
CRP SERPL-MCNC: 0.12 MG/DL (ref 0–0.5)
DEPRECATED RDW RBC AUTO: 39.8 FL (ref 37–54)
EOSINOPHIL # BLD AUTO: 0.14 10*3/MM3 (ref 0–0.4)
EOSINOPHIL NFR BLD AUTO: 2.1 % (ref 0.3–6.2)
ERYTHROCYTE [DISTWIDTH] IN BLOOD BY AUTOMATED COUNT: 12.2 % (ref 12.3–15.4)
ERYTHROCYTE [SEDIMENTATION RATE] IN BLOOD: 6 MM/HR (ref 0–20)
HCT VFR BLD AUTO: 43 % (ref 37.5–51)
HGB BLD-MCNC: 15 G/DL (ref 13–17.7)
IMM GRANULOCYTES # BLD AUTO: 0.02 10*3/MM3 (ref 0–0.05)
IMM GRANULOCYTES NFR BLD AUTO: 0.3 % (ref 0–0.5)
LYMPHOCYTES # BLD AUTO: 2.03 10*3/MM3 (ref 0.7–3.1)
LYMPHOCYTES NFR BLD AUTO: 30.3 % (ref 19.6–45.3)
MCH RBC QN AUTO: 31.1 PG (ref 26.6–33)
MCHC RBC AUTO-ENTMCNC: 34.9 G/DL (ref 31.5–35.7)
MCV RBC AUTO: 89 FL (ref 79–97)
MONOCYTES # BLD AUTO: 0.84 10*3/MM3 (ref 0.1–0.9)
MONOCYTES NFR BLD AUTO: 12.5 % (ref 5–12)
NEUTROPHILS # BLD AUTO: 3.64 10*3/MM3 (ref 1.7–7)
NEUTROPHILS NFR BLD AUTO: 54.2 % (ref 42.7–76)
NRBC BLD AUTO-RTO: 0 /100 WBC (ref 0–0.2)
PLATELET # BLD AUTO: 174 10*3/MM3 (ref 140–450)
PMV BLD AUTO: 10.1 FL (ref 6–12)
RBC # BLD AUTO: 4.83 10*6/MM3 (ref 4.14–5.8)
WBC NRBC COR # BLD: 6.71 10*3/MM3 (ref 3.4–10.8)

## 2019-12-09 PROCEDURE — 99214 OFFICE O/P EST MOD 30 MIN: CPT | Performed by: ORTHOPAEDIC SURGERY

## 2019-12-09 PROCEDURE — 85025 COMPLETE CBC W/AUTO DIFF WBC: CPT | Performed by: ORTHOPAEDIC SURGERY

## 2019-12-09 PROCEDURE — 86618 LYME DISEASE ANTIBODY: CPT | Performed by: ORTHOPAEDIC SURGERY

## 2019-12-09 PROCEDURE — 73030 X-RAY EXAM OF SHOULDER: CPT | Performed by: ORTHOPAEDIC SURGERY

## 2019-12-09 PROCEDURE — 85652 RBC SED RATE AUTOMATED: CPT | Performed by: ORTHOPAEDIC SURGERY

## 2019-12-09 PROCEDURE — 36415 COLL VENOUS BLD VENIPUNCTURE: CPT | Performed by: ORTHOPAEDIC SURGERY

## 2019-12-09 PROCEDURE — 86140 C-REACTIVE PROTEIN: CPT | Performed by: ORTHOPAEDIC SURGERY

## 2019-12-09 RX ORDER — MELOXICAM 15 MG/1
TABLET ORAL
Refills: 3 | COMMUNITY
Start: 2019-09-12 | End: 2020-09-09 | Stop reason: HOSPADM

## 2019-12-10 NOTE — PROGRESS NOTES
"  Patient: Karan Graham    YOB: 1950    Medical Record Number: [id number]    Chief Complaints: Right shoulder pain, diffuse arthralgias    History of Present Illness:     69 y.o. male patient who presents for a couple of issues.  The first is his right shoulder.  He reports that his symptoms began roughly 3 or 4 months ago.  He does not recall any specific injury.  He reports severe intermittent aching pain.  The symptoms are worse at night and with certain reaching and lifting movements.  He does take meloxicam which helps somewhat.  He has not noticed any other alleviating factors.  Most of his pain is lateral.    The second issue is diffuse intermittent joint pains.  He thinks that it is just \"old age\" but he does mention to me that he was cutting hay last spring when he got a tick bite.  The tick bite got infected afterwards.  He reports intermittent joint aches and pains since the bite.  He denies any fevers, chills, sweats, constitutional symptoms or any other issues.    Allergies:   Allergies   Allergen Reactions   • Codeine    • Ibuprofen Nausea And Vomiting       Home Medications:    Current Outpatient Medications:   •  acetaminophen (TYLENOL) 650 MG 8 hr tablet, Take  by mouth., Disp: , Rfl:   •  meloxicam (MOBIC) 15 MG tablet, TK 1 T PO ONCE D PRN., Disp: , Rfl: 3  •  omeprazole (priLOSEC) 20 MG capsule, Take 20 mg by mouth Daily., Disp: , Rfl:   •  tamsulosin (FLOMAX) 0.4 MG capsule 24 hr capsule, Take 1 capsule by mouth Daily., Disp: , Rfl:   •  albuterol (PROVENTIL) (2.5 MG/3ML) 0.083% nebulizer solution, Take 2.5 mg by nebulization Every 4 (Four) Hours As Needed for Wheezing., Disp: , Rfl:   •  benzonatate (TESSALON) 100 MG capsule, Take 200 mg by mouth 3 (Three) Times a Day As Needed for Cough., Disp: , Rfl:   •  meloxicam (MOBIC) 7.5 MG tablet, Take 7.5 mg by mouth Daily., Disp: , Rfl:   •  zolpidem (AMBIEN) 5 MG tablet, , Disp: , Rfl:     Past Medical History:   Diagnosis Date   • " Asthma    • Pancreas (digestive gland) works poorly    • Skin cancer    • Sleep apnea        Past Surgical History:   Procedure Laterality Date   • BACK SURGERY     • CHOLECYSTECTOMY     • EYELID LACERATION REPAIR     • HEMORRHOIDECTOMY     • PANCREAS SURGERY     • SHOULDER SURGERY     • TUMOR REMOVAL      Roof Of Mouth       Social History     Occupational History   • Not on file   Tobacco Use   • Smoking status: Former Smoker     Types: Pipe     Last attempt to quit:      Years since quittin.9   • Smokeless tobacco: Never Used   Substance and Sexual Activity   • Alcohol use: Yes     Comment: OCCASIONAL   • Drug use: No   • Sexual activity: Defer      Social History     Social History Narrative   • Not on file       Family History   Problem Relation Age of Onset   • No Known Problems Mother    • No Known Problems Father    • No Known Problems Sister    • No Known Problems Brother    • No Known Problems Maternal Aunt    • No Known Problems Maternal Uncle    • No Known Problems Paternal Aunt    • No Known Problems Paternal Uncle    • No Known Problems Maternal Grandmother    • No Known Problems Maternal Grandfather    • No Known Problems Paternal Grandmother    • No Known Problems Paternal Grandfather    • Cancer Other    • Anesthesia problems Neg Hx    • Broken bones Neg Hx    • Clotting disorder Neg Hx    • Collagen disease Neg Hx    • Diabetes Neg Hx    • Dislocations Neg Hx    • Osteoporosis Neg Hx    • Rheumatologic disease Neg Hx    • Scoliosis Neg Hx    • Severe sprains Neg Hx        Review of Systems:      Constitutional: Denies fever, shaking or chills   Eyes: Denies change in visual acuity   HEENT: Denies nasal congestion or sore throat   Respiratory: Denies cough or shortness of breath   Cardiovascular: Denies chest pain or edema  Endocrine: Denies tremors, palpitations, intolerance of heat or cold, polyuria, polydipsia.  GI: Denies abdominal pain, nausea, vomiting, bloody stools or diarrhea  :  "Denies frequency, urgency, incontinence, retention, or nocturia.  Musculoskeletal: Denies numbness, tingling or loss of motor function except as above  Integument: Denies rash, lesion or ulceration   Neurologic: Denies headache or focal weakness, deficits  Heme: Denies spontaneous or excessive bleeding, epistaxis, hematuria, melena, fatigue, enlarged or tender lymph nodes.      All other pertinent positives and negatives as noted above in HPI.    Physical Exam: 69 y.o. male  Vitals:    12/09/19 0940   Temp: 98.4 °F (36.9 °C)   TempSrc: Temporal   Weight: 102 kg (225 lb)   Height: 177.8 cm (70\")       General:  Patient is awake and alert.  Appears in no acute distress or discomfort.    Psych:  Affect and demeanor are appropriate.    Eyes:  Conjunctiva and sclera appear grossly normal.  Eyes track well and EOM seem to be intact.    Ears:  No gross abnormalities.  Hearing adequate for the exam.    Cardiovascular:  Regular rate and rhythm.    Lungs:  Good chest expansion.  Breathing unlabored.    Extremities: Right upper extremity:  Skin is benign.  No gross abnormalities on inspection including any atrophy, swellings, or masses.  No palpable masses or adenopathy.  No focal areas of significant tenderness.  Full shoulder motion.  No evident instability or apprehension.  Mildly positive Neer, Mars.  Negative active compression maneuver.  Weakness with forward elevation in the scapular plane.  Good strength with internal and external rotation.  Good strength in the deltoid, biceps, triceps, and .  Intact sensation throughout the arm.  Brisk cap refill.  Palpable radial pulse.  Good skin turgor.    Imaging: AP, scapular Y and axillary views of the right shoulder are ordered and reviewed.  No comparison films are immediately available.  He has a type II acromion.  No lesions, masses, significant degenerative changes or other concerning findings.    Assessment/Plan:  1.  Right shoulder pain and weakness, possible " rotator cuff tear 2.  Diffuse arthralgias, possible chronic Lyme disease    With regards to his shoulder, we discussed a trial of conservative treatment with injections and/or therapy versus an MRI.  He tells me that he has been working on his deck lately and he really wants to get that project finished before Christmas.  He is fearful that the injection may potentially compromise his ability to finish the deck before the holiday.  He also wants to wait until after the holiday to consider an MRI.  For now, what he would like to do is schedule a follow-up visit for sometime in the new year to further discuss a possible injection and/or MRI.  I told him that is fine.    I explained the treatment of chronic Lyme disease is outside of the scope of my practice.  I recommend that he discuss this with his PCP.  I told him I will try to get him the initial work-up started.  I am going to send him for a CBC, sed rate, CRP and Lyme titer.  I told him that I will contact him with the results.  Regardless of the results, I still recommend that he talk with his PCP about this issue.  He acknowledged understanding of this information.    Torres Garcia MD    12/09/2019

## 2019-12-11 LAB
B BURGDOR IGG SER QL: NEGATIVE
B BURGDOR IGM SER QL: NEGATIVE

## 2020-06-09 ENCOUNTER — CONSULT (OUTPATIENT)
Dept: ORTHOPEDIC SURGERY | Facility: CLINIC | Age: 70
End: 2020-06-09

## 2020-06-09 VITALS — HEIGHT: 70 IN | BODY MASS INDEX: 31.64 KG/M2 | TEMPERATURE: 97.3 F | WEIGHT: 221 LBS

## 2020-06-09 DIAGNOSIS — M25.562 LEFT KNEE PAIN, UNSPECIFIED CHRONICITY: Primary | ICD-10-CM

## 2020-06-09 PROCEDURE — 73562 X-RAY EXAM OF KNEE 3: CPT | Performed by: ORTHOPAEDIC SURGERY

## 2020-06-09 PROCEDURE — 99214 OFFICE O/P EST MOD 30 MIN: CPT | Performed by: ORTHOPAEDIC SURGERY

## 2020-06-09 NOTE — PROGRESS NOTES
"willowPatient: Karan Graham  YOB: 1950 70 y.o. male  Medical Record Number: [id number]    Chief Complaints:   Chief Complaint   Patient presents with   • Left Knee - OPSE       History of Present Illness:Karan Graham is a 70 y.o. male who presents with complaints of severe left knee pain he has a stabbing pain about the medial side of the joint.  He has had previous cortisone injection which has been short-lived.  He is now using a cane.  The pain limits his basic activities of daily living.  He can only walk short distances.    Allergies:   Allergies   Allergen Reactions   • Codeine    • Ibuprofen Nausea And Vomiting       Medications:   Current Outpatient Medications   Medication Sig Dispense Refill   • acetaminophen (TYLENOL) 650 MG 8 hr tablet Take  by mouth.     • albuterol (PROVENTIL) (2.5 MG/3ML) 0.083% nebulizer solution Take 2.5 mg by nebulization Every 4 (Four) Hours As Needed for Wheezing.     • benzonatate (TESSALON) 100 MG capsule Take 200 mg by mouth 3 (Three) Times a Day As Needed for Cough.     • meloxicam (MOBIC) 15 MG tablet TK 1 T PO ONCE D PRN.  3   • meloxicam (MOBIC) 7.5 MG tablet Take 7.5 mg by mouth Daily.     • omeprazole (priLOSEC) 20 MG capsule Take 20 mg by mouth Daily.     • tamsulosin (FLOMAX) 0.4 MG capsule 24 hr capsule Take 1 capsule by mouth Daily.     • zolpidem (AMBIEN) 5 MG tablet        No current facility-administered medications for this visit.          The following portions of the patient's history were reviewed and updated as appropriate: allergies, current medications, past family history, past medical history, past social history, past surgical history and problem list.    Review of Systems:   A 14 point review of systems was performed. All systems negative except pertinent positives/negative listed in HPI above    Physical Exam:   Vitals:    06/09/20 1523   Temp: 97.3 °F (36.3 °C)   TempSrc: Temporal   Weight: 100 kg (221 lb)   Height: 177.8 cm (70\") "       General: A and O x 3, ASA, NAD    SCLERA:    Normal    DENTITION:   Normal  Knee:  left    ALIGNMENT:     Varus  ,   Patella  tracks  midline    GAIT:    Antalgic    SKIN:    No abnormality    RANGE OF MOTION:   5  -  110   DEG    STRENGTH:   4  / 5    LIGAMENTS:    No varus / valgus instability.   Negative  Lachman.    MENISCUS:     Negative   Jenae       DISTAL PULSES:    Paplable    DISTAL SENSATION :   Intact    LYMPHATICS:     No   lymphadenopathy    OTHER:          - Positive   effusion      - Crepitance with ROM          Radiology:  Xrays 3views left knee (ap,lateral, sunrise) were ordered and reviewed for evaluation of knee pain demonstrating advanced varus osteoarthritis with bone on bone articulation, subchondral cysts, and periarticular osteophytes. There are no previous films for comparision.    Assessment/Plan: left knee OA - endstage   Continuation of conservative management vs. TKA discussed.  The patient wishes to proceed with total knee replacement.  At this point the patient has failed the full compliment of conservative treatment and stating complete understanding of the risks/benefits/ anternatives wishes to proceed with surgical treatment.    Risk and benefits of surgery were reviewed.  Including, but not limited to, blood clots or pulmonary embolism, anesthesia risk, infection, fracture, skin/leg numbness, persistent pain/crepitance/popping/catching, failure of the implant, need for future surgeries, hematoma, possible nerve or blood vessel injury, need for transfusion, and potential risk of stroke,heart attack or death, among others.  The patient understands and wishes to proceed.     It was explained that if tissue has been repaired or reconstructed, there is also an increased chance of failure which may require further management.  Following the completion of the discussion, the patient expressed understanding of this planned course of care, all their questions were answered and  consent will be obtained preoperatively.    Operative Plan: left [facility] Total Knee Replacement an overnight staywith home health rehab-he will call when ready to proceed    We gave him a cane today given his limitations of activities and balance issues.    Lazaro Jeffers MD  6/9/2020

## 2020-09-08 ENCOUNTER — HOSPITAL ENCOUNTER (OUTPATIENT)
Facility: HOSPITAL | Age: 70
Setting detail: OBSERVATION
Discharge: HOME | End: 2020-09-09
Attending: HOSPITALIST | Admitting: HOSPITALIST

## 2020-09-08 PROBLEM — J45.909 ASTHMA: Status: ACTIVE | Noted: 2020-09-08

## 2020-09-08 PROBLEM — I25.10 CAD (CORONARY ARTERY DISEASE): Status: ACTIVE | Noted: 2020-09-08

## 2020-09-08 PROBLEM — R04.0 BLEEDING FROM THE NOSE: Status: ACTIVE | Noted: 2020-09-08

## 2020-09-08 PROBLEM — G47.30 SLEEP APNEA: Status: ACTIVE | Noted: 2020-09-08

## 2020-09-08 PROBLEM — R04.0 BLEEDING NOSE: Status: ACTIVE | Noted: 2020-09-08

## 2020-09-08 LAB
ALBUMIN SERPL-MCNC: 4 G/DL (ref 3.5–5.2)
ALBUMIN/GLOB SERPL: 1.9 G/DL
ALP SERPL-CCNC: 70 U/L (ref 39–117)
ALT SERPL W P-5'-P-CCNC: 16 U/L (ref 1–41)
ANION GAP SERPL CALCULATED.3IONS-SCNC: 9.9 MMOL/L (ref 5–15)
APTT PPP: 41.4 SECONDS (ref 22.7–35.4)
AST SERPL-CCNC: 18 U/L (ref 1–40)
BASOPHILS # BLD AUTO: 0.02 10*3/MM3 (ref 0–0.2)
BASOPHILS NFR BLD AUTO: 0.4 % (ref 0–1.5)
BILIRUB SERPL-MCNC: 0.9 MG/DL (ref 0–1.2)
BUN SERPL-MCNC: 9 MG/DL (ref 8–23)
BUN/CREAT SERPL: 11.1 (ref 7–25)
CALCIUM SPEC-SCNC: 9.1 MG/DL (ref 8.6–10.5)
CHLORIDE SERPL-SCNC: 103 MMOL/L (ref 98–107)
CO2 SERPL-SCNC: 23.1 MMOL/L (ref 22–29)
CREAT SERPL-MCNC: 0.81 MG/DL (ref 0.76–1.27)
DEPRECATED RDW RBC AUTO: 42.1 FL (ref 37–54)
EOSINOPHIL # BLD AUTO: 0.15 10*3/MM3 (ref 0–0.4)
EOSINOPHIL NFR BLD AUTO: 2.9 % (ref 0.3–6.2)
ERYTHROCYTE [DISTWIDTH] IN BLOOD BY AUTOMATED COUNT: 12.5 % (ref 12.3–15.4)
GFR SERPL CREATININE-BSD FRML MDRD: 95 ML/MIN/1.73
GLOBULIN UR ELPH-MCNC: 2.1 GM/DL
GLUCOSE SERPL-MCNC: 88 MG/DL (ref 65–99)
HCT VFR BLD AUTO: 35.8 % (ref 37.5–51)
HGB BLD-MCNC: 12 G/DL (ref 13–17.7)
IMM GRANULOCYTES # BLD AUTO: 0 10*3/MM3 (ref 0–0.05)
IMM GRANULOCYTES NFR BLD AUTO: 0 % (ref 0–0.5)
INR PPP: 1.07 (ref 0.9–1.1)
LYMPHOCYTES # BLD AUTO: 1.29 10*3/MM3 (ref 0.7–3.1)
LYMPHOCYTES NFR BLD AUTO: 25 % (ref 19.6–45.3)
MCH RBC QN AUTO: 30.7 PG (ref 26.6–33)
MCHC RBC AUTO-ENTMCNC: 33.5 G/DL (ref 31.5–35.7)
MCV RBC AUTO: 91.6 FL (ref 79–97)
MONOCYTES # BLD AUTO: 0.66 10*3/MM3 (ref 0.1–0.9)
MONOCYTES NFR BLD AUTO: 12.8 % (ref 5–12)
NEUTROPHILS NFR BLD AUTO: 3.05 10*3/MM3 (ref 1.7–7)
NEUTROPHILS NFR BLD AUTO: 58.9 % (ref 42.7–76)
NRBC BLD AUTO-RTO: 0 /100 WBC (ref 0–0.2)
PLATELET # BLD AUTO: 146 10*3/MM3 (ref 140–450)
PMV BLD AUTO: 10.8 FL (ref 6–12)
POTASSIUM SERPL-SCNC: 4 MMOL/L (ref 3.5–5.2)
PROT SERPL-MCNC: 6.1 G/DL (ref 6–8.5)
PROTHROMBIN TIME: 13.8 SECONDS (ref 11.7–14.2)
RBC # BLD AUTO: 3.91 10*6/MM3 (ref 4.14–5.8)
SODIUM SERPL-SCNC: 136 MMOL/L (ref 136–145)
WBC # BLD AUTO: 5.17 10*3/MM3 (ref 3.4–10.8)

## 2020-09-08 PROCEDURE — 80053 COMPREHEN METABOLIC PANEL: CPT | Performed by: HOSPITALIST

## 2020-09-08 PROCEDURE — G0378 HOSPITAL OBSERVATION PER HR: HCPCS

## 2020-09-08 PROCEDURE — 85610 PROTHROMBIN TIME: CPT | Performed by: HOSPITALIST

## 2020-09-08 PROCEDURE — 85730 THROMBOPLASTIN TIME PARTIAL: CPT | Performed by: HOSPITALIST

## 2020-09-08 PROCEDURE — U0004 COV-19 TEST NON-CDC HGH THRU: HCPCS | Performed by: HOSPITALIST

## 2020-09-08 PROCEDURE — 85025 COMPLETE CBC W/AUTO DIFF WBC: CPT | Performed by: HOSPITALIST

## 2020-09-08 PROCEDURE — C9803 HOPD COVID-19 SPEC COLLECT: HCPCS | Performed by: HOSPITALIST

## 2020-09-08 PROCEDURE — G0379 DIRECT REFER HOSPITAL OBSERV: HCPCS

## 2020-09-08 RX ORDER — ACETAMINOPHEN 160 MG/5ML
650 SOLUTION ORAL EVERY 4 HOURS PRN
Status: DISCONTINUED | OUTPATIENT
Start: 2020-09-08 | End: 2020-09-09 | Stop reason: HOSPADM

## 2020-09-08 RX ORDER — NITROGLYCERIN 0.4 MG/1
0.4 TABLET SUBLINGUAL
Status: DISCONTINUED | OUTPATIENT
Start: 2020-09-08 | End: 2020-09-09 | Stop reason: HOSPADM

## 2020-09-08 RX ORDER — ASPIRIN 81 MG/1
81 TABLET, CHEWABLE ORAL DAILY
Status: DISCONTINUED | OUTPATIENT
Start: 2020-09-08 | End: 2020-09-09 | Stop reason: HOSPADM

## 2020-09-08 RX ORDER — ATORVASTATIN CALCIUM 20 MG/1
80 TABLET, FILM COATED ORAL NIGHTLY
Status: DISCONTINUED | OUTPATIENT
Start: 2020-09-08 | End: 2020-09-09 | Stop reason: HOSPADM

## 2020-09-08 RX ORDER — ZOLPIDEM TARTRATE 5 MG/1
5 TABLET ORAL NIGHTLY PRN
Status: DISCONTINUED | OUTPATIENT
Start: 2020-09-08 | End: 2020-09-09 | Stop reason: HOSPADM

## 2020-09-08 RX ORDER — METOPROLOL SUCCINATE 25 MG/1
12.5 TABLET, EXTENDED RELEASE ORAL DAILY
Status: DISCONTINUED | OUTPATIENT
Start: 2020-09-08 | End: 2020-09-09 | Stop reason: HOSPADM

## 2020-09-08 RX ORDER — TAMSULOSIN HYDROCHLORIDE 0.4 MG/1
0.4 CAPSULE ORAL 2 TIMES DAILY
Status: DISCONTINUED | OUTPATIENT
Start: 2020-09-08 | End: 2020-09-09 | Stop reason: HOSPADM

## 2020-09-08 RX ORDER — ATORVASTATIN CALCIUM 80 MG/1
80 TABLET, FILM COATED ORAL NIGHTLY
COMMUNITY

## 2020-09-08 RX ORDER — SODIUM CHLORIDE 0.9 % (FLUSH) 0.9 %
10 SYRINGE (ML) INJECTION AS NEEDED
Status: DISCONTINUED | OUTPATIENT
Start: 2020-09-08 | End: 2020-09-09 | Stop reason: HOSPADM

## 2020-09-08 RX ORDER — ONDANSETRON 2 MG/ML
4 INJECTION INTRAMUSCULAR; INTRAVENOUS EVERY 6 HOURS PRN
Status: DISCONTINUED | OUTPATIENT
Start: 2020-09-08 | End: 2020-09-09 | Stop reason: HOSPADM

## 2020-09-08 RX ORDER — ASPIRIN 81 MG/1
81 TABLET, CHEWABLE ORAL DAILY
COMMUNITY

## 2020-09-08 RX ORDER — ACETAMINOPHEN 325 MG/1
650 TABLET ORAL EVERY 4 HOURS PRN
Status: DISCONTINUED | OUTPATIENT
Start: 2020-09-08 | End: 2020-09-09 | Stop reason: HOSPADM

## 2020-09-08 RX ORDER — ACETAMINOPHEN 650 MG/1
650 SUPPOSITORY RECTAL EVERY 4 HOURS PRN
Status: DISCONTINUED | OUTPATIENT
Start: 2020-09-08 | End: 2020-09-09 | Stop reason: HOSPADM

## 2020-09-08 RX ORDER — METOPROLOL SUCCINATE 25 MG/1
12.5 TABLET, EXTENDED RELEASE ORAL DAILY
COMMUNITY

## 2020-09-08 RX ORDER — SODIUM CHLORIDE 0.9 % (FLUSH) 0.9 %
10 SYRINGE (ML) INJECTION EVERY 12 HOURS SCHEDULED
Status: DISCONTINUED | OUTPATIENT
Start: 2020-09-08 | End: 2020-09-09 | Stop reason: HOSPADM

## 2020-09-08 RX ORDER — ALBUTEROL SULFATE 2.5 MG/3ML
2.5 SOLUTION RESPIRATORY (INHALATION) EVERY 4 HOURS PRN
Status: DISCONTINUED | OUTPATIENT
Start: 2020-09-08 | End: 2020-09-09 | Stop reason: HOSPADM

## 2020-09-08 RX ORDER — ONDANSETRON 4 MG/1
4 TABLET, FILM COATED ORAL EVERY 6 HOURS PRN
Status: DISCONTINUED | OUTPATIENT
Start: 2020-09-08 | End: 2020-09-09 | Stop reason: HOSPADM

## 2020-09-08 RX ORDER — NITROGLYCERIN 0.3 MG/1
0.4 TABLET SUBLINGUAL
COMMUNITY
End: 2022-08-12 | Stop reason: DRUGHIGH

## 2020-09-08 RX ORDER — PANTOPRAZOLE SODIUM 40 MG/1
40 TABLET, DELAYED RELEASE ORAL EVERY MORNING
Status: DISCONTINUED | OUTPATIENT
Start: 2020-09-09 | End: 2020-09-09 | Stop reason: HOSPADM

## 2020-09-08 RX ADMIN — TAMSULOSIN HYDROCHLORIDE 0.4 MG: 0.4 CAPSULE ORAL at 21:35

## 2020-09-08 RX ADMIN — ASPIRIN 81 MG: 81 TABLET, CHEWABLE ORAL at 21:35

## 2020-09-08 RX ADMIN — TICAGRELOR 90 MG: 90 TABLET ORAL at 21:35

## 2020-09-08 RX ADMIN — ACETAMINOPHEN 650 MG: 325 TABLET, FILM COATED ORAL at 17:17

## 2020-09-08 RX ADMIN — ATORVASTATIN CALCIUM 80 MG: 20 TABLET, FILM COATED ORAL at 21:35

## 2020-09-08 RX ADMIN — SODIUM CHLORIDE, PRESERVATIVE FREE 10 ML: 5 INJECTION INTRAVENOUS at 14:40

## 2020-09-08 RX ADMIN — SODIUM CHLORIDE, PRESERVATIVE FREE 10 ML: 5 INJECTION INTRAVENOUS at 21:36

## 2020-09-08 RX ADMIN — METOPROLOL SUCCINATE 12.5 MG: 25 TABLET, EXTENDED RELEASE ORAL at 21:35

## 2020-09-08 NOTE — CONSULTS
Karan Graham   70 y.o.  male    LOS: 0 days   Patient Care Team:  Jerald Gonzalez MD as PCP - General (Internal Medicine)      Subjective     Chief Complaint:  nosebleed    History of Present Illness:   70-year-old white male with history of coronary artery disease s/p PCI/ stent to mid LAD by Dr. Burt on 06/26/2020 and PCI to PDA on 08/13/20 was admitted to an OSH with bowel obstruction.  Patient required insertion of NG tube which was discontinued once bowel obstruction resolved.  Patient began to have a nosebleed from the NG tube removal and required nasal packing. No ENT available at OSH; therefore, patient transferred to [facility].   Patient is on DAPT with aspirin and Brilinta post RALEIGH placement.       Patient hemodynamically stable. Hb 12, now 11.9. Unless hemodynamically unstable, patient should continue DAPT with aspirin and Plavix for one year uninterrupted to avoid risk of in- stent restenosis.     Past Medical History:   Diagnosis Date   • Asthma    • CAD (coronary artery disease)     hx stents june 2020   • Pancreas (digestive gland) works poorly    • Skin cancer    • Sleep apnea      Past Surgical History:   Procedure Laterality Date   • BACK SURGERY     • CHOLECYSTECTOMY     • CORONARY ANGIOPLASTY WITH STENT PLACEMENT  06/2020    Dr Munoz   • EYELID LACERATION REPAIR     • HEMORRHOIDECTOMY     • PANCREAS SURGERY     • SHOULDER SURGERY     • TUMOR REMOVAL      Roof Of Mouth     Medications Prior to Admission   Medication Sig Dispense Refill Last Dose   • acetaminophen (TYLENOL) 650 MG 8 hr tablet Take 325 mg by mouth Every 4 (Four) Hours As Needed for Mild Pain .   9/7/2020 at Unknown time   • albuterol (PROVENTIL) (2.5 MG/3ML) 0.083% nebulizer solution Take 2.5 mg by nebulization Every 4 (Four) Hours As Needed for Wheezing.   Past Month at Unknown time   • aspirin 81 MG chewable tablet Chew 81 mg Daily.   Past Week at Unknown time   • atorvastatin (LIPITOR) 80 MG tablet Take 80 mg by mouth  Every Night.   Past Week at Unknown time   • camphor-menthol (SARNA) 0.5-0.5 % lotion Apply  topically to the appropriate area as directed As Needed for Itching.      • metoprolol succinate XL (TOPROL-XL) 25 MG 24 hr tablet Take 12.5 mg by mouth Daily.   Past Week at Unknown time   • nitroglycerin (NITROSTAT) 0.3 MG SL tablet Place 0.4 mg under the tongue Every 5 (Five) Minutes As Needed for Chest Pain. Take no more than 3 doses in 15 minutes.   Past Month at Unknown time   • omeprazole (priLOSEC) 20 MG capsule Take 20 mg by mouth Daily.   Past Week at Unknown time   • tamsulosin (FLOMAX) 0.4 MG capsule 24 hr capsule Take 1 capsule by mouth 2 (Two) Times a Day.   Past Week at Unknown time   • ticagrelor (BRILINTA) 90 MG tablet tablet Take 90 mg by mouth 2 (Two) Times a Day.   9/7/2020 at Unknown time   • zolpidem (AMBIEN) 5 MG tablet At Night As Needed.   Past Month at Unknown time   • benzonatate (TESSALON) 100 MG capsule Take 200 mg by mouth 3 (Three) Times a Day As Needed for Cough.   Not Taking   • meloxicam (MOBIC) 15 MG tablet TK 1 T PO ONCE D PRN.  3 Taking   • meloxicam (MOBIC) 7.5 MG tablet Take 7.5 mg by mouth Daily.   Not Taking       Family History   Problem Relation Age of Onset   • No Known Problems Mother    • No Known Problems Father    • No Known Problems Sister    • No Known Problems Brother    • No Known Problems Maternal Aunt    • No Known Problems Maternal Uncle    • No Known Problems Paternal Aunt    • No Known Problems Paternal Uncle    • No Known Problems Maternal Grandmother    • No Known Problems Maternal Grandfather    • No Known Problems Paternal Grandmother    • No Known Problems Paternal Grandfather    • Cancer Other    • Anesthesia problems Neg Hx    • Broken bones Neg Hx    • Clotting disorder Neg Hx    • Collagen disease Neg Hx    • Diabetes Neg Hx    • Dislocations Neg Hx    • Osteoporosis Neg Hx    • Rheumatologic disease Neg Hx    • Scoliosis Neg Hx    • Severe sprains Neg Hx       Social History     Socioeconomic History   • Marital status:      Spouse name: Not on file   • Number of children: Not on file   • Years of education: Not on file   • Highest education level: Not on file   Tobacco Use   • Smoking status: Former Smoker     Types: Pipe     Last attempt to quit: 1980     Years since quittin.7   • Smokeless tobacco: Never Used   Substance and Sexual Activity   • Alcohol use: Yes     Comment: OCCASIONAL   • Drug use: No   • Sexual activity: Defer     Objective       Review of Systems:   Constitutional: Negative for diaphoresis, fatigue, fever and unexpected weight change.   HENT: Negative.    Eyes: Negative.    Respiratory: Negative for cough, shortness of breath and wheezing.    Cardiovascular: Negative for chest pain, palpitations and leg swelling.   Gastrointestinal: Negative for abdominal pain, blood in stool, constipation, diarrhea, nausea and vomiting.   Endocrine: Negative.    Genitourinary: Negative for difficulty urinating, dysuria and frequency.   Musculoskeletal: Negative.    Skin: Negative.    Allergic/Immunologic: Negative for environmental allergies and food allergies.   Neurological: Negative.    Hematological: Negative.    Psychiatric/Behavioral: Negative.        Current Facility-Administered Medications:   •  acetaminophen (TYLENOL) tablet 650 mg, 650 mg, Oral, Q4H PRN, 650 mg at 20 0734 **OR** acetaminophen (TYLENOL) 160 MG/5ML solution 650 mg, 650 mg, Oral, Q4H PRN **OR** acetaminophen (TYLENOL) suppository 650 mg, 650 mg, Rectal, Q4H PRN, Sam Wu MD  •  albuterol (PROVENTIL) nebulizer solution 0.083% 2.5 mg/3mL, 2.5 mg, Nebulization, Q4H PRN, Sam Wu MD  •  aspirin chewable tablet 81 mg, 81 mg, Oral, Daily, Sam Wu MD, 81 mg at 20  •  atorvastatin (LIPITOR) tablet 80 mg, 80 mg, Oral, Nightly, Sam Wu MD, 80 mg at 20  •  metoprolol succinate XL (TOPROL-XL) 24 hr tablet 12.5 mg, 12.5 mg, Oral,  Daily, Sam Wu MD, 12.5 mg at 09/08/20 2135  •  nitroglycerin (NITROSTAT) SL tablet 0.4 mg, 0.4 mg, Sublingual, Q5 Min PRN, Sam Wu MD  •  ondansetron (ZOFRAN) tablet 4 mg, 4 mg, Oral, Q6H PRN **OR** ondansetron (ZOFRAN) injection 4 mg, 4 mg, Intravenous, Q6H PRN, Sam Wu MD  •  pantoprazole (PROTONIX) EC tablet 40 mg, 40 mg, Oral, QAM, Sam Wu MD, 40 mg at 09/09/20 0603  •  sodium chloride 0.9 % flush 10 mL, 10 mL, Intravenous, Q12H, Sam Wu MD, 10 mL at 09/08/20 2136  •  sodium chloride 0.9 % flush 10 mL, 10 mL, Intravenous, PRN, Sam Wu MD  •  tamsulosin (FLOMAX) 24 hr capsule 0.4 mg, 0.4 mg, Oral, BID, Sam Wu MD, 0.4 mg at 09/08/20 2135  •  ticagrelor (BRILINTA) tablet 90 mg, 90 mg, Oral, BID, Sam Wu MD, 90 mg at 09/08/20 2135  •  zolpidem (AMBIEN) tablet 5 mg, 5 mg, Oral, Nightly PRN, Sam Wu MD, 5 mg at 09/09/20 0323      Physical Exam:   Vital Sign Min/Max for last 24 hours  Temp  Min: 97.7 °F (36.5 °C)  Max: 99.1 °F (37.3 °C)   BP  Min: 130/77  Max: 154/78    Pulse  Min: 65  Max: 73     Wt Readings from Last 3 Encounters:   09/08/20 96.2 kg (212 lb)   06/09/20 100 kg (221 lb)   12/09/19 102 kg (225 lb)       Physical Exam:  General: No acute distress, dressed in ready to go home  HEENT: Head atraumatic, normocephalic, PERRLA  Neuro: Alert and oriented x3, no focal deficits  CVS: S1-S2, no S3 or S4, no murmurs rubs or clicks, regular rate and rhythm  Respiratory: To auscultation, on room air  GI: Abdomen soft, positive bowel sounds, nontender  : Voids  Extremities: No clubbing cyanosis or edema, all peripheral pulses palpable      MONITOR: not on tele    Results Review:     Sodium Sodium   Date Value Ref Range Status   09/09/2020 140 136 - 145 mmol/L Final   09/08/2020 136 136 - 145 mmol/L Final      Potassium Potassium   Date Value Ref Range Status   09/09/2020 3.5 3.5 - 5.2 mmol/L Final   09/08/2020 4.0 3.5 - 5.2 mmol/L Final      Chloride  Chloride   Date Value Ref Range Status   09/09/2020 104 98 - 107 mmol/L Final   09/08/2020 103 98 - 107 mmol/L Final      Bicarbonate No results found for: PLASMABICARB   BUN BUN   Date Value Ref Range Status   09/09/2020 7 (L) 8 - 23 mg/dL Final   09/08/2020 9 8 - 23 mg/dL Final      Creatinine Creatinine   Date Value Ref Range Status   09/09/2020 0.78 0.76 - 1.27 mg/dL Final   09/08/2020 0.81 0.76 - 1.27 mg/dL Final      Calcium Calcium   Date Value Ref Range Status   09/09/2020 9.0 8.6 - 10.5 mg/dL Final   09/08/2020 9.1 8.6 - 10.5 mg/dL Final      Magnesium No results found for: MG     Results from last 7 days   Lab Units 09/09/20  0549   WBC 10*3/mm3 5.16   HEMOGLOBIN g/dL 11.9*   HEMATOCRIT % 33.7*   PLATELETS 10*3/mm3 147     Lab Results   Lab Value Date/Time    TROPONINT <0.010 03/23/2017 0409    TROPONINT <0.010 03/22/2017 1035     Lab Results   Component Value Date    CHOL 111 03/23/2017     Lab Results   Component Value Date    HDL 27 (L) 06/22/2018    HDL 19 (L) 03/23/2017     Lab Results   Component Value Date    LDL 64 06/22/2018    LDL 73 03/23/2017     Lab Results   Component Value Date    TRIG 246 (H) 06/22/2018    TRIG 96 03/23/2017     No components found for: CHOLHDL  PTT   Date Value Ref Range Status   09/08/2020 41.4 (H) 22.7 - 35.4 seconds Final     No components found for: PT/INR  Lab Results   Component Value Date    HGBA1C 5.4 06/21/2018      Lab Results   Component Value Date    TSH 0.776 03/23/2017      Left Heart Cath 06/26/2020:  revealed LAD mid 70% stenosis, IFR positive at 0.85 subsequently underwent RALEIGH with Promus 3.0 x 28 post dilated with 3.5 x20 mm    Left heart cath 06/26/2020  Cardiac Arteries and Lesion Findings:  LMCA: Normal (no stenosis %). Left main coronary artery is a large caliber vessel which gives rise to LAD and LCX. No  angiographically significant atherosclerotic disease.  LAD: Abnormal. LAD is large caliber vessel which gives rise to diagonals and multiple septal  perforators. There is 80% stenosis  in proximal to mid segment just before bifurcation of second and third diagonal. Second diagonal is medium caliber vessel with  luminal irregularities. D3 is medium caliber vessel which has 50% stenosis in proximal segment  Lesion on Mid LAD: Mid subsection. 80% stenosis 24 mm length. Pre procedure CALRISA III flow was noted. Good runoff  was present. The lesion was diagnosed as High Risk (C). Bifurcation lesion.  Devices used  l NC Emerge MR Balloon 3.0 x 20. 1 inflation(s) to a max pressure of: 8 enrique.  l Promus ELITE MR 3.0x28. 1 inflation(s) to a max pressure of: 16 enrique.  l NC Emerge MR Balloon 3.5 x 20. 2 inflation(s) to a max pressure of: 16 enrique.  LCx: Abnormal. LCX is a large caliber vessel which gives rise to OM branches. There is 30% stenosis in distal segment.  RCA: Abnormal. RCA is a large sized vessel that gives rise to the posterior descending and posteriolateral branches. Luminal  irregularities seen  LAD:  Lesion on Mid LAD: Mid subsection. 80% stenosis 24 mm length reduced to 0%. Pre procedure CLARISA III flow was  noted. Post Procedure CLARISA III flow was present. Good runoff was present. The lesion was diagnosed as High Risk (C).  LV function assessed as: Normal.    Left Heart Cath 8/13/20:  - Severe 2 vessel CAD  - PDA branch of RCA has 80% stenosis  - successful PCI of PDA  - Normal LVEDP  - LVEF 55%  - No gradient was identified across the aortic valve on pullback.  Final diagnosis: Severe 2 vessel CAD  Patent previous mid LAD stent  Successful PCI of PDA branch of RCA using XIENCE Brea 2.5x23 mm RALEIGH post dilated with 3.5 x 8 mm NC balloon  Normal LVEF    Left heart cath 08/13/20   Cardiac Arteries and Lesion Findings:  LMCA: Left main coronary artery is a large caliber vessel which gives rise to LAD and LCX. No angiographically significant  atherosclerotic disease.  LAD: LAD is large caliber vessel which gives rise to diagonals and multiple septal perforators.  It  has patent stent in the proximal segment.  D1 is very small vessel  D2 is medium caliber vessel which has luminal irregularities.  D3 has 50% stenosis in proximal segment, There is a previous stent on Mid LAD Mid subsection. Device: Promus ELITE MR  3.0x28 Diameter: Length: Deployed on: 6/ 2020  LCx: LCX is a large caliber vessel which gives rise to OM branches.  It is diffusely diseased in the distal segment.  RCA: RCA is a large sized vessel that gives rise to the posterior descending and posteriolateral branches.  There is 80% stenosis in the proximal segment of PDA  Lesion on R PDA: Ostial. 80% stenosis 16 mm length. Pre procedure CLARISA III flow was noted. Good runoff was  present. The lesion was diagnosed as High Risk (C). Culprit lesion.  LV function assessed as: Normal.  Method: LV gram. EF%: 55    2D Echo 08/12/2020:  LVEF 55%, no valvular abnormalities, elevated mean LAP     Assessment/ Plan  1. Epistaxis  2. CAD s/p PCI/RALEIGH to mid LAD 06/26/2020, PCI PDA 08/13/20  - on DAPT with asa/Brilinta x one year uninterrupted, high dose statin and BB  3. Untreated REGULO  4. RBBB    Continue DAPT with ASA/ Brilinta to avoid in-stent restenosis.  Appears patient is hemodynamically stable.  No change in Hb overnight.  Continue to monitor Hb/ VS.  ENT following and will see in office tomorrow.  Patient discharging today. Has follow up with Dr. Burt scheduled next week. Will send prescription for lose dose nitrate.   AMINAH Ferrer  09/09/20  09:02  Patient seen and examined.  Agree with the note above by AMINAH Colindres.  Patient admitted here with nosebleed.  He also had some mild chest discomfort.  He has no shortness of air no other cardiac symptoms.  He has the history of stent to the mid LAD and angioplasty of the PDA earlier this year as indicated above.  This morning he is stable with no chest discomfort.  Been seen by Dr. Em who is going to continue with the nasal packing and see him in the  office tomorrow for more definitive treatment.  He is stable cardiac wise at this time and okay for discharge.  We are adding some low-dose oral nitrates in view of the chest discomfort he experienced.  He will continue with his aspirin and Brilinta.  He has an appointment to see Dr. Burt the first of next week.  Discussed with patient and wife.  Also discussed with Dr. Wu.  Mike Peñaloza MD  Time: 40 min

## 2020-09-08 NOTE — H&P
HISTORY AND PHYSICAL   [facility]        Patient Identification:  Name: Karan Graham  Age: 70 y.o.  Sex: male  :  1950  MRN: [id number]                     Primary Care Physician: Jerald Gonzalez MD    Chief Complaint:  Nose bleed    History of Present Illness:         The patient is a 70-year-old white male with history of asthma, coronary artery disease, pancreatic problems, and skin sleep apnea who recently been admitted to the hospital in [facility] with bowel obstruction.  He had NG in place and his bowel obstruction had resolved and he started having bowel movements and was able to eat.  He developed a nosebleed from from the NG tube and required some nasal packing by the hospitalist there.  They had no ENT available and patient transferred to [facility] for ENT consult.  He recently had cardiac stents placed in  at [facility] with Dr. Munoz.  He has been on aspirin and Brilinta.  The patient had bilateral nasal packing and is still having some posterior bleeding.  The patient was admitted directly from [facility] to [facility] for further evaluation treatment of his nosebleeds.    Past Medical History:  Past Medical History:   Diagnosis Date   • Asthma    • CAD (coronary artery disease)     hx stents 2020   • Pancreas (digestive gland) works poorly    • Skin cancer    • Sleep apnea      Past Surgical History:  Past Surgical History:   Procedure Laterality Date   • BACK SURGERY     • CHOLECYSTECTOMY     • CORONARY ANGIOPLASTY WITH STENT PLACEMENT  2020    Dr Munoz   • EYELID LACERATION REPAIR     • HEMORRHOIDECTOMY     • PANCREAS SURGERY     • SHOULDER SURGERY     • TUMOR REMOVAL      Roof Of Mouth      Home Meds:  Medications Prior to Admission   Medication Sig Dispense Refill Last Dose   • acetaminophen (TYLENOL) 650 MG 8 hr tablet Take  by mouth.   Taking   • albuterol (PROVENTIL) (2.5 MG/3ML) 0.083% nebulizer solution Take  2.5 mg by nebulization Every 4 (Four) Hours As Needed for Wheezing.   Not Taking   • benzonatate (TESSALON) 100 MG capsule Take 200 mg by mouth 3 (Three) Times a Day As Needed for Cough.   Not Taking   • meloxicam (MOBIC) 15 MG tablet TK 1 T PO ONCE D PRN.  3 Taking   • meloxicam (MOBIC) 7.5 MG tablet Take 7.5 mg by mouth Daily.   Not Taking   • omeprazole (priLOSEC) 20 MG capsule Take 20 mg by mouth Daily.   Taking   • tamsulosin (FLOMAX) 0.4 MG capsule 24 hr capsule Take 1 capsule by mouth Daily.   Taking   • zolpidem (AMBIEN) 5 MG tablet    Not Taking     Current meds    Current Facility-Administered Medications:   •  acetaminophen (TYLENOL) tablet 650 mg, 650 mg, Oral, Q4H PRN **OR** acetaminophen (TYLENOL) 160 MG/5ML solution 650 mg, 650 mg, Oral, Q4H PRN **OR** acetaminophen (TYLENOL) suppository 650 mg, 650 mg, Rectal, Q4H PRN, Sam Wu MD  •  ondansetron (ZOFRAN) tablet 4 mg, 4 mg, Oral, Q6H PRN **OR** ondansetron (ZOFRAN) injection 4 mg, 4 mg, Intravenous, Q6H PRN, Sam Wu MD  •  sodium chloride 0.9 % flush 10 mL, 10 mL, Intravenous, Q12H, Sam Wu MD  •  sodium chloride 0.9 % flush 10 mL, 10 mL, Intravenous, PRN, Sam Wu MD  Allergies:  Allergies   Allergen Reactions   • Codeine    • Ibuprofen Nausea And Vomiting     Immunizations:    There is no immunization history on file for this patient.  Social History:   Social History     Social History Narrative   • Not on file     Social History     Socioeconomic History   • Marital status:      Spouse name: Not on file   • Number of children: Not on file   • Years of education: Not on file   • Highest education level: Not on file   Tobacco Use   • Smoking status: Former Smoker     Types: Pipe     Last attempt to quit: 1980     Years since quittin.7   • Smokeless tobacco: Never Used   Substance and Sexual Activity   • Alcohol use: Yes     Comment: OCCASIONAL   • Drug use: No   • Sexual activity: Defer       Family  History:  Family History   Problem Relation Age of Onset   • No Known Problems Mother    • No Known Problems Father    • No Known Problems Sister    • No Known Problems Brother    • No Known Problems Maternal Aunt    • No Known Problems Maternal Uncle    • No Known Problems Paternal Aunt    • No Known Problems Paternal Uncle    • No Known Problems Maternal Grandmother    • No Known Problems Maternal Grandfather    • No Known Problems Paternal Grandmother    • No Known Problems Paternal Grandfather    • Cancer Other    • Anesthesia problems Neg Hx    • Broken bones Neg Hx    • Clotting disorder Neg Hx    • Collagen disease Neg Hx    • Diabetes Neg Hx    • Dislocations Neg Hx    • Osteoporosis Neg Hx    • Rheumatologic disease Neg Hx    • Scoliosis Neg Hx    • Severe sprains Neg Hx         Review of Systems  See history of present illness and past medical history.  Patient denies headache, dizziness, syncope, falls, trauma, change in vision, change in hearing, change in taste, changes in weight, changes in appetite, focal weakness, numbness, or paresthesia.  Patient denies chest pain, palpitations, dyspnea, orthopnea, PND, cough, sinus pressure, rhinorrhea, epistaxis, hemoptysis, nausea, vomiting, hematemesis, diarrhea, constipation or hematchezia.  Denies cold or heat intolerance, polydipsia, polyuria, polyphagia. Denies hematuria, pyuria, dysuria, hesitancy, frequency or urgency.   Denies fever, chills, sweats, night sweats.   Remainder of ROS is negative.    Objective:  tMax 24 hrs: Temp (24hrs), Av.6 °F (37 °C), Min:98.6 °F (37 °C), Max:98.6 °F (37 °C)    Vitals Ranges:   Temp:  [98.6 °F (37 °C)] 98.6 °F (37 °C)  Heart Rate:  [66] 66  Resp:  [16] 16  BP: (154)/(78) 154/78      Exam:  /78 (BP Location: Right arm, Patient Position: Lying)   Pulse 66   Temp 98.6 °F (37 °C) (Oral)   Resp 16   SpO2 95%     General Appearance:    Alert, cooperative, no distress, appears stated age   Head:    Normocephalic,  without obvious abnormality, atraumatic   Eyes:    PERRL, conjunctivae/corneas clear, EOM's intact, both eyes   Ears:    Normal external ear canals, both ears   Nose:  Bilateral nasal packings   Throat:   Lips, mucosa, and tongue normal   Neck:   Supple, symmetrical, trachea midline, no adenopathy;     thyroid:  no enlargement/tenderness/nodules; no carotid    bruit or JVD   Back:     Symmetric, no curvature, ROM normal, no CVA tenderness   Lungs:     Clear to auscultation bilaterally, respirations unlabored   Chest Wall:    No tenderness or deformity    Heart:    Regular rate and rhythm, S1 and S2 normal, no murmur, rub   or gallop   Abdomen:     Soft, nontender, bowel sounds active all four quadrants,     no masses, no hepatomegaly, no splenomegaly   Extremities:   Extremities normal, atraumatic, no cyanosis or edema   Pulses:   2+ and symmetric all extremities   Skin:   Skin color, texture, turgor normal, no rashes or lesions   Lymph nodes:   Cervical, supraclavicular, and axillary nodes normal   Neurologic:   CNII-XII intact, normal strength, sensation intact throughout      .    Data Review:  Lab Results (last 72 hours)     ** No results found for the last 72 hours. **                   Imaging Results (All)     None        Past Medical History:   Diagnosis Date   • Asthma    • CAD (coronary artery disease)     hx stents june 2020   • Pancreas (digestive gland) works poorly    • Skin cancer    • Sleep apnea        Assessment:  Active Hospital Problems    Diagnosis  POA   • **Bleeding from the nose [R04.0]  Unknown   • Bleeding nose [R04.0]  Yes   • CAD (coronary artery disease) [I25.10]  Unknown   • Asthma [J45.909]  Unknown   • Sleep apnea [G47.30]  Unknown      Resolved Hospital Problems   No resolved problems to display.       Plan:  The patient's admitted to [facility] with ENT and cardiology consults.  We will get input from cardiology asked to whether or not we can hold the aspirin Brilinta  and for how long.  We will get some follow-up labs and try and get records from [facility] as they did not send any paperwork along with him.    Sam Wu MD  9/8/2020  14:10

## 2020-09-08 NOTE — PROGRESS NOTES
Discharge Planning Assessment  [facility]     Patient Name: Karan Graham  MRN: [id number]  Today's Date: 9/8/2020    Admit Date: 9/8/2020    Discharge Needs Assessment     Row Name 09/08/20 1640       Living Environment    Lives With  spouse    Name(s) of Who Lives With Patient  spouse Kymberly Graham [phone]     Current Living Arrangements  home/apartment/condo    Primary Care Provided by  self;spouse/significant other    Provides Primary Care For  no one, unable/limited ability to care for self    Family Caregiver if Needed  spouse    Family Caregiver Names  spouse Kymberly Graham [phone]     Quality of Family Relationships  helpful;involved;supportive    Able to Return to Prior Arrangements  yes       Resource/Environmental Concerns    Resource/Environmental Concerns  home accessibility    Home Accessibility Concerns  stairs to enter home;stairs to access bedroom or bathroom has 1 step to enter his 2 story home with about 10-12 steps with 1 handrail to get to the bedrooms in the home.    Transportation Concerns  car, none       Transition Planning    Patient/Family Anticipates Transition to  home with family    Patient/Family Anticipated Services at Transition  none    Transportation Anticipated  family or friend will provide       Discharge Needs Assessment    Concerns to be Addressed  denies needs/concerns at this time    Equipment Currently Used at Home  cane, straight    Anticipated Changes Related to Illness  none    Equipment Needed After Discharge  none    Current Discharge Risk  physical impairment        Discharge Plan     Row Name 09/08/20 1637       Plan    Plan  Plans home; denies needs.     Provided Post Acute Provider List?  Yes    Post Acute Provider List  Home Health;Nursing Home    Provided Post Acute Provider Quality & Resource List?  N/A    N/A Quality & Resource List Comment  The patient was provided with a HH/SNF list and not a print out of the HH/nursing home compare list from  Medicare.gov as he currently denies any d/c needs.    Delivered To  Patient    Method of Delivery  In person    Patient/Family in Agreement with Plan  yes    Plan Comments  Met with the patient and his spouse Kymberly Graham [phone] at bedside; explained role of CCP, discussed [place] notice (copy provided to the patient's spouse, copy placed in HIM basket, copy placed in patient's chart), verified facesheet and discussed discharge planning needs.  The patient plans to return to his home in [place], [place] upon d/c with assistance from his spouse.  The patient has a cane, has 1 step to enter his 2 story home with about 10-12 steps with 1 handrail to get to the bedrooms in the home.  The patient's PCP is Jerald Gonzalez, pharmacy is [facility] in [place] and the patient denies any trouble remembering to take his medication or with affording his medication as his wife assists him.  The patient denies any HH/SNF history, was encouraged to bring his POA documents, states that he drives himself to his appointments and his spouse will drive him home upon d/c.  The patient was provided with a HH/SNF list and not a print out of the HH/nursing home compare list from Medicare.gov as he currently denies any d/c needs. CCP will follow to assist with any d/c needs that may arise.  JESE Duong        Destination      Coordination has not been started for this encounter.      Durable Medical Equipment      Coordination has not been started for this encounter.      Dialysis/Infusion      Coordination has not been started for this encounter.      Home Medical Care      Coordination has not been started for this encounter.      Therapy      Coordination has not been started for this encounter.      Community Resources      Coordination has not been started for this encounter.        Expected Discharge Date and Time     Expected Discharge Date Expected Discharge Time    Sep 9, 2020         Demographic Summary     Row Name 09/08/20  1639       General Information    Admission Type  observation    Arrived From  home    Required Notices Provided  Observation Status Notice    Referral Source  admission list    Reason for Consult  discharge planning    Preferred Language  English     Used During This Interaction  no       Contact Information    Permission Granted to Share Info With  family/designee        Functional Status     Row Name 09/08/20 1639       Functional Status    Usual Activity Tolerance  good    Current Activity Tolerance  moderate       Functional Status, IADL    Medications  independent    Meal Preparation  independent    Housekeeping  independent    Laundry  independent    Shopping  independent       Mental Status    General Appearance WDL  WDL       Mental Status Summary    Recent Changes in Mental Status/Cognitive Functioning  no changes        Psychosocial    No documentation.       Abuse/Neglect    No documentation.       Legal    No documentation.       Substance Abuse    No documentation.       Patient Forms     Row Name 09/08/20 1638       Patient Forms    Provider Choice List  Delivered    Delivered to  Support person spouse Kymberly Graham [phone]     Method of delivery  In person    Patient Observation Letter  Delivered    Delivered to  Support person spouse Kymberly Graham [phone]     Method of delivery  In person            JESE Coto

## 2020-09-09 VITALS
HEIGHT: 70 IN | DIASTOLIC BLOOD PRESSURE: 77 MMHG | BODY MASS INDEX: 30.35 KG/M2 | OXYGEN SATURATION: 97 % | TEMPERATURE: 97.7 F | HEART RATE: 68 BPM | SYSTOLIC BLOOD PRESSURE: 130 MMHG | WEIGHT: 212 LBS | RESPIRATION RATE: 18 BRPM

## 2020-09-09 LAB
ANION GAP SERPL CALCULATED.3IONS-SCNC: 10.6 MMOL/L (ref 5–15)
BASOPHILS # BLD AUTO: 0.04 10*3/MM3 (ref 0–0.2)
BASOPHILS NFR BLD AUTO: 0.8 % (ref 0–1.5)
BUN SERPL-MCNC: 7 MG/DL (ref 8–23)
BUN/CREAT SERPL: 9 (ref 7–25)
CALCIUM SPEC-SCNC: 9 MG/DL (ref 8.6–10.5)
CHLORIDE SERPL-SCNC: 104 MMOL/L (ref 98–107)
CO2 SERPL-SCNC: 25.4 MMOL/L (ref 22–29)
CREAT SERPL-MCNC: 0.78 MG/DL (ref 0.76–1.27)
DEPRECATED RDW RBC AUTO: 39.9 FL (ref 37–54)
EOSINOPHIL # BLD AUTO: 0.23 10*3/MM3 (ref 0–0.4)
EOSINOPHIL NFR BLD AUTO: 4.5 % (ref 0.3–6.2)
ERYTHROCYTE [DISTWIDTH] IN BLOOD BY AUTOMATED COUNT: 12.2 % (ref 12.3–15.4)
GFR SERPL CREATININE-BSD FRML MDRD: 99 ML/MIN/1.73
GLUCOSE SERPL-MCNC: 98 MG/DL (ref 65–99)
HCT VFR BLD AUTO: 33.7 % (ref 37.5–51)
HGB BLD-MCNC: 11.9 G/DL (ref 13–17.7)
IMM GRANULOCYTES # BLD AUTO: 0.01 10*3/MM3 (ref 0–0.05)
IMM GRANULOCYTES NFR BLD AUTO: 0.2 % (ref 0–0.5)
LYMPHOCYTES # BLD AUTO: 1.43 10*3/MM3 (ref 0.7–3.1)
LYMPHOCYTES NFR BLD AUTO: 27.7 % (ref 19.6–45.3)
MCH RBC QN AUTO: 31.5 PG (ref 26.6–33)
MCHC RBC AUTO-ENTMCNC: 35.3 G/DL (ref 31.5–35.7)
MCV RBC AUTO: 89.2 FL (ref 79–97)
MONOCYTES # BLD AUTO: 0.62 10*3/MM3 (ref 0.1–0.9)
MONOCYTES NFR BLD AUTO: 12 % (ref 5–12)
NEUTROPHILS NFR BLD AUTO: 2.83 10*3/MM3 (ref 1.7–7)
NEUTROPHILS NFR BLD AUTO: 54.8 % (ref 42.7–76)
NRBC BLD AUTO-RTO: 0 /100 WBC (ref 0–0.2)
PLATELET # BLD AUTO: 147 10*3/MM3 (ref 140–450)
PMV BLD AUTO: 10.9 FL (ref 6–12)
POTASSIUM SERPL-SCNC: 3.5 MMOL/L (ref 3.5–5.2)
RBC # BLD AUTO: 3.78 10*6/MM3 (ref 4.14–5.8)
SARS-COV-2 RNA RESP QL NAA+PROBE: NOT DETECTED
SODIUM SERPL-SCNC: 140 MMOL/L (ref 136–145)
WBC # BLD AUTO: 5.16 10*3/MM3 (ref 3.4–10.8)

## 2020-09-09 PROCEDURE — G0378 HOSPITAL OBSERVATION PER HR: HCPCS

## 2020-09-09 PROCEDURE — 80048 BASIC METABOLIC PNL TOTAL CA: CPT | Performed by: HOSPITALIST

## 2020-09-09 PROCEDURE — 85025 COMPLETE CBC W/AUTO DIFF WBC: CPT | Performed by: HOSPITALIST

## 2020-09-09 RX ORDER — ISOSORBIDE MONONITRATE 30 MG/1
30 TABLET, EXTENDED RELEASE ORAL
Qty: 30 TABLET | Refills: 0 | Status: SHIPPED | OUTPATIENT
Start: 2020-09-09 | End: 2022-08-12

## 2020-09-09 RX ORDER — ISOSORBIDE MONONITRATE 30 MG/1
30 TABLET, EXTENDED RELEASE ORAL
Status: DISCONTINUED | OUTPATIENT
Start: 2020-09-09 | End: 2020-09-09 | Stop reason: HOSPADM

## 2020-09-09 RX ADMIN — TICAGRELOR 90 MG: 90 TABLET ORAL at 09:38

## 2020-09-09 RX ADMIN — TAMSULOSIN HYDROCHLORIDE 0.4 MG: 0.4 CAPSULE ORAL at 09:37

## 2020-09-09 RX ADMIN — ASPIRIN 81 MG: 81 TABLET, CHEWABLE ORAL at 09:37

## 2020-09-09 RX ADMIN — ACETAMINOPHEN 650 MG: 325 TABLET, FILM COATED ORAL at 07:34

## 2020-09-09 RX ADMIN — METOPROLOL SUCCINATE 12.5 MG: 25 TABLET, EXTENDED RELEASE ORAL at 09:37

## 2020-09-09 RX ADMIN — ACETAMINOPHEN 650 MG: 325 TABLET, FILM COATED ORAL at 00:41

## 2020-09-09 RX ADMIN — ZOLPIDEM TARTRATE 5 MG: 5 TABLET ORAL at 03:23

## 2020-09-09 RX ADMIN — PANTOPRAZOLE SODIUM 40 MG: 40 TABLET, DELAYED RELEASE ORAL at 06:03

## 2020-09-09 RX ADMIN — SODIUM CHLORIDE, PRESERVATIVE FREE 10 ML: 5 INJECTION INTRAVENOUS at 09:38

## 2020-09-09 NOTE — DISCHARGE SUMMARY
PHYSICIAN DISCHARGE SUMMARY                                                                        [facility]    Patient Identification:  Name: Karan Graham  Age: 70 y.o.  Sex: male  :  1950  MRN: [id number]  Primary Care Physician: Jerald Gonzalez MD    Admit date: 2020  Discharge date and time:2020  Discharged Condition: good    Discharge Diagnoses:  Active Hospital Problems    Diagnosis  POA   • **Bleeding from the nose [R04.0]  Unknown   • Bleeding nose [R04.0]  Yes   • CAD (coronary artery disease) [I25.10]  Unknown   • Asthma [J45.909]  Unknown   • Sleep apnea [G47.30]  Unknown      Resolved Hospital Problems   No resolved problems to display.          PMHX:   Past Medical History:   Diagnosis Date   • Asthma    • CAD (coronary artery disease)     hx stents 2020   • Pancreas (digestive gland) works poorly    • Skin cancer    • Sleep apnea      PSHX:   Past Surgical History:   Procedure Laterality Date   • BACK SURGERY     • CHOLECYSTECTOMY     • CORONARY ANGIOPLASTY WITH STENT PLACEMENT  2020    Dr Munoz   • EYELID LACERATION REPAIR     • HEMORRHOIDECTOMY     • PANCREAS SURGERY     • SHOULDER SURGERY     • TUMOR REMOVAL      Roof Of Mouth       Hospital Course: Karan Graham  is a 70-year-old white male with history of asthma, coronary artery disease, pancreatic problems, and  sleep apnea who recently been admitted to the hospital in [facility] with bowel obstruction. He had NG in place and his bowel obstruction had resolved and he started having bowel movements and was able to eat. He developed a nosebleed from from the NG tube and required some nasal packing by the hospitalist there. They had no ENT available and patient transferred to [facility] for ENT consult. He recently had cardiac stents placed in  at [facility] with Dr. Munoz. He has been on aspirin and Brilinta. The  patient had bilateral nasal packing and is still having some posterior bleeding. The patient was admitted directly from [facility] to [facility] for further evaluation treatment of his nosebleeds.          The patient was admitted to the hospital and seen by cardiology and ENT.  Cardiology recommended the patient continue with the dual antiplatelet therapy with history of recent stenting.  The patient was seen by ENT and was continued with nasal packing.  ENT wants to see the patient for follow-up in the office on September 10 for further treatment of his nosebleed.  The packing will be removed at that time and further treatment as needed.  Cardiology started the patient on some Imdur and he will follow-up with his cardiologist as scheduled sometime in the next month.  He will also follow-up with primary care.    Consults:     Consults     Date and Time Order Name Status Description    9/8/2020 1351 Inpatient Cardiology Consult Completed     9/8/2020 1351 Inpatient ENT Consult Completed         Results from last 7 days   Lab Units 09/09/20  0549   WBC 10*3/mm3 5.16   HEMOGLOBIN g/dL 11.9*   HEMATOCRIT % 33.7*   PLATELETS 10*3/mm3 147     Results from last 7 days   Lab Units 09/09/20  0549   SODIUM mmol/L 140   POTASSIUM mmol/L 3.5   CHLORIDE mmol/L 104   CO2 mmol/L 25.4   BUN mg/dL 7*   CREATININE mg/dL 0.78   GLUCOSE mg/dL 98   CALCIUM mg/dL 9.0     Significant Diagnostic Studies:   WBC   Date Value Ref Range Status   09/09/2020 5.16 3.40 - 10.80 10*3/mm3 Final     Hemoglobin   Date Value Ref Range Status   09/09/2020 11.9 (L) 13.0 - 17.7 g/dL Final     Hematocrit   Date Value Ref Range Status   09/09/2020 33.7 (L) 37.5 - 51.0 % Final     Platelets   Date Value Ref Range Status   09/09/2020 147 140 - 450 10*3/mm3 Final     Sodium   Date Value Ref Range Status   09/09/2020 140 136 - 145 mmol/L Final     Potassium   Date Value Ref Range Status   09/09/2020 3.5 3.5 - 5.2 mmol/L Final      Chloride   Date Value Ref Range Status   09/09/2020 104 98 - 107 mmol/L Final     CO2   Date Value Ref Range Status   09/09/2020 25.4 22.0 - 29.0 mmol/L Final     BUN   Date Value Ref Range Status   09/09/2020 7 (L) 8 - 23 mg/dL Final     Creatinine   Date Value Ref Range Status   09/09/2020 0.78 0.76 - 1.27 mg/dL Final     Glucose   Date Value Ref Range Status   09/09/2020 98 65 - 99 mg/dL Final     Calcium   Date Value Ref Range Status   09/09/2020 9.0 8.6 - 10.5 mg/dL Final     AST (SGOT)   Date Value Ref Range Status   09/08/2020 18 1 - 40 U/L Final     ALT (SGPT)   Date Value Ref Range Status   09/08/2020 16 1 - 41 U/L Final     Alkaline Phosphatase   Date Value Ref Range Status   09/08/2020 70 39 - 117 U/L Final     INR   Date Value Ref Range Status   09/08/2020 1.07 0.90 - 1.10 Final     No results found for: COLORU, CLARITYU, SPECGRAV, PHUR, PROTEINUR, GLUCOSEU, KETONESU, BLOODU, NITRITE, LEUKOCYTESUR, BILIRUBINUR, UROBILINOGEN, RBCUA, WBCUA, BACTERIA, UACOMMENT  No results found for: TROPONINT, TROPONINI, BNP  No components found for: HGBA1C;2  No components found for: TSH;2  Imaging Results (All)     None        Lab Results (last 7 days)     Procedure Component Value Units Date/Time    Basic Metabolic Panel [[id number]]  (Abnormal) Collected:  09/09/20 0549    Specimen:  Blood Updated:  09/09/20 0713     Glucose 98 mg/dL      BUN 7 mg/dL      Creatinine 0.78 mg/dL      Sodium 140 mmol/L      Potassium 3.5 mmol/L      Chloride 104 mmol/L      CO2 25.4 mmol/L      Calcium 9.0 mg/dL      eGFR Non African Amer 99 mL/min/1.73      BUN/Creatinine Ratio 9.0     Anion Gap 10.6 mmol/L     Narrative:       GFR Normal >60  Chronic Kidney Disease <60  Kidney Failure <15      CBC Auto Differential [[id number]]  (Abnormal) Collected:  09/09/20 0549    Specimen:  Blood Updated:  09/09/20 0647     WBC 5.16 10*3/mm3      RBC 3.78 10*6/mm3      Hemoglobin 11.9 g/dL      Hematocrit 33.7 %      MCV 89.2 fL      MCH 31.5 pg       MCHC 35.3 g/dL      RDW 12.2 %      RDW-SD 39.9 fl      MPV 10.9 fL      Platelets 147 10*3/mm3      Neutrophil % 54.8 %      Lymphocyte % 27.7 %      Monocyte % 12.0 %      Eosinophil % 4.5 %      Basophil % 0.8 %      Immature Grans % 0.2 %      Neutrophils, Absolute 2.83 10*3/mm3      Lymphocytes, Absolute 1.43 10*3/mm3      Monocytes, Absolute 0.62 10*3/mm3      Eosinophils, Absolute 0.23 10*3/mm3      Basophils, Absolute 0.04 10*3/mm3      Immature Grans, Absolute 0.01 10*3/mm3      nRBC 0.0 /100 WBC     Comprehensive Metabolic Panel [[id number]] Collected:  09/08/20 1434    Specimen:  Blood Updated:  09/08/20 1535     Glucose 88 mg/dL      BUN 9 mg/dL      Creatinine 0.81 mg/dL      Sodium 136 mmol/L      Potassium 4.0 mmol/L      Chloride 103 mmol/L      CO2 23.1 mmol/L      Calcium 9.1 mg/dL      Total Protein 6.1 g/dL      Albumin 4.00 g/dL      ALT (SGPT) 16 U/L      AST (SGOT) 18 U/L      Alkaline Phosphatase 70 U/L      Total Bilirubin 0.9 mg/dL      eGFR Non African Amer 95 mL/min/1.73      Globulin 2.1 gm/dL      A/G Ratio 1.9 g/dL      BUN/Creatinine Ratio 11.1     Anion Gap 9.9 mmol/L     Narrative:       GFR Normal >60  Chronic Kidney Disease <60  Kidney Failure <15      aPTT [[id number]]  (Abnormal) Collected:  09/08/20 1434    Specimen:  Blood Updated:  09/08/20 1510     PTT 41.4 seconds     Protime-INR [[id number]]  (Normal) Collected:  09/08/20 1434    Specimen:  Blood Updated:  09/08/20 1510     Protime 13.8 Seconds      INR 1.07    CBC & Differential [[id number]] Collected:  09/08/20 1434    Specimen:  Blood Updated:  09/08/20 1457    Narrative:       The following orders were created for panel order CBC & Differential.  Procedure                               Abnormality         Status                     ---------                               -----------         ------                     CBC Auto Differential[[id number]]        Abnormal            Final result                 Please view  "results for these tests on the individual orders.    CBC Auto Differential [[id number]]  (Abnormal) Collected:  09/08/20 1434    Specimen:  Blood Updated:  09/08/20 1457     WBC 5.17 10*3/mm3      RBC 3.91 10*6/mm3      Hemoglobin 12.0 g/dL      Hematocrit 35.8 %      MCV 91.6 fL      MCH 30.7 pg      MCHC 33.5 g/dL      RDW 12.5 %      RDW-SD 42.1 fl      MPV 10.8 fL      Platelets 146 10*3/mm3      Neutrophil % 58.9 %      Lymphocyte % 25.0 %      Monocyte % 12.8 %      Eosinophil % 2.9 %      Basophil % 0.4 %      Immature Grans % 0.0 %      Neutrophils, Absolute 3.05 10*3/mm3      Lymphocytes, Absolute 1.29 10*3/mm3      Monocytes, Absolute 0.66 10*3/mm3      Eosinophils, Absolute 0.15 10*3/mm3      Basophils, Absolute 0.02 10*3/mm3      Immature Grans, Absolute 0.00 10*3/mm3      nRBC 0.0 /100 WBC     COVID PRE-OP / PRE-PROCEDURE SCREENING ORDER (NO ISOLATION) - Swab, Oropharynx [[id number]] Collected:  09/08/20 1353    Specimen:  Swab from Oropharynx Updated:  09/08/20 1411    Narrative:       The following orders were created for panel order COVID PRE-OP / PRE-PROCEDURE SCREENING ORDER (NO ISOLATION) - Swab, Oropharynx.  Procedure                               Abnormality         Status                     ---------                               -----------         ------                     COVID-19,BIOTAP, NP/OP S...[[id number]]                      In process                   Please view results for these tests on the individual orders.    COVID-19,BIOTAP, NP/OP SWAB IN TRANSPORT MEDIA OR SALINE 24-36 HR TAT - Swab, Oropharynx [[id number]] Collected:  09/08/20 1353    Specimen:  Swab from Oropharynx Updated:  09/08/20 1411        /77 (BP Location: Right arm, Patient Position: Lying)   Pulse 68   Temp 97.7 °F (36.5 °C) (Oral)   Resp 18   Ht 177.8 cm (70\")   Wt 96.2 kg (212 lb)   SpO2 97%   BMI 30.42 kg/m²     Discharge Exam:  General Appearance:    Alert, cooperative, no distress                   "        Head:    Normocephalic, without obvious abnormality, atraumatic                          Eyes:                            Throat:   Lips, tongue, gums normal                          Neck:   Supple, symmetrical, trachea midline, no JVD                        Lungs:     Clear to auscultation bilaterally, respirations unlabored                Chest Wall:    No tenderness or deformity                        Heart:    Regular rate and rhythm, S1 and S2 normal, no murmur,no  Rub                                       or gallop                  Abdomen:     Soft, non-tender, bowel sounds active, no masses, no                                                        organomegaly                  Extremities:   Extremities normal, atraumatic, no cyanosis or edema                             Skin:   Skin is warm and dry,  no rashes or palpable lesions                  Neurologic:   no focal deficits noted     Disposition:  Home    Patient Instructions:      Discharge Medications      New Medications      Instructions Start Date   isosorbide mononitrate 30 MG 24 hr tablet  Commonly known as:  IMDUR   30 mg, Oral, Every 24 Hours Scheduled         Continue These Medications      Instructions Start Date   acetaminophen 650 MG 8 hr tablet  Commonly known as:  TYLENOL   325 mg, Oral, Every 4 Hours PRN      albuterol (2.5 MG/3ML) 0.083% nebulizer solution  Commonly known as:  PROVENTIL   2.5 mg, Nebulization, Every 4 Hours PRN      aspirin 81 MG chewable tablet   81 mg, Oral, Daily      atorvastatin 80 MG tablet  Commonly known as:  LIPITOR   80 mg, Oral, Nightly      benzonatate 100 MG capsule  Commonly known as:  TESSALON   200 mg, Oral, 3 Times Daily PRN      camphor-menthol 0.5-0.5 % lotion  Commonly known as:  SARNA   Topical, As Needed      metoprolol succinate XL 25 MG 24 hr tablet  Commonly known as:  TOPROL-XL   12.5 mg, Oral, Daily      nitroglycerin 0.3 MG SL tablet  Commonly known as:  NITROSTAT   0.4 mg,  Sublingual, Every 5 Minutes PRN, Take no more than 3 doses in 15 minutes.       omeprazole 20 MG capsule  Commonly known as:  priLOSEC   20 mg, Oral, Daily      tamsulosin 0.4 MG capsule 24 hr capsule  Commonly known as:  FLOMAX   1 capsule, Oral, 2 Times Daily      ticagrelor 90 MG tablet tablet  Commonly known as:  BRILINTA   90 mg, Oral, 2 Times Daily      zolpidem 5 MG tablet  Commonly known as:  AMBIEN   Nightly PRN         Stop These Medications    meloxicam 15 MG tablet  Commonly known as:  MOBIC     meloxicam 7.5 MG tablet  Commonly known as:  MOBIC          No future appointments.  Follow-up Information     Tru Em MD Follow up.    Specialty:  Otolaryngology  Why:  on september 10  Contact information:  [address] [facility] [address]  [facility] [address]  [phone]             Jerald Gonzalez MD Follow up.    Specialty:  Internal Medicine  Contact information:  [address]  [facility] [place] [address]  [phone]             Hugo Burt MD Follow up.    Specialty:  Cardiology  Contact information:  [address]  [facility] [address]  [phone]                 Discharge Order (From admission, onward)     Start     Ordered    09/09/20 1004  Discharge patient  Once     Expected Discharge Date:  09/09/20    Discharge Disposition:  Home or Self Care    Physician of Record for Attribution - Please select from Treatment Team:  DAQUAN WU [[id number]]    Review needed by CMO to determine Physician of Record:  No       Question Answer Comment   Physician of Record for Attribution - Please select from Treatment Team DAQUAN WU    Review needed by CMO to determine Physician of Record No        09/09/20 1009                Total time spent discharging patient including evaluation,post hospitalization follow up,  medication and post hospitalization instructions and education total time exceeds 30 minutes.    Signed:  Daquan Wu MD  9/9/2020  10:10

## 2020-09-09 NOTE — PLAN OF CARE
Problem: Patient Care Overview  Goal: Plan of Care Review  Outcome: Ongoing (interventions implemented as appropriate)  Flowsheets (Taken 9/8/2020 1808)  Progress: no change  Plan of Care Reviewed With: patient; spouse  Outcome Summary: Patient a direct admit from [facility] with nasal bleeding. ENT and Cardio consults called. He is tolerating clear liquids. He reports a headache - tylenol given for relief. Patient ambulating around unit. Will continue to monitor.

## 2020-09-09 NOTE — CONSULTS
[facility]   ENT CONSULT  2020      Patient Identification:  Name: Karan Graham  Age: 70 y.o.  Sex: male  :  1950  MRN: [id number]                     Date of Admission: 2020      CC:  Epistaxis      HPI: The patient is a 70-year-old gentleman who had small bowel obstruction last week.  This was treated with bowel rest and nasogastric tube.  He cleared that and when the tube was removed he had epistaxis requiring bilateral anterior nasal packing with sponges.  These have been in place for 2 days and the patient was transferred from [facility] to [facility].  Patient states he broke his nose as a child and since then has had trouble with nasal obstruction fluctuating side to side.  He never had surgery to repair that.  He does not use any nasal sprays or oxygen.  Patient has a history of sleep apnea but does not use CPAP.    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  This was done about 4 or 5 years ago and according to the patient this was a benign tumor.     ROS:  Review of Systems - History obtained from chart review and the patient     Past Medical History:  Past Medical History:   Diagnosis Date   • Asthma    • CAD (coronary artery disease)     hx stents 2020   • Pancreas (digestive gland) works poorly    • Skin cancer    • Sleep apnea        Past Surgical History:  Past Surgical History:   Procedure Laterality Date   • BACK SURGERY     • CHOLECYSTECTOMY     • CORONARY ANGIOPLASTY WITH STENT PLACEMENT  2020    Dr Munoz   • EYELID LACERATION REPAIR     • HEMORRHOIDECTOMY     • PANCREAS SURGERY     • SHOULDER SURGERY     • TUMOR REMOVAL      [place] Of Mouth       Social History:  Social History     Socioeconomic History   • Marital status:      Spouse name: Not on file   • Number of children: Not on file   • Years of education: Not on file   • Highest education  level: Not on file   Tobacco Use   • Smoking status: Former Smoker     Types: Pipe     Last attempt to quit: 1980     Years since quittin.7   • Smokeless tobacco: Never Used   Substance and Sexual Activity   • Alcohol use: Yes     Comment: OCCASIONAL   • Drug use: No   • Sexual activity: Defer       Family History:  Family History   Problem Relation Age of Onset   • No Known Problems Mother    • No Known Problems Father    • No Known Problems Sister    • No Known Problems Brother    • No Known Problems Maternal Aunt    • No Known Problems Maternal Uncle    • No Known Problems Paternal Aunt    • No Known Problems Paternal Uncle    • No Known Problems Maternal Grandmother    • No Known Problems Maternal Grandfather    • No Known Problems Paternal Grandmother    • No Known Problems Paternal Grandfather    • Cancer Other    • Anesthesia problems Neg Hx    • Broken bones Neg Hx    • Clotting disorder Neg Hx    • Collagen disease Neg Hx    • Diabetes Neg Hx    • Dislocations Neg Hx    • Osteoporosis Neg Hx    • Rheumatologic disease Neg Hx    • Scoliosis Neg Hx    • Severe sprains Neg Hx        Home Meds:  Medications Prior to Admission   Medication Sig Dispense Refill Last Dose   • acetaminophen (TYLENOL) 650 MG 8 hr tablet Take 325 mg by mouth Every 4 (Four) Hours As Needed for Mild Pain .   2020 at Unknown time   • albuterol (PROVENTIL) (2.5 MG/3ML) 0.083% nebulizer solution Take 2.5 mg by nebulization Every 4 (Four) Hours As Needed for Wheezing.   Past Month at Unknown time   • aspirin 81 MG chewable tablet Chew 81 mg Daily.   Past Week at Unknown time   • atorvastatin (LIPITOR) 80 MG tablet Take 80 mg by mouth Every Night.   Past Week at Unknown time   • camphor-menthol (SARNA) 0.5-0.5 % lotion Apply  topically to the appropriate area as directed As Needed for Itching.      • metoprolol succinate XL (TOPROL-XL) 25 MG 24 hr tablet Take 12.5 mg by mouth Daily.   Past Week at Unknown time   • nitroglycerin  (NITROSTAT) 0.3 MG SL tablet Place 0.4 mg under the tongue Every 5 (Five) Minutes As Needed for Chest Pain. Take no more than 3 doses in 15 minutes.   Past Month at Unknown time   • omeprazole (priLOSEC) 20 MG capsule Take 20 mg by mouth Daily.   Past Week at Unknown time   • tamsulosin (FLOMAX) 0.4 MG capsule 24 hr capsule Take 1 capsule by mouth 2 (Two) Times a Day.   Past Week at Unknown time   • ticagrelor (BRILINTA) 90 MG tablet tablet Take 90 mg by mouth 2 (Two) Times a Day.   9/7/2020 at Unknown time   • zolpidem (AMBIEN) 5 MG tablet At Night As Needed.   Past Month at Unknown time   • benzonatate (TESSALON) 100 MG capsule Take 200 mg by mouth 3 (Three) Times a Day As Needed for Cough.   Not Taking   • meloxicam (MOBIC) 15 MG tablet TK 1 T PO ONCE D PRN.  3 Taking   • meloxicam (MOBIC) 7.5 MG tablet Take 7.5 mg by mouth Daily.   Not Taking       Allergies:  Allergies   Allergen Reactions   • Codeine    • Ibuprofen Nausea And Vomiting       Immunization Status:    There is no immunization history on file for this patient.      PE:   Temp:  [97.7 °F (36.5 °C)-99.1 °F (37.3 °C)] 97.7 °F (36.5 °C)  Heart Rate:  [65-73] 68  Resp:  [16-18] 18  BP: (130-154)/(74-86) 130/77     Body mass index is 30.42 kg/m².      General appearance: oriented to person, place, and time.  Ability to Communicate: normal means of communication, clear voice, normal hearing  Ears - bilateral TM's and external ear canals normal.  Nasal exam -each nasal cavity has hemostatic sponges in both nostrils.  These protrude about 2 cm out of each side and are secured to the sides of the face with tape.  They appear dry and there is no active bleeding..  Oropharyngeal exam - dental hygiene good and no visible lesions.  Posterior pharynx without thrush.  No active bleeding or blood clot present.  Neck exam - supple, no significant adenopathy.  Neurological exam reveals alert, oriented, normal speech, no focal findings or movement disorder noted,  cranial nerves II through XII intact.      MEDICATIONS     Current Facility-Administered Medications   Medication Dose Route Frequency Provider Last Rate Last Dose   • acetaminophen (TYLENOL) tablet 650 mg  650 mg Oral Q4H PRN Sam Wu MD   650 mg at 09/09/20 0041    Or   • acetaminophen (TYLENOL) 160 MG/5ML solution 650 mg  650 mg Oral Q4H PRN Sam Wu MD        Or   • acetaminophen (TYLENOL) suppository 650 mg  650 mg Rectal Q4H PRN Sam Wu MD       • albuterol (PROVENTIL) nebulizer solution 0.083% 2.5 mg/3mL  2.5 mg Nebulization Q4H PRN Sam Wu MD       • aspirin chewable tablet 81 mg  81 mg Oral Daily Sam Wu MD   81 mg at 09/08/20 2135   • atorvastatin (LIPITOR) tablet 80 mg  80 mg Oral Nightly Sam Wu MD   80 mg at 09/08/20 2135   • metoprolol succinate XL (TOPROL-XL) 24 hr tablet 12.5 mg  12.5 mg Oral Daily Sam Wu MD   12.5 mg at 09/08/20 2135   • nitroglycerin (NITROSTAT) SL tablet 0.4 mg  0.4 mg Sublingual Q5 Min PRN Sam Wu MD       • ondansetron (ZOFRAN) tablet 4 mg  4 mg Oral Q6H PRN Sam Wu MD        Or   • ondansetron (ZOFRAN) injection 4 mg  4 mg Intravenous Q6H PRN Sam Wu MD       • pantoprazole (PROTONIX) EC tablet 40 mg  40 mg Oral QAM Sam Wu MD   40 mg at 09/09/20 0603   • sodium chloride 0.9 % flush 10 mL  10 mL Intravenous Q12H Sam uW MD   10 mL at 09/08/20 2136   • sodium chloride 0.9 % flush 10 mL  10 mL Intravenous PRN Sam Wu MD       • tamsulosin (FLOMAX) 24 hr capsule 0.4 mg  0.4 mg Oral BID Sam Wu MD   0.4 mg at 09/08/20 2135   • ticagrelor (BRILINTA) tablet 90 mg  90 mg Oral BID Sam Wu MD   90 mg at 09/08/20 2135   • zolpidem (AMBIEN) tablet 5 mg  5 mg Oral Nightly PRN Sam Wu MD   5 mg at 09/09/20 0323         DATA         Intake/Output Summary (Last 24 hours) at 9/9/2020 0721  Last data filed at 9/9/2020 0545  Gross per 24 hour   Intake 1200 ml   Output --   Net 1200 ml        CBC:   Results from last 7 days   Lab Units 09/09/20  0549   WBC 10*3/mm3 5.16   RBC 10*6/mm3 3.78*     BMP:   Results from last 7 days   Lab Units 09/09/20  0549   GLUCOSE mg/dL 98   CO2 mmol/L 25.4   BUN mg/dL 7*   CREATININE mg/dL 0.78   CALCIUM mg/dL 9.0     Coagulation:   INR   Date Value Ref Range Status   09/08/2020 1.07 0.90 - 1.10 Final           Imaging Results (All)     None            IMPRESSION     Bleeding from the nose    Bleeding nose    CAD (coronary artery disease)    Asthma    Sleep apnea       Patient had epistaxis after traumatic insertion and removal of nasogastric feeding tube.  Nasal packing has been in place 2 days and there does not appear to be any active bleeding at this time.  From my perspective, he can be discharged today and follow-up in the office tomorrow for packing removal and examination.  I suspect he has septal deviation from previous nasal trauma in childhood.  If he goes home today he should rest with the head elevated.  He should remain indoors and avoid outdoor heat.  He was told not to blow the nose and sneeze with the mouth open.  He should remain on his anticoagulants if required by cardiology.    When the patient is discharged, call the office at [phone] to get an appointment for tomorrow, September 10.        Tru Em MD  9/9/2020  07:21

## 2020-09-09 NOTE — PLAN OF CARE
Problem: Patient Care Overview  Goal: Plan of Care Review  Outcome: Ongoing (interventions implemented as appropriate)  Flowsheets (Taken 9/9/2020 8003)  Progress: no change  Plan of Care Reviewed With: patient  Outcome Summary: Patient was frequently ambulating in the hallway.Tylenol wa given for discomfort. Patient had an episode of anxiety. Ambien was given for sleep. Will continue to monitor vital signs,labs and comfort.

## 2020-09-10 NOTE — PROGRESS NOTES
Case Management Discharge Note      Final Note: Home; no needs identified.  JESE Duong    Provided Post Acute Provider List?: Yes  Post Acute Provider List: Home Health, Nursing Home  Provided Post Acute Provider Quality & Resource List?: N/A  N/A Quality & Resource List Comment: The patient was provided with a HH/SNF list and not a print out of the HH/nursing home compare list from Medicare.gov as he currently denies any d/c needs.  Delivered To: Patient  Method of Delivery: In person    Destination      No service has been selected for the patient.      Durable Medical Equipment      No service has been selected for the patient.      Dialysis/Infusion      No service has been selected for the patient.      Home Medical Care      No service has been selected for the patient.      Therapy      No service has been selected for the patient.      Community Resources      No service has been selected for the patient.        Transportation Services  Private: Car    Final Discharge Disposition Code: 01 - home or self-care

## 2020-09-16 ENCOUNTER — OFFICE (OUTPATIENT)
Dept: URBAN - METROPOLITAN AREA CLINIC 66 | Facility: CLINIC | Age: 70
End: 2020-09-16

## 2020-09-16 VITALS
WEIGHT: 214 LBS | DIASTOLIC BLOOD PRESSURE: 73 MMHG | SYSTOLIC BLOOD PRESSURE: 121 MMHG | HEART RATE: 66 BPM | TEMPERATURE: 97 F | HEIGHT: 70 IN

## 2020-09-16 DIAGNOSIS — K56.609 UNSPECIFIED INTESTINAL OBSTRUCTION, UNSPECIFIED AS TO PARTIA: ICD-10-CM

## 2020-09-16 DIAGNOSIS — R10.9 UNSPECIFIED ABDOMINAL PAIN: ICD-10-CM

## 2020-09-16 DIAGNOSIS — K59.00 CONSTIPATION, UNSPECIFIED: ICD-10-CM

## 2020-09-16 PROCEDURE — 99213 OFFICE O/P EST LOW 20 MIN: CPT | Performed by: NURSE PRACTITIONER

## 2020-10-07 ENCOUNTER — TRANSCRIBE ORDERS (OUTPATIENT)
Dept: CARDIAC REHAB | Facility: HOSPITAL | Age: 70
End: 2020-10-07

## 2020-10-07 DIAGNOSIS — Z95.5 S/P DRUG ELUTING CORONARY STENT PLACEMENT: Primary | ICD-10-CM

## 2020-10-19 ENCOUNTER — OFFICE VISIT (OUTPATIENT)
Dept: CARDIAC REHAB | Facility: HOSPITAL | Age: 70
End: 2020-10-19

## 2020-10-19 ENCOUNTER — TRANSCRIBE ORDERS (OUTPATIENT)
Dept: LAB | Facility: HOSPITAL | Age: 70
End: 2020-10-19

## 2020-10-19 ENCOUNTER — LAB (OUTPATIENT)
Dept: LAB | Facility: HOSPITAL | Age: 70
End: 2020-10-19

## 2020-10-19 ENCOUNTER — APPOINTMENT (OUTPATIENT)
Dept: CARDIAC REHAB | Facility: HOSPITAL | Age: 70
End: 2020-10-19

## 2020-10-19 DIAGNOSIS — R79.9 ABNORMAL FINDING OF BLOOD CHEMISTRY, UNSPECIFIED: ICD-10-CM

## 2020-10-19 DIAGNOSIS — I10 ESSENTIAL HYPERTENSION, MALIGNANT: ICD-10-CM

## 2020-10-19 DIAGNOSIS — I25.10 DISEASE OF CARDIOVASCULAR SYSTEM: ICD-10-CM

## 2020-10-19 DIAGNOSIS — I25.10 DISEASE OF CARDIOVASCULAR SYSTEM: Primary | ICD-10-CM

## 2020-10-19 DIAGNOSIS — Z95.5 S/P DRUG ELUTING CORONARY STENT PLACEMENT: Primary | ICD-10-CM

## 2020-10-19 LAB
CHOLEST SERPL-MCNC: 77 MG/DL (ref 0–200)
HBA1C MFR BLD: 5.4 % (ref 4.8–5.6)
HDLC SERPL-MCNC: 35 MG/DL (ref 40–60)
LDLC SERPL CALC-MCNC: 25 MG/DL (ref 0–100)
LDLC/HDLC SERPL: 0.73 {RATIO}
TRIGL SERPL-MCNC: 83 MG/DL (ref 0–150)
VLDLC SERPL-MCNC: 17 MG/DL (ref 5–40)

## 2020-10-19 PROCEDURE — 80061 LIPID PANEL: CPT

## 2020-10-19 PROCEDURE — 36415 COLL VENOUS BLD VENIPUNCTURE: CPT

## 2020-10-19 PROCEDURE — 93797 PHYS/QHP OP CAR RHAB WO ECG: CPT

## 2020-10-19 PROCEDURE — 83036 HEMOGLOBIN GLYCOSYLATED A1C: CPT

## 2020-10-21 ENCOUNTER — TREATMENT (OUTPATIENT)
Dept: CARDIAC REHAB | Facility: HOSPITAL | Age: 70
End: 2020-10-21

## 2020-10-21 DIAGNOSIS — Z95.5 S/P DRUG ELUTING CORONARY STENT PLACEMENT: Primary | ICD-10-CM

## 2020-10-21 PROCEDURE — 93798 PHYS/QHP OP CAR RHAB W/ECG: CPT

## 2020-10-23 ENCOUNTER — TREATMENT (OUTPATIENT)
Dept: CARDIAC REHAB | Facility: HOSPITAL | Age: 70
End: 2020-10-23

## 2020-10-23 DIAGNOSIS — Z95.5 S/P DRUG ELUTING CORONARY STENT PLACEMENT: Primary | ICD-10-CM

## 2020-10-23 PROCEDURE — 93798 PHYS/QHP OP CAR RHAB W/ECG: CPT

## 2020-10-26 ENCOUNTER — TREATMENT (OUTPATIENT)
Dept: CARDIAC REHAB | Facility: HOSPITAL | Age: 70
End: 2020-10-26

## 2020-10-26 DIAGNOSIS — Z95.5 S/P DRUG ELUTING CORONARY STENT PLACEMENT: Primary | ICD-10-CM

## 2020-10-26 PROCEDURE — 93798 PHYS/QHP OP CAR RHAB W/ECG: CPT

## 2020-11-02 ENCOUNTER — TREATMENT (OUTPATIENT)
Dept: CARDIAC REHAB | Facility: HOSPITAL | Age: 70
End: 2020-11-02

## 2020-11-02 DIAGNOSIS — Z95.5 S/P DRUG ELUTING CORONARY STENT PLACEMENT: Primary | ICD-10-CM

## 2020-11-02 PROCEDURE — 93798 PHYS/QHP OP CAR RHAB W/ECG: CPT

## 2020-11-04 ENCOUNTER — TREATMENT (OUTPATIENT)
Dept: CARDIAC REHAB | Facility: HOSPITAL | Age: 70
End: 2020-11-04

## 2020-11-04 DIAGNOSIS — Z95.5 S/P DRUG ELUTING CORONARY STENT PLACEMENT: Primary | ICD-10-CM

## 2020-11-04 PROCEDURE — 93798 PHYS/QHP OP CAR RHAB W/ECG: CPT

## 2020-11-06 ENCOUNTER — TREATMENT (OUTPATIENT)
Dept: CARDIAC REHAB | Facility: HOSPITAL | Age: 70
End: 2020-11-06

## 2020-11-06 DIAGNOSIS — Z95.5 S/P DRUG ELUTING CORONARY STENT PLACEMENT: Primary | ICD-10-CM

## 2020-11-06 PROCEDURE — 93798 PHYS/QHP OP CAR RHAB W/ECG: CPT

## 2020-11-09 ENCOUNTER — TREATMENT (OUTPATIENT)
Dept: CARDIAC REHAB | Facility: HOSPITAL | Age: 70
End: 2020-11-09

## 2020-11-09 DIAGNOSIS — Z95.5 S/P DRUG ELUTING CORONARY STENT PLACEMENT: Primary | ICD-10-CM

## 2020-11-09 PROCEDURE — 93798 PHYS/QHP OP CAR RHAB W/ECG: CPT

## 2020-11-11 ENCOUNTER — TREATMENT (OUTPATIENT)
Dept: CARDIAC REHAB | Facility: HOSPITAL | Age: 70
End: 2020-11-11

## 2020-11-11 DIAGNOSIS — Z95.5 S/P DRUG ELUTING CORONARY STENT PLACEMENT: Primary | ICD-10-CM

## 2020-11-11 PROCEDURE — 93798 PHYS/QHP OP CAR RHAB W/ECG: CPT

## 2020-11-13 ENCOUNTER — TREATMENT (OUTPATIENT)
Dept: CARDIAC REHAB | Facility: HOSPITAL | Age: 70
End: 2020-11-13

## 2020-11-13 DIAGNOSIS — Z95.5 S/P DRUG ELUTING CORONARY STENT PLACEMENT: Primary | ICD-10-CM

## 2020-11-13 PROCEDURE — 93798 PHYS/QHP OP CAR RHAB W/ECG: CPT

## 2020-11-16 ENCOUNTER — TREATMENT (OUTPATIENT)
Dept: CARDIAC REHAB | Facility: HOSPITAL | Age: 70
End: 2020-11-16

## 2020-11-16 DIAGNOSIS — Z95.5 S/P DRUG ELUTING CORONARY STENT PLACEMENT: Primary | ICD-10-CM

## 2020-11-16 PROCEDURE — 93798 PHYS/QHP OP CAR RHAB W/ECG: CPT

## 2020-11-20 ENCOUNTER — TREATMENT (OUTPATIENT)
Dept: CARDIAC REHAB | Facility: HOSPITAL | Age: 70
End: 2020-11-20

## 2020-11-20 DIAGNOSIS — Z95.5 S/P DRUG ELUTING CORONARY STENT PLACEMENT: Primary | ICD-10-CM

## 2020-11-20 PROCEDURE — 93798 PHYS/QHP OP CAR RHAB W/ECG: CPT

## 2020-11-23 ENCOUNTER — TREATMENT (OUTPATIENT)
Dept: CARDIAC REHAB | Facility: HOSPITAL | Age: 70
End: 2020-11-23

## 2020-11-23 DIAGNOSIS — Z95.5 S/P DRUG ELUTING CORONARY STENT PLACEMENT: Primary | ICD-10-CM

## 2020-11-23 PROCEDURE — 93798 PHYS/QHP OP CAR RHAB W/ECG: CPT

## 2020-11-25 ENCOUNTER — TREATMENT (OUTPATIENT)
Dept: CARDIAC REHAB | Facility: HOSPITAL | Age: 70
End: 2020-11-25

## 2020-11-25 DIAGNOSIS — Z95.5 S/P DRUG ELUTING CORONARY STENT PLACEMENT: Primary | ICD-10-CM

## 2020-11-25 PROCEDURE — 93798 PHYS/QHP OP CAR RHAB W/ECG: CPT

## 2020-11-30 ENCOUNTER — TREATMENT (OUTPATIENT)
Dept: CARDIAC REHAB | Facility: HOSPITAL | Age: 70
End: 2020-11-30

## 2020-11-30 DIAGNOSIS — Z95.5 S/P DRUG ELUTING CORONARY STENT PLACEMENT: Primary | ICD-10-CM

## 2020-11-30 PROCEDURE — 93798 PHYS/QHP OP CAR RHAB W/ECG: CPT

## 2020-12-04 ENCOUNTER — TREATMENT (OUTPATIENT)
Dept: CARDIAC REHAB | Facility: HOSPITAL | Age: 70
End: 2020-12-04

## 2020-12-04 DIAGNOSIS — Z95.5 S/P DRUG ELUTING CORONARY STENT PLACEMENT: Primary | ICD-10-CM

## 2020-12-04 PROCEDURE — 93798 PHYS/QHP OP CAR RHAB W/ECG: CPT

## 2020-12-07 ENCOUNTER — TREATMENT (OUTPATIENT)
Dept: CARDIAC REHAB | Facility: HOSPITAL | Age: 70
End: 2020-12-07

## 2020-12-07 DIAGNOSIS — Z95.5 S/P DRUG ELUTING CORONARY STENT PLACEMENT: Primary | ICD-10-CM

## 2020-12-07 PROCEDURE — 93798 PHYS/QHP OP CAR RHAB W/ECG: CPT

## 2020-12-09 ENCOUNTER — TREATMENT (OUTPATIENT)
Dept: CARDIAC REHAB | Facility: HOSPITAL | Age: 70
End: 2020-12-09

## 2020-12-09 DIAGNOSIS — Z95.5 S/P DRUG ELUTING CORONARY STENT PLACEMENT: Primary | ICD-10-CM

## 2020-12-09 PROCEDURE — 93798 PHYS/QHP OP CAR RHAB W/ECG: CPT

## 2020-12-11 ENCOUNTER — TREATMENT (OUTPATIENT)
Dept: CARDIAC REHAB | Facility: HOSPITAL | Age: 70
End: 2020-12-11

## 2020-12-11 DIAGNOSIS — Z95.5 S/P DRUG ELUTING CORONARY STENT PLACEMENT: Primary | ICD-10-CM

## 2020-12-11 PROCEDURE — 93798 PHYS/QHP OP CAR RHAB W/ECG: CPT

## 2020-12-14 ENCOUNTER — TREATMENT (OUTPATIENT)
Dept: CARDIAC REHAB | Facility: HOSPITAL | Age: 70
End: 2020-12-14

## 2020-12-14 DIAGNOSIS — Z95.5 S/P DRUG ELUTING CORONARY STENT PLACEMENT: Primary | ICD-10-CM

## 2020-12-14 PROCEDURE — 93798 PHYS/QHP OP CAR RHAB W/ECG: CPT

## 2020-12-18 ENCOUNTER — APPOINTMENT (OUTPATIENT)
Dept: CARDIAC REHAB | Facility: HOSPITAL | Age: 70
End: 2020-12-18

## 2020-12-21 ENCOUNTER — APPOINTMENT (OUTPATIENT)
Dept: CARDIAC REHAB | Facility: HOSPITAL | Age: 70
End: 2020-12-21

## 2020-12-23 ENCOUNTER — APPOINTMENT (OUTPATIENT)
Dept: CARDIAC REHAB | Facility: HOSPITAL | Age: 70
End: 2020-12-23

## 2020-12-28 ENCOUNTER — APPOINTMENT (OUTPATIENT)
Dept: CARDIAC REHAB | Facility: HOSPITAL | Age: 70
End: 2020-12-28

## 2020-12-30 ENCOUNTER — APPOINTMENT (OUTPATIENT)
Dept: CARDIAC REHAB | Facility: HOSPITAL | Age: 70
End: 2020-12-30

## 2021-01-04 ENCOUNTER — APPOINTMENT (OUTPATIENT)
Dept: CARDIAC REHAB | Facility: HOSPITAL | Age: 71
End: 2021-01-04

## 2021-01-06 ENCOUNTER — APPOINTMENT (OUTPATIENT)
Dept: CARDIAC REHAB | Facility: HOSPITAL | Age: 71
End: 2021-01-06

## 2021-01-08 ENCOUNTER — APPOINTMENT (OUTPATIENT)
Dept: CARDIAC REHAB | Facility: HOSPITAL | Age: 71
End: 2021-01-08

## 2021-01-11 ENCOUNTER — APPOINTMENT (OUTPATIENT)
Dept: CARDIAC REHAB | Facility: HOSPITAL | Age: 71
End: 2021-01-11

## 2021-01-13 ENCOUNTER — APPOINTMENT (OUTPATIENT)
Dept: CARDIAC REHAB | Facility: HOSPITAL | Age: 71
End: 2021-01-13

## 2021-01-15 ENCOUNTER — APPOINTMENT (OUTPATIENT)
Dept: CARDIAC REHAB | Facility: HOSPITAL | Age: 71
End: 2021-01-15

## 2021-01-18 ENCOUNTER — APPOINTMENT (OUTPATIENT)
Dept: CARDIAC REHAB | Facility: HOSPITAL | Age: 71
End: 2021-01-18

## 2021-01-20 ENCOUNTER — APPOINTMENT (OUTPATIENT)
Dept: CARDIAC REHAB | Facility: HOSPITAL | Age: 71
End: 2021-01-20

## 2021-02-13 ENCOUNTER — HOSPITAL ENCOUNTER (EMERGENCY)
Dept: HOSPITAL 49 - FER | Age: 71
Discharge: HOME | End: 2021-02-13
Payer: MEDICARE

## 2021-02-13 DIAGNOSIS — Z90.49: ICD-10-CM

## 2021-02-13 DIAGNOSIS — Z88.5: ICD-10-CM

## 2021-02-13 DIAGNOSIS — K21.9: ICD-10-CM

## 2021-02-13 DIAGNOSIS — Z95.5: ICD-10-CM

## 2021-02-13 DIAGNOSIS — T14.8XXA: Primary | ICD-10-CM

## 2021-02-13 DIAGNOSIS — M54.5: ICD-10-CM

## 2021-02-13 DIAGNOSIS — I51.9: ICD-10-CM

## 2021-02-13 DIAGNOSIS — N13.30: ICD-10-CM
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
## 2021-02-13 LAB
ALBUMIN SERPL-MCNC: 4 G/DL (ref 3.4–5)
ALKALINE PHOSHATASE: 95 U/L (ref 46–116)
ALT SERPL-CCNC: 34 U/L (ref 16–63)
AST: 23 U/L (ref 15–37)
BASOPHIL: 0.9 % (ref 0–2)
BILIRUBIN - TOTAL: 1.1 MG/DL (ref 0.2–1)
BILIRUBIN: NEGATIVE MG/DL
BLOOD: NEGATIVE ERY/UL
BUN SERPL-MCNC: 15 MG/DL (ref 7–18)
BUN/CREAT RATIO (CALC): 15.2 RATIO
CHLORIDE: 102 MMOL/L (ref 98–107)
CLARITY UR: CLEAR
CO2 (BICARBONATE): 28 MMOL/L (ref 21–32)
COLOR: YELLOW
CREATININE: 0.99 MG/DL (ref 0.67–1.17)
EOSINOPHIL: 4.8 % (ref 0–7)
GLOBULIN (CALCULATION): 3 G/DL
GLUCOSE (U): NORMAL MG/DL
GLUCOSE SERPL-MCNC: 102 MG/DL (ref 74–106)
HCT: 42.8 % (ref 42–52)
HGB BLD-MCNC: 14.3 G/DL (ref 13.2–18)
LEUKOCYTES: NEGATIVE LEU/UL
LYMPHOCYTE: 30.6 % (ref 15–48)
MCH RBC QN AUTO: 30.4 PG (ref 25–31)
MCHC RBC AUTO-ENTMCNC: 33.4 G/DL (ref 32–36)
MCV: 90.9 FL (ref 78–100)
MONOCYTE: 11.9 % (ref 0–12)
MPV: 11.2 FL (ref 6–9.5)
NEUTROPHIL: 51.6 % (ref 41–80)
NITRITE: NEGATIVE MG/DL
NRBC: 0
PLT: 152 K/UL (ref 150–400)
POTASSIUM: 4.6 MMOL/L (ref 3.5–5.1)
PROTEIN: NEGATIVE MG/DL
RBC MORPHOLOGY: NORMAL
RBC: 4.71 M/UL (ref 4.7–6)
RDW: 12.6 % (ref 11.5–14)
SPECIFIC GRAVITY: 1.01 (ref 1–1.03)
TOTAL PROTEIN: 7 G/DL (ref 6.4–8.2)
UROBILINOGEN: 0.2 MG/DL (ref 0.2–1)
WBC: 5.5 K/UL (ref 4–10.5)

## 2021-05-05 ENCOUNTER — OFFICE (OUTPATIENT)
Dept: URBAN - METROPOLITAN AREA CLINIC 64 | Facility: CLINIC | Age: 71
End: 2021-05-05

## 2021-05-05 VITALS
HEIGHT: 70 IN | HEART RATE: 76 BPM | WEIGHT: 221 LBS | DIASTOLIC BLOOD PRESSURE: 69 MMHG | SYSTOLIC BLOOD PRESSURE: 127 MMHG

## 2021-05-05 DIAGNOSIS — K59.00 CONSTIPATION, UNSPECIFIED: ICD-10-CM

## 2021-05-05 DIAGNOSIS — R10.9 UNSPECIFIED ABDOMINAL PAIN: ICD-10-CM

## 2021-05-05 DIAGNOSIS — K56.60 UNSPECIFIED INTESTINAL OBSTRUCTION: ICD-10-CM

## 2021-05-05 PROCEDURE — 99214 OFFICE O/P EST MOD 30 MIN: CPT | Performed by: INTERNAL MEDICINE

## 2021-11-03 ENCOUNTER — HOSPITAL ENCOUNTER (EMERGENCY)
Dept: HOSPITAL 49 - FER | Age: 71
LOS: 1 days | Discharge: HOME | End: 2021-11-04
Payer: MEDICARE
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
## 2021-11-03 DIAGNOSIS — Z88.6: ICD-10-CM

## 2021-11-03 DIAGNOSIS — S60.221A: Primary | ICD-10-CM

## 2021-11-03 DIAGNOSIS — J45.909: ICD-10-CM

## 2021-11-03 DIAGNOSIS — I25.2: ICD-10-CM

## 2021-11-03 DIAGNOSIS — Z88.5: ICD-10-CM

## 2022-05-15 ENCOUNTER — HOSPITAL ENCOUNTER (EMERGENCY)
Dept: HOSPITAL 49 - FER | Age: 72
Discharge: HOME | End: 2022-05-15
Payer: MEDICARE

## 2022-05-15 DIAGNOSIS — Z95.5: ICD-10-CM

## 2022-05-15 DIAGNOSIS — J32.0: Primary | ICD-10-CM

## 2022-05-15 DIAGNOSIS — J32.3: ICD-10-CM

## 2022-05-15 DIAGNOSIS — Z88.5: ICD-10-CM

## 2022-05-15 DIAGNOSIS — I25.10: ICD-10-CM

## 2022-05-15 DIAGNOSIS — J32.2: ICD-10-CM

## 2022-05-15 LAB
ALBUMIN SERPL-MCNC: 3.9 G/DL (ref 3.4–5)
ALKALINE PHOSHATASE: 95 U/L (ref 46–116)
ALT SERPL-CCNC: 30 U/L (ref 16–63)
AST: 23 U/L (ref 15–37)
BASOPHIL: 0.6 % (ref 0–2)
BILIRUBIN - TOTAL: 1 MG/DL (ref 0.2–1)
BUN SERPL-MCNC: 9 MG/DL (ref 7–18)
BUN/CREAT RATIO (CALC): 10.8 RATIO
CHLORIDE: 101 MMOL/L (ref 98–107)
CO2 (BICARBONATE): 24 MMOL/L (ref 21–32)
CREATININE: 0.83 MG/DL (ref 0.67–1.17)
EOSINOPHIL: 1.8 % (ref 0–7)
GLOBULIN (CALCULATION): 2.8 G/DL
GLUCOSE SERPL-MCNC: 112 MG/DL (ref 74–106)
HCT: 37.9 % (ref 42–52)
HGB BLD-MCNC: 13.1 G/DL (ref 13.2–18)
LACTIC ACID: 1.1 MMOL/L (ref 0.4–1.9)
LYMPHOCYTE: 14.1 % (ref 15–48)
MCH RBC QN AUTO: 31.2 PG (ref 25–31)
MCHC RBC AUTO-ENTMCNC: 34.6 G/DL (ref 32–36)
MCV: 90.2 FL (ref 78–100)
MONOCYTE: 9.9 % (ref 0–12)
MPV: 10.6 FL (ref 6–9.5)
NEUTROPHIL: 73.4 % (ref 41–80)
NRBC: 0
PLT: 163 K/UL (ref 150–400)
POTASSIUM: 3.8 MMOL/L (ref 3.5–5.1)
RBC MORPHOLOGY: NORMAL
RBC: 4.2 M/UL (ref 4.7–6)
RDW: 12.7 % (ref 11.5–14)
TOTAL PROTEIN: 6.7 G/DL (ref 6.4–8.2)
WBC: 8.9 K/UL (ref 4–10.5)

## 2022-08-12 ENCOUNTER — LAB (OUTPATIENT)
Dept: LAB | Facility: HOSPITAL | Age: 72
End: 2022-08-12

## 2022-08-12 ENCOUNTER — OFFICE VISIT (OUTPATIENT)
Dept: FAMILY MEDICINE CLINIC | Age: 72
End: 2022-08-12

## 2022-08-12 VITALS
HEIGHT: 70 IN | HEART RATE: 70 BPM | SYSTOLIC BLOOD PRESSURE: 139 MMHG | BODY MASS INDEX: 32.27 KG/M2 | DIASTOLIC BLOOD PRESSURE: 67 MMHG | WEIGHT: 225.4 LBS

## 2022-08-12 DIAGNOSIS — E78.00 HIGH CHOLESTEROL: ICD-10-CM

## 2022-08-12 DIAGNOSIS — R06.09 DYSPNEA ON EXERTION: ICD-10-CM

## 2022-08-12 DIAGNOSIS — Z12.5 SCREENING FOR PROSTATE CANCER: ICD-10-CM

## 2022-08-12 DIAGNOSIS — I25.10 CORONARY ARTERY DISEASE INVOLVING NATIVE CORONARY ARTERY OF NATIVE HEART, UNSPECIFIED WHETHER ANGINA PRESENT: Primary | ICD-10-CM

## 2022-08-12 DIAGNOSIS — J45.20 MILD INTERMITTENT ASTHMA, UNCOMPLICATED: ICD-10-CM

## 2022-08-12 DIAGNOSIS — I10 ESSENTIAL HYPERTENSION: ICD-10-CM

## 2022-08-12 DIAGNOSIS — K21.9 GERD WITHOUT ESOPHAGITIS: ICD-10-CM

## 2022-08-12 PROBLEM — R04.0 BLEEDING NOSE: Status: RESOLVED | Noted: 2020-09-08 | Resolved: 2022-08-12

## 2022-08-12 PROBLEM — N40.0 BENIGN PROSTATIC HYPERPLASIA: Status: ACTIVE | Noted: 2017-07-24

## 2022-08-12 PROBLEM — R04.0 BLEEDING FROM THE NOSE: Status: RESOLVED | Noted: 2020-09-08 | Resolved: 2022-08-12

## 2022-08-12 PROBLEM — N40.1 BPH WITH OBSTRUCTION/LOWER URINARY TRACT SYMPTOMS: Status: ACTIVE | Noted: 2017-07-24

## 2022-08-12 PROBLEM — N13.8 BPH WITH OBSTRUCTION/LOWER URINARY TRACT SYMPTOMS: Status: ACTIVE | Noted: 2017-07-24

## 2022-08-12 PROBLEM — J10.1 INFLUENZA A (H1N1): Status: RESOLVED | Noted: 2017-03-22 | Resolved: 2022-08-12

## 2022-08-12 PROBLEM — G47.00 INSOMNIA: Status: ACTIVE | Noted: 2018-10-01

## 2022-08-12 PROBLEM — G47.00 INSOMNIA: Status: RESOLVED | Noted: 2018-10-01 | Resolved: 2022-08-12

## 2022-08-12 LAB
CHOLEST SERPL-MCNC: 95 MG/DL (ref 0–200)
DEPRECATED RDW RBC AUTO: 44.4 FL (ref 37–54)
ERYTHROCYTE [DISTWIDTH] IN BLOOD BY AUTOMATED COUNT: 13 % (ref 12.3–15.4)
HCT VFR BLD AUTO: 41.6 % (ref 37.5–51)
HDLC SERPL-MCNC: 31 MG/DL (ref 40–60)
HGB BLD-MCNC: 13.4 G/DL (ref 13–17.7)
LDLC SERPL CALC-MCNC: 43 MG/DL (ref 0–100)
LDLC/HDLC SERPL: 1.35 {RATIO}
MCH RBC QN AUTO: 30.1 PG (ref 26.6–33)
MCHC RBC AUTO-ENTMCNC: 32.2 G/DL (ref 31.5–35.7)
MCV RBC AUTO: 93.5 FL (ref 79–97)
PLATELET # BLD AUTO: 154 10*3/MM3 (ref 140–450)
PMV BLD AUTO: 10.5 FL (ref 6–12)
PSA SERPL-MCNC: 1.14 NG/ML (ref 0–4)
RBC # BLD AUTO: 4.45 10*6/MM3 (ref 4.14–5.8)
TRIGL SERPL-MCNC: 111 MG/DL (ref 0–150)
TSH SERPL DL<=0.05 MIU/L-ACNC: 1.19 UIU/ML (ref 0.27–4.2)
VLDLC SERPL-MCNC: 21 MG/DL (ref 5–40)
WBC NRBC COR # BLD: 6.22 10*3/MM3 (ref 3.4–10.8)

## 2022-08-12 PROCEDURE — G0103 PSA SCREENING: HCPCS | Performed by: FAMILY MEDICINE

## 2022-08-12 PROCEDURE — 36415 COLL VENOUS BLD VENIPUNCTURE: CPT | Performed by: FAMILY MEDICINE

## 2022-08-12 PROCEDURE — 85027 COMPLETE CBC AUTOMATED: CPT | Performed by: FAMILY MEDICINE

## 2022-08-12 PROCEDURE — 99204 OFFICE O/P NEW MOD 45 MIN: CPT | Performed by: FAMILY MEDICINE

## 2022-08-12 PROCEDURE — 80061 LIPID PANEL: CPT | Performed by: FAMILY MEDICINE

## 2022-08-12 PROCEDURE — 84443 ASSAY THYROID STIM HORMONE: CPT | Performed by: FAMILY MEDICINE

## 2022-08-12 RX ORDER — NITROGLYCERIN 0.4 MG/1
0.4 TABLET SUBLINGUAL
COMMUNITY

## 2022-08-12 RX ORDER — ALBUTEROL SULFATE 90 UG/1
AEROSOL, METERED RESPIRATORY (INHALATION)
COMMUNITY
Start: 2022-05-15

## 2022-08-12 RX ORDER — CLOPIDOGREL BISULFATE 75 MG/1
1 TABLET ORAL DAILY
COMMUNITY
Start: 2021-03-01

## 2022-08-12 NOTE — ASSESSMENT & PLAN NOTE
1Lipid abnormalities are unchanged.  Nutritional counseling was provided.  Lipids will be reassessed in 3 months   UNKNOWN LEVEL OF CONTROL, WILL CHECK LABS AND PROCEED ACCORDINGLY .

## 2022-08-12 NOTE — ASSESSMENT & PLAN NOTE
Asthma is improving with treatment.  The patient is experiencing no daytime asthma symptoms. He is experiencing no nighttime asthma symptoms.  Discussed monitoring symptoms and use of quick-relief medications and contacting us early in the course of exacerbations.

## 2022-08-12 NOTE — PROGRESS NOTES
Chief Complaint  Establish Care and Asthma    Subjective          Karan Grahma presents to [facility]  NEW PATIENT:  --TOLERATING BLOOD PRESSURE MEDICATION WITHOUT APPARENT SIDE EFFECTS  --NO ISSUES WITH STATIN, NO RECENT LIPID PANEL  --GERD IS DOING WELL WITH THE PPI  --HISTORY OF ASTHMA BUT ONLY OCCASIONALLY NEEDS THE INHALER  --HISTORY OF CAD S/P STENT PLACEMENT, WILL SEE A NEW CARDIOLOGIST IN THE NEXT FEW DAYS.  HAS RECENTLY HAD DYSPNEA ON MODERATE EXERTION WHICH IS DIFFERENT THAT HIS ASTHMA SYMPTOMS AND NOT HELPED BY HIS INHALER.  THIS IS SIMILAR TO WHAT HE WAS HAVING BEFORE HIS STENT PLACEMENT.    --DUE FOR SOME ROUTINE LABS         Allergies   Allergen Reactions   • Codeine    • Ibuprofen Nausea And Vomiting        Health Maintenance Due   Topic Date Due   • COLORECTAL CANCER SCREENING  Never done   • HEPATITIS C SCREENING  Never done   • ANNUAL WELLNESS VISIT  Never done   • COVID-19 Vaccine (4 - Booster for Pfizer series) 02/06/2022   • LIPID PANEL  08/12/2022        Current Outpatient Medications on File Prior to Visit   Medication Sig   • albuterol sulfate  (90 Base) MCG/ACT inhaler INHALE 2 PUFFS EVERY 6 HOURS AS NEEDED FOR SHORTNESS OF BREATH   • aspirin 81 MG chewable tablet Chew 81 mg Daily.   • atorvastatin (LIPITOR) 80 MG tablet Take 80 mg by mouth Every Night.   • clopidogrel (PLAVIX) 75 MG tablet Take 1 tablet by mouth Daily.   • metoprolol succinate XL (TOPROL-XL) 25 MG 24 hr tablet Take 12.5 mg by mouth Daily.   • nitroglycerin (NITROSTAT) 0.4 MG SL tablet Place 0.4 mg under the tongue Every 5 (Five) Minutes As Needed for Chest Pain. Take no more than 3 doses in 15 minutes.   • omeprazole (priLOSEC) 20 MG capsule Take 20 mg by mouth Daily.   • tamsulosin (FLOMAX) 0.4 MG capsule 24 hr capsule Take 1 capsule by mouth 2 (Two) Times a Day.   • [DISCONTINUED] nitroglycerin (NITROSTAT) 0.3 MG SL tablet Place 0.4 mg under the tongue Every 5 (Five) Minutes As  "Needed for Chest Pain. Take no more than 3 doses in 15 minutes.   • [DISCONTINUED] acetaminophen (TYLENOL) 650 MG 8 hr tablet Take 325 mg by mouth Every 4 (Four) Hours As Needed for Mild Pain .   • [DISCONTINUED] albuterol (PROVENTIL) (2.5 MG/3ML) 0.083% nebulizer solution Take 2.5 mg by nebulization Every 4 (Four) Hours As Needed for Wheezing.   • [DISCONTINUED] benzonatate (TESSALON) 100 MG capsule Take 200 mg by mouth 3 (Three) Times a Day As Needed for Cough.   • [DISCONTINUED] camphor-menthol (SARNA) 0.5-0.5 % lotion Apply  topically to the appropriate area as directed As Needed for Itching.   • [DISCONTINUED] isosorbide mononitrate (IMDUR) 30 MG 24 hr tablet Take 1 tablet by mouth Daily for 30 days.   • [DISCONTINUED] ticagrelor (BRILINTA) 90 MG tablet tablet Take 90 mg by mouth 2 (Two) Times a Day.   • [DISCONTINUED] zolpidem (AMBIEN) 5 MG tablet At Night As Needed.     No current facility-administered medications on file prior to visit.       Immunization History   Administered Date(s) Administered   • COVID-19 (PFIZER) PURPLE CAP 02/24/2021, 03/17/2021, 10/06/2021   • Pneumococcal Conjugate 13-Valent (PCV13) 08/08/2016   • Pneumococcal Polysaccharide (PPSV23) 10/01/2018       Review of Systems   Constitutional: Positive for fatigue. Negative for activity change, appetite change, chills and fever.   HENT: Negative for congestion, ear pain, rhinorrhea and sore throat.    Respiratory: Negative for cough.    Cardiovascular: Negative for chest pain, palpitations and leg swelling.   Gastrointestinal: Negative for abdominal pain, constipation, diarrhea, nausea and vomiting.   Musculoskeletal: Negative for arthralgias and myalgias.   Neurological: Negative for headache.        Objective     /67 (BP Location: Right arm, Patient Position: Sitting)   Pulse 70   Ht 177.8 cm (70\")   Wt 102 kg (225 lb 6.4 oz)   BMI 32.34 kg/m²       Physical Exam  Vitals and nursing note reviewed.   Constitutional:       " General: He is not in acute distress.     Appearance: Normal appearance.   Cardiovascular:      Rate and Rhythm: Normal rate and regular rhythm.      Heart sounds: Normal heart sounds. No murmur heard.  Pulmonary:      Effort: Pulmonary effort is normal.      Breath sounds: Normal breath sounds.   Abdominal:      Palpations: Abdomen is soft.      Tenderness: There is no abdominal tenderness.   Musculoskeletal:      Cervical back: Neck supple.      Right lower leg: No edema.      Left lower leg: No edema.   Lymphadenopathy:      Cervical: No cervical adenopathy.   Neurological:      General: No focal deficit present.      Mental Status: He is alert.      Cranial Nerves: No cranial nerve deficit.      Coordination: Coordination normal.      Gait: Gait normal.   Psychiatric:         Mood and Affect: Mood normal.         Behavior: Behavior normal.         Result Review :                             Assessment and Plan      Diagnoses and all orders for this visit:    1. Coronary artery disease involving native coronary artery of native heart, unspecified whether angina present (Primary)  Assessment & Plan:  POSSIBLE ANGINAL VARIANT, SEE CARDIOLOGY AS PLANNED       2. High cholesterol  Assessment & Plan:  1Lipid abnormalities are unchanged.  Nutritional counseling was provided.  Lipids will be reassessed in 3 months   UNKNOWN LEVEL OF CONTROL, WILL CHECK LABS AND PROCEED ACCORDINGLY .    Orders:  -     Lipid Panel    3. GERD without esophagitis  Assessment & Plan:  IMPROVED WITH CURRENT TREATMENT, CONTINUE SAME, WILL REEVALUATE AT NEXT VISIT       4. Essential hypertension  Assessment & Plan:  Hypertension is improving with treatment.  Continue current treatment regimen.  Continue current medications.  Blood pressure will be reassessed in 3 months.    Orders:  -     CBC (No Diff)  -     Comprehensive Metabolic Panel  -     TSH    5. Screening for prostate cancer  -     PSA Screen    6. Dyspnea on  exertion  Comments:  POSSIBLE MANIFESTATION OF HIS CAD BUT WILL GET A PULMONARY EVAL AS WELL   Orders:  -     Ambulatory Referral to Pulmonology    7. Mild intermittent asthma, uncomplicated  Assessment & Plan:  Asthma is improving with treatment.  The patient is experiencing no daytime asthma symptoms. He is experiencing no nighttime asthma symptoms.  Discussed monitoring symptoms and use of quick-relief medications and contacting us early in the course of exacerbations.                Follow Up     Return in about 3 months (around 11/12/2022).    Patient was given instructions and counseling regarding his condition or for health maintenance advice. Please see specific information pulled into the AVS if appropriate.

## 2022-08-14 ENCOUNTER — HOSPITAL ENCOUNTER (EMERGENCY)
Dept: HOSPITAL 49 - FER | Age: 72
Discharge: HOME | End: 2022-08-14
Payer: MEDICARE

## 2022-08-14 DIAGNOSIS — Z88.5: ICD-10-CM

## 2022-08-14 DIAGNOSIS — K21.9: ICD-10-CM

## 2022-08-14 DIAGNOSIS — E78.5: ICD-10-CM

## 2022-08-14 DIAGNOSIS — Z79.899: ICD-10-CM

## 2022-08-14 DIAGNOSIS — Z79.82: ICD-10-CM

## 2022-08-14 DIAGNOSIS — I25.2: ICD-10-CM

## 2022-08-14 DIAGNOSIS — Z79.02: ICD-10-CM

## 2022-08-14 DIAGNOSIS — U07.1: Primary | ICD-10-CM

## 2022-08-14 LAB
ALBUMIN SERPL-MCNC: 4.3 G/DL (ref 3.4–5)
ALKALINE PHOSHATASE: 79 U/L (ref 46–116)
ALT SERPL-CCNC: 40 U/L (ref 16–63)
AST: 31 U/L (ref 15–37)
BASOPHIL: 0.7 % (ref 0–2)
BILIRUBIN - TOTAL: 1 MG/DL (ref 0.2–1)
BILIRUBIN: NEGATIVE MG/DL
BLOOD: NEGATIVE ERY/UL
BUN SERPL-MCNC: 11 MG/DL (ref 7–18)
BUN/CREAT RATIO (CALC): 10.8 RATIO
CHLORIDE: 102 MMOL/L (ref 98–107)
CLARITY UR: CLEAR
CO2 (BICARBONATE): 28 MMOL/L (ref 21–32)
COLOR: YELLOW
CREATININE: 1.02 MG/DL (ref 0.67–1.17)
EOSINOPHIL: 1.1 % (ref 0–7)
GLOBULIN (CALCULATION): 2.7 G/DL
GLUCOSE (U): NORMAL MG/DL
GLUCOSE SERPL-MCNC: 99 MG/DL (ref 74–106)
HCT: 43.3 % (ref 42–52)
HGB BLD-MCNC: 14.6 G/DL (ref 13.2–18)
LEUKOCYTES: NEGATIVE LEU/UL
LYMPHOCYTE: 17.9 % (ref 15–48)
MCH RBC QN AUTO: 30.8 PG (ref 25–31)
MCHC RBC AUTO-ENTMCNC: 33.7 G/DL (ref 32–36)
MCV: 91.4 FL (ref 78–100)
MONOCYTE: 19.2 % (ref 0–12)
MPV: 10.4 FL (ref 6–9.5)
NEUTROPHIL: 60.7 % (ref 41–80)
NITRITE: NEGATIVE MG/DL
NRBC: 0
PLT: 148 K/UL (ref 150–400)
POTASSIUM: 4.1 MMOL/L (ref 3.5–5.1)
PROTEIN: NEGATIVE MG/DL
RBC MORPHOLOGY: NORMAL
RBC: 4.74 M/UL (ref 4.7–6)
RDW: 13.2 % (ref 11.5–14)
SPECIFIC GRAVITY: <=1.005 (ref 1–1.03)
TOTAL PROTEIN: 7 G/DL (ref 6.4–8.2)
UROBILINOGEN: 0.2 MG/DL (ref 0.2–1)
WBC: 5.5 K/UL (ref 4–10.5)

## 2022-08-22 ENCOUNTER — OFFICE VISIT (OUTPATIENT)
Dept: FAMILY MEDICINE CLINIC | Age: 72
End: 2022-08-22

## 2022-08-22 ENCOUNTER — HOSPITAL ENCOUNTER (OUTPATIENT)
Dept: GENERAL RADIOLOGY | Facility: HOSPITAL | Age: 72
Discharge: HOME | End: 2022-08-22
Admitting: PHYSICIAN ASSISTANT

## 2022-08-22 VITALS
TEMPERATURE: 99 F | OXYGEN SATURATION: 97 % | WEIGHT: 221.2 LBS | BODY MASS INDEX: 31.67 KG/M2 | HEART RATE: 74 BPM | HEIGHT: 70 IN | SYSTOLIC BLOOD PRESSURE: 132 MMHG | DIASTOLIC BLOOD PRESSURE: 54 MMHG

## 2022-08-22 DIAGNOSIS — U07.1 COVID-19: ICD-10-CM

## 2022-08-22 DIAGNOSIS — U07.1 COVID-19: Primary | ICD-10-CM

## 2022-08-22 DIAGNOSIS — L30.4 CHAFING: ICD-10-CM

## 2022-08-22 DIAGNOSIS — R05.9 COUGH: ICD-10-CM

## 2022-08-22 PROCEDURE — 71046 X-RAY EXAM CHEST 2 VIEWS: CPT

## 2022-08-22 PROCEDURE — 99213 OFFICE O/P EST LOW 20 MIN: CPT | Performed by: PHYSICIAN ASSISTANT

## 2022-08-22 RX ORDER — LORATADINE 10 MG/1
CAPSULE, LIQUID FILLED ORAL
COMMUNITY

## 2022-08-22 RX ORDER — METHYLPREDNISOLONE 4 MG/1
TABLET ORAL
Qty: 21 EACH | Refills: 0 | Status: SHIPPED | OUTPATIENT
Start: 2022-08-22 | End: 2022-08-31 | Stop reason: ALTCHOICE

## 2022-08-22 RX ORDER — UREA 10 %
LOTION (ML) TOPICAL
COMMUNITY

## 2022-08-22 NOTE — PROGRESS NOTES
"Subjective     CHIEF COMPLAINT    Chief Complaint   Patient presents with   • covid positive     Pt took home test 8/12 and it was + and went to [facility] on 8/13. Sx are still present. Pt also took another test on 8/18, it was +   • Fever   • Cough   • Nasal Congestion   • Fatigue   • Headache            This is a 72-year-old male presenting to the clinic after a positive COVID test on 8/12/2022.  He went to the emergency department on 8/13 and had negative chest x-ray.  It he reports that they treated him with steroids and a Z-Toro.  Initially his symptoms improved with that treatment but since yesterday he has been feeling quite a bit worse.  He has not had a significant amount of shortness of breath.  He has been taking Mucinex and \"the strongest cough medicine the pharmacist could give me\" in order to manage his symptoms at home.  He also has an albuterol inhaler which she has been using sparingly.  He reports that they did discuss Paxlovid in the emergency department but he declined because \"he has known to many people with side effects from the medication.\"  He is requesting another steroid or antibiotic to treat his infection.    Additionally, he reports that he has a rash in his groin area that began after mowing the grass on a really hot day a week or 2 ago.  He states it is a burning and itching rash on both sides of his groin and he is requesting treatment for the \"chafing.\"            Review of Systems   Constitutional: Positive for fatigue. Negative for chills and fever.   HENT: Positive for rhinorrhea. Negative for sore throat.    Respiratory: Positive for cough and shortness of breath. Negative for wheezing.    Cardiovascular: Negative for chest pain.   Gastrointestinal: Negative for abdominal pain, diarrhea, nausea and vomiting.   Musculoskeletal: Negative for myalgias.   Skin: Negative for rash.   Neurological: Positive for headaches.            Past Medical History:   Diagnosis Date   • Asthma    • " CAD (coronary artery disease)     hx stents june 2020   • Pancreas (digestive gland) works poorly    • Skin cancer    • Sleep apnea             Past Surgical History:   Procedure Laterality Date   • BACK SURGERY     • CHOLECYSTECTOMY     • COLONOSCOPY  2020    NORMAL   • CORONARY ANGIOPLASTY WITH STENT PLACEMENT      TWICE   • FRACTURE SURGERY  Various   • HEMORRHOIDECTOMY     • PANCREAS SURGERY      STENTING   • SHOULDER SURGERY     • SINUS SURGERY  2022    Deviated septum   • TONSILLECTOMY     • TUMOR REMOVAL      Roof Of Mouth            Family History   Problem Relation Age of Onset   • Leukemia Mother    • No Known Problems Father    • No Known Problems Sister    • Cancer Brother         Colon cancer   • Cancer Brother         Prostate   • Cancer Brother         Lung cancer   • Cancer Brother         Lung cancer   • No Known Problems Maternal Grandmother    • No Known Problems Maternal Grandfather    • No Known Problems Paternal Grandmother    • No Known Problems Paternal Grandfather    • No Known Problems Maternal Aunt    • No Known Problems Maternal Uncle    • No Known Problems Paternal Aunt    • No Known Problems Paternal Uncle    • Cancer Other    • Anesthesia problems Neg Hx    • Broken bones Neg Hx    • Clotting disorder Neg Hx    • Collagen disease Neg Hx    • Diabetes Neg Hx    • Dislocations Neg Hx    • Osteoporosis Neg Hx    • Rheumatologic disease Neg Hx    • Scoliosis Neg Hx    • Severe sprains Neg Hx             Social History     Socioeconomic History   • Marital status:    Tobacco Use   • Smoking status: Never Smoker   • Smokeless tobacco: Never Used   Substance and Sexual Activity   • Alcohol use: Yes     Comment: Don’t drink weekly   • Drug use: No   • Sexual activity: Yes     Partners: Female     Birth control/protection: None            Allergies   Allergen Reactions   • Codeine    • Ibuprofen Nausea And Vomiting            Current Outpatient Medications on File Prior to Visit  "  Medication Sig Dispense Refill   • albuterol sulfate  (90 Base) MCG/ACT inhaler INHALE 2 PUFFS EVERY 6 HOURS AS NEEDED FOR SHORTNESS OF BREATH     • aspirin 81 MG chewable tablet Chew 81 mg Daily.     • atorvastatin (LIPITOR) 80 MG tablet Take 80 mg by mouth Every Night.     • clopidogrel (PLAVIX) 75 MG tablet Take 1 tablet by mouth Daily.     • Loratadine (Claritin) 10 MG capsule Take  by mouth.     • melatonin 1 MG tablet Take  by mouth.     • metoprolol succinate XL (TOPROL-XL) 25 MG 24 hr tablet Take 12.5 mg by mouth Daily.     • nitroglycerin (NITROSTAT) 0.4 MG SL tablet Place 0.4 mg under the tongue Every 5 (Five) Minutes As Needed for Chest Pain. Take no more than 3 doses in 15 minutes.     • omeprazole (priLOSEC) 20 MG capsule Take 20 mg by mouth Daily.     • tamsulosin (FLOMAX) 0.4 MG capsule 24 hr capsule Take 1 capsule by mouth 2 (Two) Times a Day.       No current facility-administered medications on file prior to visit.            /54 (BP Location: Left arm, Patient Position: Sitting, Cuff Size: Adult)   Pulse 74   Temp 99 °F (37.2 °C) (Oral)   Ht 177.8 cm (70\")   Wt 100 kg (221 lb 3.2 oz)   SpO2 97% Comment: room air  BMI 31.74 kg/m²          Objective     Physical Exam  Vitals and nursing note reviewed.   Constitutional:       General: He is not in acute distress.     Appearance: Normal appearance.   HENT:      Head: Normocephalic and atraumatic.   Cardiovascular:      Rate and Rhythm: Normal rate and regular rhythm.      Heart sounds: Normal heart sounds.   Pulmonary:      Effort: Pulmonary effort is normal.      Breath sounds: Normal breath sounds. No wheezing, rhonchi or rales.   Skin:     General: Skin is warm and dry.   Neurological:      Mental Status: He is alert and oriented to person, place, and time.   Psychiatric:         Mood and Affect: Mood normal.         Behavior: Behavior normal.              Procedures                    Lab Results (last 24 hours)     ** No " results found for the last 24 hours. **                XR Chest PA & Lateral    Result Date: 8/22/2022  PROCEDURE: XR CHEST PA AND LATERAL  COMPARISON: None  INDICATIONS: covid positive, cough, shortness of breath  FINDINGS:  Lungs are clear bilaterally. Cardiac and mediastinal contours within normal limits. Regional skeleton within normal limits for age.  Status post cholecystectomy.         1. No acute cardiopulmonary disease.     BEAU GONZALEZ MD       Electronically Signed and Approved By: BEAU GONZALEZ MD on 8/22/2022 at 13:54                               Diagnoses and all orders for this visit:    1. COVID-19 (Primary)  -     XR Chest PA & Lateral; Future    2. Cough  -     XR Chest PA & Lateral; Future  -     methylPREDNISolone (MEDROL) 4 MG dose pack; Take as directed on package instructions.  Dispense: 21 each; Refill: 0    3. Chafing  -     hydrocortisone-zinc oxide-bacitracin-nystatin cream; Apply to affected area as needed.  Dispense: 240 g; Refill: 0                           FOR FULL DISCHARGE INSTRUCTIONS/COMMENTS/HANDOUTS please see the AVS

## 2022-08-22 NOTE — PATIENT INSTRUCTIONS
Karan thank you for letting me care for you today!    I've ordered a chest x-ray to evaluate your persistent COVID symptoms. I'll call you with those results later today and if we need to we can call in an antibiotic. I've sent another steroid pack to help treat your COVID as well. If your symptoms are worsening please let us know or go to the emergency department.     Try the cream I've sent for your chafing as well.

## 2022-08-25 ENCOUNTER — PRIOR AUTHORIZATION (OUTPATIENT)
Dept: FAMILY MEDICINE CLINIC | Age: 72
End: 2022-08-25

## 2022-08-25 NOTE — TELEPHONE ENCOUNTER
Denial letter attached-      REFERRAL/PRE-AUTH MRN - SCAN - COMPOUNDMEDDENIAL/OPTUM[employer]/08/25/22 (08/25/2022)

## 2022-08-25 NOTE — TELEPHONE ENCOUNTER
PA for Hydrocortisone-Zinc Oxide-Bacitracin-Nystatin Cream sent to plan via CoverMyMeds    Key: [id number]

## 2022-08-26 DIAGNOSIS — L30.4 CHAFING: Primary | ICD-10-CM

## 2022-08-26 RX ORDER — CLOTRIMAZOLE AND BETAMETHASONE DIPROPIONATE 10; .64 MG/G; MG/G
1 CREAM TOPICAL 2 TIMES DAILY
Qty: 45 G | Refills: 0 | Status: SHIPPED | OUTPATIENT
Start: 2022-08-26 | End: 2022-09-08

## 2022-08-31 ENCOUNTER — OFFICE VISIT (OUTPATIENT)
Dept: CARDIOLOGY | Facility: CLINIC | Age: 72
End: 2022-08-31

## 2022-08-31 VITALS
BODY MASS INDEX: 31.64 KG/M2 | HEIGHT: 70 IN | WEIGHT: 221 LBS | DIASTOLIC BLOOD PRESSURE: 72 MMHG | SYSTOLIC BLOOD PRESSURE: 112 MMHG | HEART RATE: 63 BPM

## 2022-08-31 DIAGNOSIS — I25.10 CORONARY ARTERY DISEASE INVOLVING NATIVE CORONARY ARTERY OF NATIVE HEART WITHOUT ANGINA PECTORIS: Primary | ICD-10-CM

## 2022-08-31 DIAGNOSIS — E78.2 MIXED HYPERLIPIDEMIA: ICD-10-CM

## 2022-08-31 PROCEDURE — 99204 OFFICE O/P NEW MOD 45 MIN: CPT | Performed by: INTERNAL MEDICINE

## 2022-08-31 PROCEDURE — 93000 ELECTROCARDIOGRAM COMPLETE: CPT | Performed by: INTERNAL MEDICINE

## 2022-08-31 NOTE — PROGRESS NOTES
[facility] New Patient Office Note     Encounter Date:22  Patient:Karan Graham  :1950  MRN:[id number]    Referring Provider: Self    Consulted for: Chest pain and established care    Chief Complaint:   Chief Complaint   Patient presents with   • Establish Care     History of Presenting Illness:      Mr. Graham is a 72 y.o. gentleman with past medical history notable for coronary artery disease status post percutaneous intervention, mixed hyperlipidemia, obstructive sleep apnea not on CPAP, gastric reflux disease, easy bruising, and cavernous sinus tumor who presents to our office for new patient visit and establish with a new cardiologist as well as symptoms of shortness of breath and dyspnea on exertion.  In general patient has been doing fairly well from a cardiac perspective.  He was followed out at [facility] for his coronary artery disease.  He has some concerns about going back down to [facility] just due to travel and thus was reestablishing with new providers including a new primary care physician.  Also establishing with us.  In general he has been doing fairly well but has been having worsening dyspnea on exertion and shortness of breath.  He states that he has a lot of shortness of breath walking [place].  This is similar to what he was having before his heart stents.  From what I can tell both stents were placed in the setting of worsening shortness of breath and dyspnea exertion.  It sounds like he went to the emergency room and was subsequently sent up to [facility] and underwent cardiac catheterization.  He is not sure if he had a heart attack or not.  Records are not available to me at this time.      Review of Systems:  Review of Systems   Constitutional: Positive for malaise/fatigue.   HENT: Negative.    Eyes: Negative.    Cardiovascular: Positive for dyspnea on exertion.   Respiratory: Positive for shortness of breath.    Endocrine: Negative.     Hematologic/Lymphatic: Bruises/bleeds easily.   Skin: Negative.    Musculoskeletal: Negative.    Gastrointestinal: Negative.    Genitourinary: Negative.    Neurological: Negative.    Psychiatric/Behavioral: Negative.    Allergic/Immunologic: Negative.        Current Outpatient Medications on File Prior to Visit   Medication Sig Dispense Refill   • albuterol sulfate  (90 Base) MCG/ACT inhaler INHALE 2 PUFFS EVERY 6 HOURS AS NEEDED FOR SHORTNESS OF BREATH     • aspirin 81 MG chewable tablet Chew 81 mg Daily.     • atorvastatin (LIPITOR) 80 MG tablet Take 80 mg by mouth Every Night.     • clopidogrel (PLAVIX) 75 MG tablet Take 1 tablet by mouth Daily.     • clotrimazole-betamethasone (Lotrisone) 1-0.05 % cream Apply 1 application topically to the appropriate area as directed 2 (Two) Times a Day. 45 g 0   • hydrocortisone-zinc oxide-bacitracin-nystatin cream Apply to affected area as needed. 240 g 0   • Loratadine 10 MG capsule Take  by mouth.     • melatonin 1 MG tablet Take  by mouth.     • metoprolol succinate XL (TOPROL-XL) 25 MG 24 hr tablet Take 12.5 mg by mouth Daily.     • nitroglycerin (NITROSTAT) 0.4 MG SL tablet Place 0.4 mg under the tongue Every 5 (Five) Minutes As Needed for Chest Pain. Take no more than 3 doses in 15 minutes.     • omeprazole (priLOSEC) 20 MG capsule Take 20 mg by mouth Daily.     • tamsulosin (FLOMAX) 0.4 MG capsule 24 hr capsule Take 1 capsule by mouth 2 (Two) Times a Day.     • [DISCONTINUED] methylPREDNISolone (MEDROL) 4 MG dose pack Take as directed on package instructions. 21 each 0     No current facility-administered medications on file prior to visit.       Allergies   Allergen Reactions   • Codeine    • Ibuprofen Nausea And Vomiting       Past Medical History:   Diagnosis Date   • Asthma    • CAD (coronary artery disease)     hx stents june 2020   • Pancreas (digestive gland) works poorly    • Skin cancer    • Sleep apnea        Past Surgical History:   Procedure  Laterality Date   • BACK SURGERY     • CHOLECYSTECTOMY     • COLONOSCOPY  2020    NORMAL   • CORONARY ANGIOPLASTY WITH STENT PLACEMENT      TWICE   • FRACTURE SURGERY  Various   • HEMORRHOIDECTOMY     • PANCREAS SURGERY      STENTING   • SHOULDER SURGERY     • SINUS SURGERY  2022    Deviated septum   • TONSILLECTOMY     • TUMOR REMOVAL      Roof Of Mouth       Social History     Socioeconomic History   • Marital status:    Tobacco Use   • Smoking status: Never Smoker   • Smokeless tobacco: Never Used   Substance and Sexual Activity   • Alcohol use: Yes     Comment: Don’t drink weekly/caffeine use   • Drug use: No   • Sexual activity: Yes     Partners: Female     Birth control/protection: None       Family History   Problem Relation Age of Onset   • Hypertension Mother    • Leukemia Mother    • No Known Problems Father    • Hypertension Sister    • Heart disease Brother    • Cancer Brother         Colon cancer   • Cancer Brother         Prostate   • Cancer Brother         Lung cancer   • Cancer Brother         Lung cancer   • No Known Problems Maternal Aunt    • No Known Problems Maternal Uncle    • No Known Problems Paternal Aunt    • No Known Problems Paternal Uncle    • No Known Problems Maternal Grandmother    • No Known Problems Maternal Grandfather    • No Known Problems Paternal Grandmother    • No Known Problems Paternal Grandfather    • Cancer Other    • Anesthesia problems Neg Hx    • Broken bones Neg Hx    • Clotting disorder Neg Hx    • Collagen disease Neg Hx    • Diabetes Neg Hx    • Dislocations Neg Hx    • Osteoporosis Neg Hx    • Rheumatologic disease Neg Hx    • Scoliosis Neg Hx    • Severe sprains Neg Hx        The following portions of the patient's history were reviewed and updated as appropriate: allergies, current medications, past family history, past medical history, past social history, past surgical history and problem list.       Objective:       Vitals:    08/31/22 0925   BP:  "112/72   BP Location: Left arm   Patient Position: Sitting   Pulse: 63   Weight: 100 kg (221 lb)   Height: 177.8 cm (70\")       Body mass index is 31.71 kg/m².    Physical Exam:  Constitutional: Well appearing, Well-developed, No acute distress   HENT: Oropharynx clear and membrane moist  Eyes: Normal conjunctiva, no sclera icterus.  Neck: Supple, no carotid bruit bilaterally.  Cardiovascular: Regular rate and rhythm, No Murmur, No bilateral lower extremity edema.  Pulmonary: Normal respiratory effort, Normal lung sounds, no wheezing.  Abdominal: Soft, nontender, no hepatosplenomegaly, liver is non-pulsatile.  Neurological: Alert and orient x 3.   Skin: Warm, dry, scattered ecchymosis, no rash.  Psych: Appropriate mood and affect. Normal judgment and insight.      Lab Results   Component Value Date     09/09/2020     09/08/2020    K 3.5 09/09/2020    K 4.0 09/08/2020     09/09/2020     09/08/2020    CO2 25.4 09/09/2020    CO2 23.1 09/08/2020    BUN 7 (L) 09/09/2020    BUN 9 09/08/2020    CREATININE 0.78 09/09/2020    CREATININE 0.81 09/08/2020    EGFRIFNONA 99 09/09/2020    EGFRIFNONA 95 09/08/2020    GLUCOSE 98 09/09/2020    GLUCOSE 88 09/08/2020    CALCIUM 9.0 09/09/2020    CALCIUM 9.1 09/08/2020    ALBUMIN 4.00 09/08/2020    ALBUMIN 4.4 08/09/2019    BILITOT 0.9 09/08/2020    BILITOT 0.8 08/09/2019    AST 18 09/08/2020    AST 27 08/09/2019    ALT 16 09/08/2020    ALT 33 08/09/2019     Lab Results   Component Value Date    WBC 6.22 08/12/2022    WBC 6.24 04/04/2022    HGB 13.4 08/12/2022    HGB 13.5 04/04/2022    HCT 41.6 08/12/2022    HCT 40.3 (L) 04/04/2022    MCV 93.5 08/12/2022    MCV 91.0 04/04/2022     08/12/2022     04/04/2022     Lab Results   Component Value Date    CHOL 95 08/12/2022    CHOL 77 10/19/2020    TRIG 111 08/12/2022    TRIG 83 10/19/2020    HDL 31 (L) 08/12/2022    HDL 35 (L) 10/19/2020    LDL 43 08/12/2022    LDL 25 10/19/2020     Lab Results   Component " Value Date    PROBNP 34.7 03/23/2017    PROBNP 35.2 03/22/2017    BNP <11.1 08/09/2019     Lab Results   Component Value Date    CKTOTAL 53 03/22/2017    CKMB 1.32 03/22/2017    TROPONINI <0.012 08/09/2019    TROPONINT <0.010 03/23/2017     Lab Results   Component Value Date    TSH 1.190 08/12/2022    TSH 0.776 03/23/2017         ECG 12 Lead    Date/Time: 8/31/2022 9:46 AM  Performed by: Eduard Pedroza MD  Authorized by: Eduard Pedroza MD   Previous ECG: no previous ECG available  Rhythm: sinus rhythm  Ectopy: atrial premature contractions  Conduction: right bundle branch block        Stent card 8/13/2020:  · Xience RALEIGH 2.5 x 23 mm placed to distal RCA    Stent card 6/26/2020:  · Promus RALEIGH placed LAD Dr. Cox at [facility]    Echocardiogram 6/22/2018 [facility]:  · Overall left ventricular function is normal. The estimated ejection fraction is 60-65%.   · Trace-to-mild mitral regurgitation is present.   · Patent foramen ovale.     Carotid duplex 6/21/2018 [facility]:  · Bilateral internal carotid arteries with plaque but <50% stenosis.  · Bilateral vertebral arteries with antegrade flow.  · No prior examination for comparison.           Assessment:          Diagnosis Plan   1. Coronary artery disease involving native coronary artery of native heart without angina pectoris  ECG 12 Lead    Stress Test With Myocardial Perfusion One Day    Adult Transthoracic Echo Complete W/ Cont if Necessary Per Protocol   2. Mixed hyperlipidemia            Plan:       Mr. Graham is a 72 y.o. gentleman with past medical history notable for coronary artery disease status post percutaneous intervention, mixed hyperlipidemia, obstructive sleep apnea not on CPAP, gastric reflux disease, easy bruising, and cavernous sinus tumor who presents to our office for new patient visit and establish with a new cardiologist as well as symptoms of shortness of breath and dyspnea on exertion.  I am concerned about his  symptoms of worsening dyspnea exertion and shortness of breath which in the past has been an anginal equivalent for him.  Would like to further evaluate with stress test and echocardiogram.  His primary care physician is already appropriately also had of referred to a pulmonologist.  We will follow-up with these results.  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  I would like to follow-up with a stress test first before de-escalating his antiplatelet therapy.  Additionally like to get records from [facility] regarding his cardiac cath reports to no more specifics regarding his LAD stent and other coronary anatomy.    Coronary artery disease with stable angina:  · We will follow-up on stress test and echocardiogram  · Continue dual antiplatelet therapy but may be able to de-escalate pending stress test results  · Continue statin  · Continue low-dose beta-blocker    Mixed hyperlipidemia:  · Continue statin therapy  · LDL well controlled 8/2022  · CMP with normal ALT and AST 8/2022    Follow-up:  6 weeks    Thank you for allowing me to participate in the care of Karan Graham. Feel free to contact me directly with any further questions or concerns.    Eduard Pedroza MD  [facility]  08/31/22  10:16 EDT

## 2022-08-31 NOTE — PATIENT INSTRUCTIONS
Will follow up on stress test and echo  Will decide on adjusting aspirin or plavix based on stress test findings

## 2022-09-08 ENCOUNTER — OFFICE VISIT (OUTPATIENT)
Dept: FAMILY MEDICINE CLINIC | Age: 72
End: 2022-09-08

## 2022-09-08 VITALS
WEIGHT: 224 LBS | HEIGHT: 70 IN | HEART RATE: 74 BPM | TEMPERATURE: 98.1 F | SYSTOLIC BLOOD PRESSURE: 137 MMHG | BODY MASS INDEX: 32.07 KG/M2 | DIASTOLIC BLOOD PRESSURE: 68 MMHG

## 2022-09-08 DIAGNOSIS — S29.012A MUSCLE STRAIN OF UPPER BACK: Primary | ICD-10-CM

## 2022-09-08 DIAGNOSIS — R21 RASH: ICD-10-CM

## 2022-09-08 DIAGNOSIS — L73.9 FOLLICULITIS: ICD-10-CM

## 2022-09-08 PROCEDURE — 99213 OFFICE O/P EST LOW 20 MIN: CPT | Performed by: NURSE PRACTITIONER

## 2022-09-08 RX ORDER — CEPHALEXIN 500 MG/1
500 CAPSULE ORAL 2 TIMES DAILY
Qty: 10 CAPSULE | Refills: 0 | Status: SHIPPED | OUTPATIENT
Start: 2022-09-08 | End: 2022-09-13

## 2022-09-08 RX ORDER — CYCLOBENZAPRINE HCL 10 MG
10 TABLET ORAL 3 TIMES DAILY PRN
Qty: 60 TABLET | Refills: 1 | Status: SHIPPED | OUTPATIENT
Start: 2022-09-08 | End: 2023-01-04 | Stop reason: SDUPTHER

## 2022-09-13 NOTE — PROGRESS NOTES
"Chief Complaint  Back Pain (Pulled a muscle in back, used last muscle relaxer ( Refill), Scalp broken out ) and Hair/Scalp Problem    Subjective        Karan Graham presents to [facility]  Back Pain  This is a recurrent problem. The current episode started in the past 7 days. The problem occurs intermittently. The problem has been waxing and waning since onset. The pain is present in the thoracic spine. The quality of the pain is described as aching and cramping. The pain does not radiate. The pain is moderate. The symptoms are aggravated by position. Pertinent negatives include no bladder incontinence, bowel incontinence, fever, tingling or weakness. He has tried muscle relaxant for the symptoms. The treatment provided moderate relief.   Rash  This is a new problem. The current episode started 1 to 4 weeks ago. The problem is unchanged. The affected locations include the scalp. The rash is characterized by itchiness and redness. He was exposed to a new animal. Pertinent negatives include no fever. Past treatments include anti-itch cream. The treatment provided no relief.       Objective   Vital Signs:  /68 (BP Location: Right arm, Patient Position: Sitting, Cuff Size: Adult)   Pulse 74   Temp 98.1 °F (36.7 °C) (Oral)   Ht 177.8 cm (70\")   Wt 102 kg (224 lb)   BMI 32.14 kg/m²   Estimated body mass index is 32.14 kg/m² as calculated from the following:    Height as of this encounter: 177.8 cm (70\").    Weight as of this encounter: 102 kg (224 lb).          Physical Exam  HENT:      Head: Normocephalic.   Cardiovascular:      Rate and Rhythm: Normal rate and regular rhythm.   Pulmonary:      Effort: Pulmonary effort is normal. No respiratory distress.      Breath sounds: Normal breath sounds. No stridor. No wheezing, rhonchi or rales.   Musculoskeletal:      Thoracic back: Spasms and tenderness present.   Skin:     General: Skin is warm and dry.      Findings: Lesion and " rash present. Rash is papular, purpuric and urticarial.          Neurological:      Mental Status: He is alert and oriented to person, place, and time.   Psychiatric:         Mood and Affect: Mood normal.        Result Review :                Assessment and Plan   Diagnoses and all orders for this visit:    1. Muscle strain of upper back (Primary)  -     cyclobenzaprine (FLEXERIL) 10 MG tablet; Take 1 tablet by mouth 3 (Three) Times a Day As Needed for Muscle Spasms.  Dispense: 60 tablet; Refill: 1    2. Folliculitis  -     cephalexin (Keflex) 500 MG capsule; Take 1 capsule by mouth 2 (Two) Times a Day for 5 days.  Dispense: 10 capsule; Refill: 0    3. Rash  -     triamcinolone (KENALOG) 0.1 % ointment; Apply 1 application topically to the appropriate area as directed 2 (Two) Times a Day.  Dispense: 30 g; Refill: 2             Follow Up   Return if symptoms worsen or fail to improve.  Patient was given instructions and counseling regarding his condition or for health maintenance advice. Please see specific information pulled into the AVS if appropriate.

## 2022-09-22 ENCOUNTER — HOSPITAL ENCOUNTER (OUTPATIENT)
Dept: CARDIOLOGY | Facility: HOSPITAL | Age: 72
Discharge: HOME | End: 2022-09-22
Admitting: INTERNAL MEDICINE

## 2022-09-22 VITALS — BODY MASS INDEX: 32.19 KG/M2 | HEIGHT: 70 IN | WEIGHT: 224.87 LBS

## 2022-09-22 VITALS
DIASTOLIC BLOOD PRESSURE: 82 MMHG | HEART RATE: 76 BPM | WEIGHT: 224 LBS | BODY MASS INDEX: 32.07 KG/M2 | SYSTOLIC BLOOD PRESSURE: 130 MMHG | HEIGHT: 70 IN

## 2022-09-22 DIAGNOSIS — I25.10 CORONARY ARTERY DISEASE INVOLVING NATIVE CORONARY ARTERY OF NATIVE HEART WITHOUT ANGINA PECTORIS: ICD-10-CM

## 2022-09-22 LAB
AORTIC ARCH: 2.8 CM
ASCENDING AORTA: 3.5 CM
BH CV ECHO LEFT VENTRICLE GLOBAL LONGITUDINAL STRAIN: -23.2 %
BH CV ECHO MEAS - ACS: 2.06 CM
BH CV ECHO MEAS - AO MAX PG: 8.3 MMHG
BH CV ECHO MEAS - AO MEAN PG: 4.1 MMHG
BH CV ECHO MEAS - AO ROOT DIAM: 3.8 CM
BH CV ECHO MEAS - AO V2 MAX: 143.7 CM/SEC
BH CV ECHO MEAS - AO V2 VTI: 30 CM
BH CV ECHO MEAS - AVA(I,D): 2.07 CM2
BH CV ECHO MEAS - EDV(CUBED): 41 ML
BH CV ECHO MEAS - EDV(MOD-SP2): 74 ML
BH CV ECHO MEAS - EDV(MOD-SP4): 66 ML
BH CV ECHO MEAS - EF(MOD-BP): 68.4 %
BH CV ECHO MEAS - EF(MOD-SP2): 66.2 %
BH CV ECHO MEAS - EF(MOD-SP4): 69.7 %
BH CV ECHO MEAS - ESV(CUBED): 7.4 ML
BH CV ECHO MEAS - ESV(MOD-SP2): 25 ML
BH CV ECHO MEAS - ESV(MOD-SP4): 20 ML
BH CV ECHO MEAS - FS: 43.4 %
BH CV ECHO MEAS - IVS/LVPW: 1.06 CM
BH CV ECHO MEAS - IVSD: 1.49 CM
BH CV ECHO MEAS - LAT PEAK E' VEL: 7.1 CM/SEC
BH CV ECHO MEAS - LV DIASTOLIC VOL/BSA (35-75): 30.1 CM2
BH CV ECHO MEAS - LV MASS(C)D: 178.9 GRAMS
BH CV ECHO MEAS - LV MAX PG: 4.4 MMHG
BH CV ECHO MEAS - LV MEAN PG: 2.3 MMHG
BH CV ECHO MEAS - LV SYSTOLIC VOL/BSA (12-30): 9.1 CM2
BH CV ECHO MEAS - LV V1 MAX: 104.9 CM/SEC
BH CV ECHO MEAS - LV V1 VTI: 20.6 CM
BH CV ECHO MEAS - LVIDD: 3.4 CM
BH CV ECHO MEAS - LVIDS: 1.95 CM
BH CV ECHO MEAS - LVOT AREA: 3 CM2
BH CV ECHO MEAS - LVOT DIAM: 1.96 CM
BH CV ECHO MEAS - LVPWD: 1.41 CM
BH CV ECHO MEAS - MED PEAK E' VEL: 5.2 CM/SEC
BH CV ECHO MEAS - MR MAX PG: 101.1 MMHG
BH CV ECHO MEAS - MR MAX VEL: 502.9 CM/SEC
BH CV ECHO MEAS - MV A DUR: 0.12 SEC
BH CV ECHO MEAS - MV A MAX VEL: 87.8 CM/SEC
BH CV ECHO MEAS - MV DEC SLOPE: 244.5 CM/SEC2
BH CV ECHO MEAS - MV DEC TIME: 0.34 MSEC
BH CV ECHO MEAS - MV E MAX VEL: 61.7 CM/SEC
BH CV ECHO MEAS - MV E/A: 0.7
BH CV ECHO MEAS - MV MAX PG: 3.4 MMHG
BH CV ECHO MEAS - MV MEAN PG: 1.65 MMHG
BH CV ECHO MEAS - MV P1/2T: 96 MSEC
BH CV ECHO MEAS - MV V2 VTI: 25 CM
BH CV ECHO MEAS - MVA(P1/2T): 2.29 CM2
BH CV ECHO MEAS - MVA(VTI): 2.48 CM2
BH CV ECHO MEAS - PA ACC TIME: 0.09 SEC
BH CV ECHO MEAS - PA PR(ACCEL): 38.6 MMHG
BH CV ECHO MEAS - PA V2 MAX: 107.2 CM/SEC
BH CV ECHO MEAS - PULM A REVS DUR: 0.12 SEC
BH CV ECHO MEAS - PULM A REVS VEL: 23.1 CM/SEC
BH CV ECHO MEAS - PULM DIAS VEL: 32.6 CM/SEC
BH CV ECHO MEAS - PULM S/D: 1.08
BH CV ECHO MEAS - PULM SYS VEL: 35.1 CM/SEC
BH CV ECHO MEAS - QP/QS: 0.6
BH CV ECHO MEAS - RAP SYSTOLE: 3 MMHG
BH CV ECHO MEAS - RV MAX PG: 2.3 MMHG
BH CV ECHO MEAS - RV V1 MAX: 75.8 CM/SEC
BH CV ECHO MEAS - RV V1 VTI: 12.2 CM
BH CV ECHO MEAS - RVOT DIAM: 1.98 CM
BH CV ECHO MEAS - RVSP: 21.6 MMHG
BH CV ECHO MEAS - SI(MOD-SP2): 22.4 ML/M2
BH CV ECHO MEAS - SI(MOD-SP4): 21 ML/M2
BH CV ECHO MEAS - SUP REN AO DIAM: 2.2 CM
BH CV ECHO MEAS - SV(LVOT): 62.1 ML
BH CV ECHO MEAS - SV(MOD-SP2): 49 ML
BH CV ECHO MEAS - SV(MOD-SP4): 46 ML
BH CV ECHO MEAS - SV(RVOT): 37.4 ML
BH CV ECHO MEAS - TAPSE (>1.6): 1.99 CM
BH CV ECHO MEAS - TR MAX PG: 18.6 MMHG
BH CV ECHO MEAS - TR MAX VEL: 215.5 CM/SEC
BH CV ECHO MEASUREMENTS AVERAGE E/E' RATIO: 10.03
BH CV REST NUCLEAR ISOTOPE DOSE: 42.5 MCI
BH CV STRESS BP STAGE 1: NORMAL
BH CV STRESS COMMENTS STAGE 1: NORMAL
BH CV STRESS DOSE REGADENOSON STAGE 1: 0.4
BH CV STRESS DURATION MIN STAGE 1: 0
BH CV STRESS DURATION SEC STAGE 1: 10
BH CV STRESS HR STAGE 1: 94
BH CV STRESS NUCLEAR ISOTOPE DOSE: 42.5 MCI
BH CV STRESS PROTOCOL 1: NORMAL
BH CV STRESS RECOVERY BP: NORMAL MMHG
BH CV STRESS RECOVERY HR: 78 BPM
BH CV STRESS STAGE 1: 1
BH CV XLRA - RV BASE: 2.6 CM
BH CV XLRA - RV LENGTH: 7.3 CM
BH CV XLRA - RV MID: 2.37 CM
BH CV XLRA - TDI S': 14.3 CM/SEC
LEFT ATRIUM VOLUME INDEX: 17 ML/M2
LV EF NUC BP: 78 %
MAXIMAL PREDICTED HEART RATE: 150 BPM
MAXIMAL PREDICTED HEART RATE: 150 BPM
PERCENT MAX PREDICTED HR: 62.67 %
SINUS: 3.1 CM
STJ: 3.3 CM
STRESS BASELINE BP: NORMAL MMHG
STRESS BASELINE HR: 75 BPM
STRESS PERCENT HR: 74 %
STRESS POST EXERCISE DUR SEC: 10 SEC
STRESS POST PEAK BP: NORMAL MMHG
STRESS POST PEAK HR: 94 BPM
STRESS TARGET HR: 128 BPM
STRESS TARGET HR: 128 BPM

## 2022-09-22 PROCEDURE — 25010000002 REGADENOSON 0.4 MG/5ML SOLUTION: Performed by: INTERNAL MEDICINE

## 2022-09-22 PROCEDURE — 93017 CV STRESS TEST TRACING ONLY: CPT

## 2022-09-22 PROCEDURE — 93356 MYOCRD STRAIN IMG SPCKL TRCK: CPT | Performed by: INTERNAL MEDICINE

## 2022-09-22 PROCEDURE — 93018 CV STRESS TEST I&R ONLY: CPT | Performed by: INTERNAL MEDICINE

## 2022-09-22 PROCEDURE — A9555 RB82 RUBIDIUM: HCPCS | Performed by: INTERNAL MEDICINE

## 2022-09-22 PROCEDURE — 78492 MYOCRD IMG PET MLT RST&STRS: CPT | Performed by: INTERNAL MEDICINE

## 2022-09-22 PROCEDURE — 0 RUBIDIUM CHLORIDE: Performed by: INTERNAL MEDICINE

## 2022-09-22 PROCEDURE — 25010000002 AMINOPHYLLINE PER 250 MG: Performed by: INTERNAL MEDICINE

## 2022-09-22 PROCEDURE — 93016 CV STRESS TEST SUPVJ ONLY: CPT | Performed by: INTERNAL MEDICINE

## 2022-09-22 PROCEDURE — 93306 TTE W/DOPPLER COMPLETE: CPT

## 2022-09-22 PROCEDURE — 93306 TTE W/DOPPLER COMPLETE: CPT | Performed by: INTERNAL MEDICINE

## 2022-09-22 PROCEDURE — 93356 MYOCRD STRAIN IMG SPCKL TRCK: CPT

## 2022-09-22 PROCEDURE — 78492 MYOCRD IMG PET MLT RST&STRS: CPT

## 2022-09-22 RX ORDER — AMINOPHYLLINE DIHYDRATE 25 MG/ML
125 INJECTION, SOLUTION INTRAVENOUS ONCE
Status: COMPLETED | OUTPATIENT
Start: 2022-09-22 | End: 2022-09-22

## 2022-09-22 RX ADMIN — AMINOPHYLLINE 125 MG: 25 INJECTION, SOLUTION INTRAVENOUS at 09:57

## 2022-09-22 RX ADMIN — REGADENOSON 0.4 MG: 0.08 INJECTION, SOLUTION INTRAVENOUS at 09:45

## 2022-10-24 ENCOUNTER — OFFICE VISIT (OUTPATIENT)
Dept: CARDIOLOGY | Facility: CLINIC | Age: 72
End: 2022-10-24

## 2022-10-24 VITALS
WEIGHT: 224.2 LBS | DIASTOLIC BLOOD PRESSURE: 72 MMHG | SYSTOLIC BLOOD PRESSURE: 122 MMHG | HEART RATE: 64 BPM | BODY MASS INDEX: 32.1 KG/M2 | HEIGHT: 70 IN

## 2022-10-24 DIAGNOSIS — I25.10 CORONARY ARTERY DISEASE INVOLVING NATIVE CORONARY ARTERY OF NATIVE HEART WITHOUT ANGINA PECTORIS: Primary | ICD-10-CM

## 2022-10-24 DIAGNOSIS — E78.2 MIXED HYPERLIPIDEMIA: ICD-10-CM

## 2022-10-24 DIAGNOSIS — I10 ESSENTIAL HYPERTENSION: ICD-10-CM

## 2022-10-24 PROCEDURE — 99214 OFFICE O/P EST MOD 30 MIN: CPT | Performed by: INTERNAL MEDICINE

## 2022-10-24 PROCEDURE — 93000 ELECTROCARDIOGRAM COMPLETE: CPT | Performed by: INTERNAL MEDICINE

## 2022-10-24 NOTE — PROGRESS NOTES
[facility] Follow Up Office Note     Encounter Date:10/24/22  Patient:Karan Graham  :1950  MRN:[id number]      Chief Complaint:   Chief Complaint   Patient presents with   • Coronary Artery Disease     6 week f/u     History of Presenting Illness:      Mr. Graham is a 72 y.o. gentleman with past medical history notable for coronary artery disease status post percutaneous intervention, mixed hyperlipidemia, obstructive sleep apnea not on CPAP, gastric reflux disease, easy bruising, and cavernous sinus tumor who presents to our office for scheduled follow-up.  Overall patient is doing well no new issues.      Review of Systems:  Review of Systems   Constitutional: Positive for malaise/fatigue.   HENT: Negative.    Eyes: Negative.    Cardiovascular: Positive for dyspnea on exertion.   Respiratory: Positive for shortness of breath.    Endocrine: Negative.    Hematologic/Lymphatic: Bruises/bleeds easily.   Skin: Negative.    Musculoskeletal: Negative.    Gastrointestinal: Negative.    Genitourinary: Negative.    Neurological: Negative.    Psychiatric/Behavioral: Negative.    Allergic/Immunologic: Negative.        Current Outpatient Medications on File Prior to Visit   Medication Sig Dispense Refill   • albuterol sulfate  (90 Base) MCG/ACT inhaler INHALE 2 PUFFS EVERY 6 HOURS AS NEEDED FOR SHORTNESS OF BREATH     • aspirin 81 MG chewable tablet Chew 81 mg Daily.     • atorvastatin (LIPITOR) 80 MG tablet Take 80 mg by mouth Every Night.     • clopidogrel (PLAVIX) 75 MG tablet Take 1 tablet by mouth Daily.     • cyclobenzaprine (FLEXERIL) 10 MG tablet Take 1 tablet by mouth 3 (Three) Times a Day As Needed for Muscle Spasms. 60 tablet 1   • Loratadine 10 MG capsule Take  by mouth.     • melatonin 1 MG tablet Take  by mouth.     • metoprolol succinate XL (TOPROL-XL) 25 MG 24 hr tablet Take 12.5 mg by mouth Daily.     • nitroglycerin (NITROSTAT) 0.4 MG SL tablet Place 0.4 mg under the tongue Every 5  (Five) Minutes As Needed for Chest Pain. Take no more than 3 doses in 15 minutes.     • omeprazole (priLOSEC) 20 MG capsule Take 20 mg by mouth Daily.     • tamsulosin (FLOMAX) 0.4 MG capsule 24 hr capsule Take 1 capsule by mouth 2 (Two) Times a Day.     • triamcinolone (KENALOG) 0.1 % ointment Apply 1 application topically to the appropriate area as directed 2 (Two) Times a Day. 30 g 2     No current facility-administered medications on file prior to visit.       Allergies   Allergen Reactions   • Codeine    • Ibuprofen Nausea And Vomiting       Past Medical History:   Diagnosis Date   • Asthma    • CAD (coronary artery disease)     hx stents june 2020   • Pancreas (digestive gland) works poorly    • Skin cancer    • Sleep apnea        Past Surgical History:   Procedure Laterality Date   • BACK SURGERY     • CHOLECYSTECTOMY     • COLONOSCOPY  2020    NORMAL   • CORONARY ANGIOPLASTY WITH STENT PLACEMENT      TWICE   • FRACTURE SURGERY  Various   • HEMORRHOIDECTOMY     • PANCREAS SURGERY      STENTING   • SHOULDER SURGERY     • SINUS SURGERY  2022    Deviated septum   • TONSILLECTOMY     • TUMOR REMOVAL      Roof Of Mouth       Social History     Socioeconomic History   • Marital status:    Tobacco Use   • Smoking status: Never   • Smokeless tobacco: Never   Substance and Sexual Activity   • Alcohol use: Yes     Comment: Don’t drink weekly/caffeine use   • Drug use: No   • Sexual activity: Yes     Partners: Female     Birth control/protection: None       Family History   Problem Relation Age of Onset   • Hypertension Mother    • Leukemia Mother    • No Known Problems Father    • Hypertension Sister    • Heart disease Brother    • Cancer Brother         Colon cancer   • Cancer Brother         Prostate   • Cancer Brother         Lung cancer   • Cancer Brother         Lung cancer   • No Known Problems Maternal Aunt    • No Known Problems Maternal Uncle    • No Known Problems Paternal Aunt    • No Known Problems  "Paternal Uncle    • No Known Problems Maternal Grandmother    • No Known Problems Maternal Grandfather    • No Known Problems Paternal Grandmother    • No Known Problems Paternal Grandfather    • Cancer Other    • Anesthesia problems Neg Hx    • Broken bones Neg Hx    • Clotting disorder Neg Hx    • Collagen disease Neg Hx    • Diabetes Neg Hx    • Dislocations Neg Hx    • Osteoporosis Neg Hx    • Rheumatologic disease Neg Hx    • Scoliosis Neg Hx    • Severe sprains Neg Hx        The following portions of the patient's history were reviewed and updated as appropriate: allergies, current medications, past family history, past medical history, past social history, past surgical history and problem list.       Objective:       Vitals:    10/24/22 1034   BP: 122/72   BP Location: Left arm   Patient Position: Sitting   Pulse: 64   Weight: 102 kg (224 lb 3.2 oz)   Height: 177.8 cm (70\")       Body mass index is 32.17 kg/m².    Physical Exam:  Constitutional: Well appearing, Well-developed, No acute distress   HENT: Oropharynx clear and membrane moist  Eyes: Normal conjunctiva, no sclera icterus.  Neck: Supple, no carotid bruit bilaterally.  Cardiovascular: Regular rate and rhythm, No Murmur, No bilateral lower extremity edema.  Pulmonary: Normal respiratory effort, Normal lung sounds, no wheezing.  Abdominal: Soft, nontender, no hepatosplenomegaly, liver is non-pulsatile.  Neurological: Alert and orient x 3.   Skin: Warm, dry, scattered ecchymosis, no rash.  Psych: Appropriate mood and affect. Normal judgment and insight.      Lab Results   Component Value Date     09/09/2020     09/08/2020    K 3.5 09/09/2020    K 4.0 09/08/2020     09/09/2020     09/08/2020    CO2 25.4 09/09/2020    CO2 23.1 09/08/2020    BUN 7 (L) 09/09/2020    BUN 9 09/08/2020    CREATININE 0.78 09/09/2020    CREATININE 0.81 09/08/2020    EGFRIFNONA 99 09/09/2020    EGFRIFNONA 95 09/08/2020    GLUCOSE 98 09/09/2020    GLUCOSE " 88 09/08/2020    CALCIUM 9.0 09/09/2020    CALCIUM 9.1 09/08/2020    ALBUMIN 4.00 09/08/2020    ALBUMIN 4.4 08/09/2019    BILITOT 0.9 09/08/2020    BILITOT 0.8 08/09/2019    AST 18 09/08/2020    AST 27 08/09/2019    ALT 16 09/08/2020    ALT 33 08/09/2019     Lab Results   Component Value Date    WBC 6.22 08/12/2022    WBC 6.24 04/04/2022    HGB 13.4 08/12/2022    HGB 13.5 04/04/2022    HCT 41.6 08/12/2022    HCT 40.3 (L) 04/04/2022    MCV 93.5 08/12/2022    MCV 91.0 04/04/2022     08/12/2022     04/04/2022     Lab Results   Component Value Date    CHOL 95 08/12/2022    CHOL 77 10/19/2020    TRIG 111 08/12/2022    TRIG 83 10/19/2020    HDL 31 (L) 08/12/2022    HDL 35 (L) 10/19/2020    LDL 43 08/12/2022    LDL 25 10/19/2020     Lab Results   Component Value Date    PROBNP 34.7 03/23/2017    PROBNP 35.2 03/22/2017    BNP <11.1 08/09/2019     Lab Results   Component Value Date    CKTOTAL 53 03/22/2017    CKMB 1.32 03/22/2017    TROPONINI <0.012 08/09/2019    TROPONINT <0.010 03/23/2017     Lab Results   Component Value Date    TSH 1.190 08/12/2022    TSH 0.776 03/23/2017         ECG 12 Lead    Date/Time: 10/24/2022 11:07 AM  Performed by: Eduard Pedroza MD  Authorized by: Eduard Pedroza MD   Comparison: compared with previous ECG from 8/31/2022  Similar to previous ECG  Rhythm: sinus rhythm  Conduction: right bundle branch block        Cardiac stress test 9/22/2022:  · Myocardial perfusion imaging indicates a normal myocardial perfusion study with no evidence of ischemia.  · Left ventricular ejection fraction is hyperdynamic (Calculated EF > 70%). .  · Impressions are consistent with a low risk study.    Echocardiogram 9/22/2022 images reviewed by myself:  · Calculated left ventricular EF = 68.4%. Left ventricular systolic function is normal.All left ventricular wall segments contract normally.Normal Global longitudinal LV strain (GLS) = -23.2%.  · Left ventricular diastolic function was  normal.    Stent card 8/13/2020:  · Xience RALEIGH 2.5 x 23 mm placed to distal RCA    Stent card 6/26/2020:  · Promus RALEIGH placed LAD Dr. Cox at [facility]    Echocardiogram 6/22/2018 [facility]:  · Overall left ventricular function is normal. The estimated ejection fraction is 60-65%.   · Trace-to-mild mitral regurgitation is present.   · Patent foramen ovale.     Carotid duplex 6/21/2018 [facility]:  · Bilateral internal carotid arteries with plaque but <50% stenosis.  · Bilateral vertebral arteries with antegrade flow.  · No prior examination for comparison.           Assessment:          Diagnosis Plan   1. Coronary artery disease involving native coronary artery of native heart without angina pectoris  ECG 12 Lead      2. Essential hypertension        3. Mixed hyperlipidemia               Plan:       Mr. Graham is a 72 y.o. gentleman with past medical history notable for coronary artery disease status post percutaneous intervention, mixed hyperlipidemia, obstructive sleep apnea not on CPAP, gastric reflux disease, easy bruising, and cavernous sinus tumor who presents to our office for scheduled follow-up.  Overall patient is doing well.  No changes are needed this time.  We will plan on seeing him back in 6 months.      Coronary artery disease with stable angina:  · Stress test 9/2022 demonstrates no evidence of ischemia we will continue to monitor symptoms are mild we will hold off on any long-acting nitrates at this time but could add if symptoms worsen  · Continue dual antiplatelet therapy  · Continue statin  · Continue low-dose beta-blocker    Essential hypertension:  · Blood pressure well controlled no changes needed at this time    Mixed hyperlipidemia:  · Continue statin therapy  · LDL well controlled 8/2022  · CMP with normal ALT and AST 8/2022    Follow-up:  6 months    Thank you for allowing me to participate in the care of Karan Graham. Feel free to contact me directly with any  further questions or concerns.    Eduard Pedroza MD  [facility]  10/24/22  11:08 EDT

## 2022-11-03 ENCOUNTER — HOSPITAL ENCOUNTER (OUTPATIENT)
Dept: GENERAL RADIOLOGY | Facility: HOSPITAL | Age: 72
Discharge: HOME | End: 2022-11-03
Admitting: FAMILY MEDICINE

## 2022-11-03 ENCOUNTER — OFFICE VISIT (OUTPATIENT)
Dept: FAMILY MEDICINE CLINIC | Age: 72
End: 2022-11-03

## 2022-11-03 VITALS
DIASTOLIC BLOOD PRESSURE: 84 MMHG | HEIGHT: 70 IN | WEIGHT: 225 LBS | SYSTOLIC BLOOD PRESSURE: 138 MMHG | HEART RATE: 74 BPM | BODY MASS INDEX: 32.21 KG/M2

## 2022-11-03 DIAGNOSIS — M15.9 GENERALIZED OSTEOARTHRITIS: ICD-10-CM

## 2022-11-03 DIAGNOSIS — M25.571 ACUTE RIGHT ANKLE PAIN: ICD-10-CM

## 2022-11-03 DIAGNOSIS — E78.2 MIXED HYPERLIPIDEMIA: ICD-10-CM

## 2022-11-03 DIAGNOSIS — M25.562 ACUTE PAIN OF LEFT KNEE: ICD-10-CM

## 2022-11-03 DIAGNOSIS — K21.9 GERD WITHOUT ESOPHAGITIS: ICD-10-CM

## 2022-11-03 DIAGNOSIS — I10 ESSENTIAL HYPERTENSION: Primary | ICD-10-CM

## 2022-11-03 PROBLEM — J30.89 OTHER ALLERGIC RHINITIS: Status: ACTIVE | Noted: 2022-11-03

## 2022-11-03 PROCEDURE — 73610 X-RAY EXAM OF ANKLE: CPT

## 2022-11-03 PROCEDURE — 99214 OFFICE O/P EST MOD 30 MIN: CPT | Performed by: FAMILY MEDICINE

## 2022-11-03 PROCEDURE — 73562 X-RAY EXAM OF KNEE 3: CPT

## 2022-11-03 NOTE — PROGRESS NOTES
Chief Complaint  Hypertension and Arthritis (Requesting meds)    Subjective          Karan Graham presents to [facility]  History of Present Illness  --TOLERATING BLOOD PRESSURE MEDICATION WITHOUT APPARENT SIDE EFFECTS  --LAST LIPIDS WERE OK, NO ISSUES WITH MEDS  --GERD IS WELL-CONTROLLED WITH THE PPI  --THE OA HAS BEEN GETTING WORSE, ESPECIALLY IN THE RIGHT ANKLE AND LEFT KNEE.  TOOK SOME OLD MELOXICAM WITH GOOD RELEIF.  HE IS NO LONGER TAKING AN ANTICOAGULANT EXCEPT FOR PLAVIX.          Allergies   Allergen Reactions   • Codeine    • Ibuprofen Nausea And Vomiting        Health Maintenance Due   Topic Date Due   • COLORECTAL CANCER SCREENING  Never done   • ZOSTER VACCINE (1 of 2) Never done   • HEPATITIS C SCREENING  Never done   • ANNUAL WELLNESS VISIT  Never done   • COVID-19 Vaccine (4 - Booster for Pfizer series) 12/01/2021        Current Outpatient Medications on File Prior to Visit   Medication Sig   • albuterol sulfate  (90 Base) MCG/ACT inhaler INHALE 2 PUFFS EVERY 6 HOURS AS NEEDED FOR SHORTNESS OF BREATH   • aspirin 81 MG chewable tablet Chew 81 mg Daily.   • atorvastatin (LIPITOR) 80 MG tablet Take 80 mg by mouth Every Night.   • clopidogrel (PLAVIX) 75 MG tablet Take 1 tablet by mouth Daily.   • cyclobenzaprine (FLEXERIL) 10 MG tablet Take 1 tablet by mouth 3 (Three) Times a Day As Needed for Muscle Spasms.   • Loratadine 10 MG capsule Take  by mouth.   • melatonin 1 MG tablet Take  by mouth.   • metoprolol succinate XL (TOPROL-XL) 25 MG 24 hr tablet Take 12.5 mg by mouth Daily.   • nitroglycerin (NITROSTAT) 0.4 MG SL tablet Place 0.4 mg under the tongue Every 5 (Five) Minutes As Needed for Chest Pain. Take no more than 3 doses in 15 minutes.   • omeprazole (priLOSEC) 20 MG capsule Take 20 mg by mouth Daily.   • tamsulosin (FLOMAX) 0.4 MG capsule 24 hr capsule Take 1 capsule by mouth 2 (Two) Times a Day.   • triamcinolone (KENALOG) 0.1 % ointment Apply 1  "application topically to the appropriate area as directed 2 (Two) Times a Day.     No current facility-administered medications on file prior to visit.       Immunization History   Administered Date(s) Administered   • COVID-19 (PFIZER) PURPLE CAP 02/24/2021, 03/17/2021, 10/06/2021   • Pneumococcal Conjugate 13-Valent (PCV13) 08/08/2016   • Pneumococcal Polysaccharide (PPSV23) 10/01/2018       Review of Systems   Constitutional: Negative for activity change, appetite change, chills, fatigue and fever.   HENT: Negative for congestion, ear pain, rhinorrhea and sore throat.    Respiratory: Negative for cough and shortness of breath.    Cardiovascular: Negative for chest pain, palpitations and leg swelling.   Gastrointestinal: Negative for abdominal pain, constipation, diarrhea, nausea and vomiting.   Musculoskeletal: Negative for myalgias.   Neurological: Negative for headache.        Objective     /84 (BP Location: Left arm, Patient Position: Sitting)   Pulse 74   Ht 177.8 cm (70\")   Wt 102 kg (225 lb)   BMI 32.28 kg/m²       Physical Exam  Vitals and nursing note reviewed.   Constitutional:       General: He is not in acute distress.     Appearance: Normal appearance.   Cardiovascular:      Rate and Rhythm: Normal rate and regular rhythm.      Heart sounds: Normal heart sounds. No murmur heard.  Pulmonary:      Effort: Pulmonary effort is normal.      Breath sounds: Normal breath sounds.   Abdominal:      Palpations: Abdomen is soft.      Tenderness: There is no abdominal tenderness.   Musculoskeletal:      Cervical back: Neck supple.      Right lower leg: No edema.      Left lower leg: No edema.   Lymphadenopathy:      Cervical: No cervical adenopathy.   Neurological:      General: No focal deficit present.      Mental Status: He is alert.      Cranial Nerves: No cranial nerve deficit.      Coordination: Coordination normal.      Gait: Gait normal.   Psychiatric:         Mood and Affect: Mood normal.       "   Behavior: Behavior normal.         Result Review :                             Assessment and Plan      Diagnoses and all orders for this visit:    1. Essential hypertension (Primary)  Assessment & Plan:  Hypertension is improving with treatment.  Continue current treatment regimen.  Continue current medications.  Blood pressure will be reassessed at the next regular appointment.      2. Acute right ankle pain  -     XR Ankle 3+ View Right    3. Acute pain of left knee  -     XR Knee 3 View Left    4. Mixed hyperlipidemia  Assessment & Plan:  Lipid abnormalities are improving with treatment.  Nutritional counseling was provided.  Lipids will be reassessed in 6 months.      5. GERD without esophagitis  Assessment & Plan:  IMPROVED WITH CURRENT TREATMENT, CONTINUE SAME, WILL REEVALUATE AT NEXT VISIT       6. Generalized osteoarthritis  Assessment & Plan:  GETTING WORSE.  WE WILL GET SOME X-RAYS AND CHECK WITH HIS CARDIOLOGIST TO SEE IF HE CAN TAKE LOW DOSE PRN MELOXICAM             Follow Up     Return in about 6 months (around 5/3/2023).    Patient was given instructions and counseling regarding his condition or for health maintenance advice. Please see specific information pulled into the AVS if appropriate.

## 2022-11-07 ENCOUNTER — DOCUMENTATION (OUTPATIENT)
Dept: FAMILY MEDICINE CLINIC | Age: 72
End: 2022-11-07

## 2022-11-07 ENCOUNTER — TELEPHONE (OUTPATIENT)
Dept: FAMILY MEDICINE CLINIC | Age: 72
End: 2022-11-07

## 2022-11-07 RX ORDER — MELOXICAM 7.5 MG/1
7.5 TABLET ORAL DAILY
Qty: 30 TABLET | Refills: 1 | Status: SHIPPED | OUTPATIENT
Start: 2022-11-07 | End: 2022-12-30 | Stop reason: SDUPTHER

## 2022-11-07 NOTE — TELEPHONE ENCOUNTER
Yes would be fine to be on Meloxicam. With is coronary disease and antiplatelet use it is best to use the minimal dose needed to provide relief or even better on a PRN basis.  Any NSAID does have risk for increasing cardiovascular events or bleeding but he would be fine to trial on this with care to look for any bleeding issues.  Thanks

## 2022-11-07 NOTE — TELEPHONE ENCOUNTER
----- Message from Marshal Light MD sent at 11/7/2022  7:50 AM EST -----  I MAY HAVE ALREADY SENT THIS TO YOU BUT PLEASE ASK HIS CARDIOLOGIST IF WE CAN GIVE THIS MAN MELOXICAM WHILE HE IS TAKING PLAVIX.  EVEN A LOW PRN DOSE WOULD HELP.  THANKS

## 2022-11-07 NOTE — TELEPHONE ENCOUNTER
Please inform him of this information and let him know that I sent a prescription for 7.5 mg of meloxicam to take daily as needed. This is a lower dose and hopefully it will still help with symptoms.

## 2022-11-07 NOTE — ASSESSMENT & PLAN NOTE
GETTING WORSE.  WE WILL GET SOME X-RAYS AND CHECK WITH HIS CARDIOLOGIST TO SEE IF HE CAN TAKE LOW DOSE PRN MELOXICAM

## 2022-11-08 ENCOUNTER — PATIENT MESSAGE (OUTPATIENT)
Dept: FAMILY MEDICINE CLINIC | Age: 72
End: 2022-11-08

## 2022-11-09 ENCOUNTER — OFFICE VISIT (OUTPATIENT)
Dept: PULMONOLOGY | Facility: CLINIC | Age: 72
End: 2022-11-09

## 2022-11-09 VITALS
BODY MASS INDEX: 42.86 KG/M2 | TEMPERATURE: 98.5 F | DIASTOLIC BLOOD PRESSURE: 75 MMHG | OXYGEN SATURATION: 95 % | HEART RATE: 70 BPM | WEIGHT: 227 LBS | SYSTOLIC BLOOD PRESSURE: 120 MMHG | RESPIRATION RATE: 19 BRPM | HEIGHT: 61 IN

## 2022-11-09 DIAGNOSIS — I25.10 CORONARY ARTERY DISEASE INVOLVING NATIVE CORONARY ARTERY OF NATIVE HEART WITHOUT ANGINA PECTORIS: ICD-10-CM

## 2022-11-09 DIAGNOSIS — G47.30 OBSERVED SLEEP APNEA: ICD-10-CM

## 2022-11-09 DIAGNOSIS — K21.9 GERD WITHOUT ESOPHAGITIS: ICD-10-CM

## 2022-11-09 DIAGNOSIS — J45.20 MILD INTERMITTENT ASTHMA WITHOUT COMPLICATION: Primary | ICD-10-CM

## 2022-11-09 PROCEDURE — 99203 OFFICE O/P NEW LOW 30 MIN: CPT | Performed by: INTERNAL MEDICINE

## 2022-11-09 NOTE — PROGRESS NOTES
Pulmonary Consultation    Marshal Light*,    Thank you for asking me to see Karan Graham for   Chief Complaint   Patient presents with   • Establish Care     New Patient    • Shortness of Breath     On exertion    • Cough     Sometimes   .      History of Present Illness  Karan Graham is a 72 y.o. male with a PMH significant for coronary artery disease sleep apnea and bronchial asthma presents for evaluation of dyspnea on exertion patient denies any significant cough or expectoration patient denies any chest pain fever or hemoptysis he is s/p stent placement for coronary artery disease and is well controlled on albuterol inhaler on an as-needed basis he denies tobacco abuse there is significant family history of lung cancer       Tobacco use history:  Never smoker      Review of Systems: History obtained from chart review and the patient.  Review of Systems   Respiratory: Positive for shortness of breath.    All other systems reviewed and are negative.    As described in the HPI. Otherwise, remainder of ROS (14 systems) were negative.    Patient Active Problem List   Diagnosis   • Coronary artery disease (stents)    • Mild intermittent asthma, uncomplicated   • Observed sleep apnea (cannot tolerate CPAP)    • BPH with obstruction/lower urinary tract symptoms   • Essential hypertension   • GERD without esophagitis   • Mixed hyperlipidemia   • Generalized osteoarthritis   • Other allergic rhinitis         Current Outpatient Medications:   •  albuterol sulfate  (90 Base) MCG/ACT inhaler, INHALE 2 PUFFS EVERY 6 HOURS AS NEEDED FOR SHORTNESS OF BREATH, Disp: , Rfl:   •  aspirin 81 MG chewable tablet, Chew 81 mg Daily., Disp: , Rfl:   •  atorvastatin (LIPITOR) 80 MG tablet, Take 80 mg by mouth Every Night., Disp: , Rfl:   •  Celecoxib (CELEBREX PO), Take  by mouth., Disp: , Rfl:   •  clopidogrel (PLAVIX) 75 MG tablet, Take 1 tablet by mouth Daily., Disp: , Rfl:   •  cyclobenzaprine (FLEXERIL) 10 MG  tablet, Take 1 tablet by mouth 3 (Three) Times a Day As Needed for Muscle Spasms., Disp: 60 tablet, Rfl: 1  •  Loratadine 10 MG capsule, Take  by mouth., Disp: , Rfl:   •  melatonin 1 MG tablet, Take  by mouth., Disp: , Rfl:   •  meloxicam (MOBIC) 7.5 MG tablet, Take 1 tablet by mouth Daily for 30 days., Disp: 30 tablet, Rfl: 1  •  metoprolol succinate XL (TOPROL-XL) 25 MG 24 hr tablet, Take 12.5 mg by mouth Daily., Disp: , Rfl:   •  nitroglycerin (NITROSTAT) 0.4 MG SL tablet, Place 0.4 mg under the tongue Every 5 (Five) Minutes As Needed for Chest Pain. Take no more than 3 doses in 15 minutes., Disp: , Rfl:   •  omeprazole (priLOSEC) 20 MG capsule, Take 20 mg by mouth Daily., Disp: , Rfl:   •  tamsulosin (FLOMAX) 0.4 MG capsule 24 hr capsule, Take 1 capsule by mouth 2 (Two) Times a Day., Disp: , Rfl:   •  triamcinolone (KENALOG) 0.1 % ointment, Apply 1 application topically to the appropriate area as directed 2 (Two) Times a Day., Disp: 30 g, Rfl: 2    Allergies   Allergen Reactions   • Codeine    • Ibuprofen Nausea And Vomiting       Past Medical History:   Diagnosis Date   • Asthma    • CAD (coronary artery disease)     hx stents june 2020   • GERD (gastroesophageal reflux disease) Many years ago   • Pancreas (digestive gland) works poorly    • Skin cancer    • Sleep apnea      Past Surgical History:   Procedure Laterality Date   • BACK SURGERY     • CARDIAC CATHETERIZATION  06/2020   • CHOLECYSTECTOMY     • COLONOSCOPY  2020    NORMAL   • CORONARY ANGIOPLASTY WITH STENT PLACEMENT      TWICE   • FRACTURE SURGERY  Various   • HEMORRHOIDECTOMY     • PANCREAS SURGERY      STENTING   • SHOULDER SURGERY     • SINUS SURGERY  2022    Deviated septum   • TONSILLECTOMY     • TUMOR REMOVAL      Ro[place] Of Mouth     Social History     Socioeconomic History   • Marital status:    Tobacco Use   • Smoking status: Never   • Smokeless tobacco: Never   Vaping Use   • Vaping Use: Never used   Substance and Sexual Activity   •  "Alcohol use: Yes     Comment: Don’t drink weekly   • Drug use: No   • Sexual activity: Yes     Partners: Female     Birth control/protection: None     Family History   Problem Relation Age of Onset   • Hypertension Mother    • Leukemia Mother    • No Known Problems Father    • Hypertension Sister    • Heart disease Brother    • Cancer Brother         Colon cancer   • Cancer Brother         Prostate   • Cancer Brother         Lung cancer   • Cancer Brother         Lung cancer   • No Known Problems Maternal Aunt    • No Known Problems Maternal Uncle    • No Known Problems Paternal Aunt    • No Known Problems Paternal Uncle    • No Known Problems Maternal Grandmother    • No Known Problems Maternal Grandfather    • No Known Problems Paternal Grandmother    • No Known Problems Paternal Grandfather    • Cancer Other    • Anesthesia problems Neg Hx    • Broken bones Neg Hx    • Clotting disorder Neg Hx    • Collagen disease Neg Hx    • Diabetes Neg Hx    • Dislocations Neg Hx    • Osteoporosis Neg Hx    • Rheumatologic disease Neg Hx    • Scoliosis Neg Hx    • Severe sprains Neg Hx        XR Knee 3 View Left    Result Date: 11/3/2022    1. No acute fracture or dislocation.  2. Tricompartment osteoarthritis.      BETI PETERS MD       Electronically Signed and Approved By: BETI PETERS MD on 11/03/2022 at 13:21             XR Ankle 3+ View Right    Result Date: 11/3/2022   No acute findings.      BETI PETERS MD       Electronically Signed and Approved By: BETI PETERS MD on 11/03/2022 at 13:11             XR Chest PA & Lateral    Result Date: 8/22/2022    1. No acute cardiopulmonary disease.     BEAU GONZALEZ MD       Electronically Signed and Approved By: BEAU GONZALEZ MD on 8/22/2022 at 13:54                   Objective     Blood pressure 120/75, pulse 70, temperature 98.5 °F (36.9 °C), temperature source Temporal, resp. rate 19, height 155.4 cm (61.2\"), weight 103 kg (227 lb), SpO2 95 %.  Physical Exam  Vitals and nursing note " reviewed.   Constitutional:       Appearance: He is obese.   HENT:      Head: Normocephalic and atraumatic.      Nose: Nose normal.      Mouth/Throat:      Mouth: Mucous membranes are moist.      Pharynx: Oropharynx is clear.   Eyes:      Conjunctiva/sclera: Conjunctivae normal.      Pupils: Pupils are equal, round, and reactive to light.   Cardiovascular:      Rate and Rhythm: Normal rate and regular rhythm.      Pulses: Normal pulses.      Heart sounds: Normal heart sounds.   Pulmonary:      Effort: Pulmonary effort is normal.      Breath sounds: Normal breath sounds.   Abdominal:      General: Abdomen is flat. Bowel sounds are normal.      Palpations: Abdomen is soft.   Musculoskeletal:         General: Normal range of motion.      Cervical back: Normal range of motion and neck supple.   Skin:     General: Skin is warm.      Capillary Refill: Capillary refill takes less than 2 seconds.   Neurological:      General: No focal deficit present.      Mental Status: He is alert and oriented to person, place, and time.   Psychiatric:         Mood and Affect: Mood normal.         Behavior: Behavior normal.       Immunization History   Administered Date(s) Administered   • COVID-19 (PFIZER) PURPLE CAP 02/24/2021, 03/17/2021, 10/06/2021   • Pneumococcal Conjugate 13-Valent (PCV13) 08/08/2016   • Pneumococcal Polysaccharide (PPSV23) 10/01/2018            Assessment & Plan     Diagnoses and all orders for this visit:    1. Mild intermittent asthma without complication (Primary)  -     XR Chest 2 View; Future  -     Full Pulmonary Function Test With Bronchodilator; Future    2. Observed sleep apnea (cannot tolerate CPAP)   -     XR Chest 2 View; Future  -     Full Pulmonary Function Test With Bronchodilator; Future    3. Coronary artery disease (stents)     4. GERD without esophagitis         Discussion/ Recommendations:   Patient is advised to reduce weight his body mass index is 42.61  Patient is advised albuterol inhaler  every 6 hours as needed for his asthma  We will order PFTs and chest x-ray for evaluation of his dyspnea    Discussed vaccination and recommended    Class 3 Severe Obesity (BMI >=40). Obesity-related health conditions include the following: obstructive sleep apnea. Obesity is unchanged. BMI is is above average; BMI management plan is completed. We discussed low calorie, low carb based diet program, portion control and increasing exercise.           Return in about 3 months (around 2/9/2023).      Thank you for allowing me to participate in the care of Karan Graham. Please do not hesitate to contact me with any questions.         This document has been electronically signed by Omsar Talavera MD on November 9, 2022 10:40 EST

## 2022-12-14 RX ORDER — MELOXICAM 7.5 MG/1
7.5 TABLET ORAL DAILY
Qty: 30 TABLET | Refills: 1 | OUTPATIENT
Start: 2022-12-14 | End: 2023-01-13

## 2022-12-30 RX ORDER — MELOXICAM 7.5 MG/1
TABLET ORAL
Qty: 30 TABLET | Refills: 1 | Status: SHIPPED | OUTPATIENT
Start: 2022-12-30 | End: 2023-03-29

## 2022-12-30 RX ORDER — MELOXICAM 7.5 MG/1
7.5 TABLET ORAL DAILY
COMMUNITY
Start: 2022-12-10 | End: 2023-01-04 | Stop reason: SDUPTHER

## 2022-12-30 NOTE — TELEPHONE ENCOUNTER
Pt said no he is not taking both.  He is not taking Celebrex. He is wanting the Meloxicam.   Celebrex removed from his med list.

## 2022-12-30 NOTE — TELEPHONE ENCOUNTER
Rx Refill Note  Requested Prescriptions     Pending Prescriptions Disp Refills   • meloxicam (MOBIC) 7.5 MG tablet [Pharmacy Med Name: MELOXICAM 7.5MG TABLETS] 30 tablet 1     Sig: TAKE 1 TABLET BY MOUTH DAILY      Last office visit with prescribing clinician: 11/3/2022   Last telemedicine visit with prescribing clinician: 5/8/2023   Next office visit with prescribing clinician: 5/8/2023  COMPREHENSIVE METABOLIC PANEL (04/04/2022 12:31)    Lmtrc to make sure he is not taking both the meloxicam and the Celebrex.     Aleja Lynn LPN  12/30/22, 09:06 EST

## 2023-01-04 ENCOUNTER — OFFICE VISIT (OUTPATIENT)
Dept: FAMILY MEDICINE CLINIC | Age: 73
End: 2023-01-04
Payer: MEDICARE

## 2023-01-04 VITALS
HEIGHT: 61 IN | SYSTOLIC BLOOD PRESSURE: 114 MMHG | BODY MASS INDEX: 41.99 KG/M2 | OXYGEN SATURATION: 100 % | DIASTOLIC BLOOD PRESSURE: 60 MMHG | WEIGHT: 222.4 LBS | HEART RATE: 70 BPM

## 2023-01-04 DIAGNOSIS — M54.50 ACUTE BILATERAL LOW BACK PAIN WITHOUT SCIATICA: Primary | ICD-10-CM

## 2023-01-04 PROCEDURE — 96372 THER/PROPH/DIAG INJ SC/IM: CPT | Performed by: NURSE PRACTITIONER

## 2023-01-04 PROCEDURE — 99213 OFFICE O/P EST LOW 20 MIN: CPT | Performed by: NURSE PRACTITIONER

## 2023-01-04 PROCEDURE — 1159F MED LIST DOCD IN RCRD: CPT | Performed by: NURSE PRACTITIONER

## 2023-01-04 RX ORDER — TRIAMCINOLONE ACETONIDE 40 MG/ML
60 INJECTION, SUSPENSION INTRA-ARTICULAR; INTRAMUSCULAR ONCE
Status: COMPLETED | OUTPATIENT
Start: 2023-01-04 | End: 2023-01-04

## 2023-01-04 RX ORDER — CYCLOBENZAPRINE HCL 10 MG
10 TABLET ORAL 3 TIMES DAILY PRN
Qty: 60 TABLET | Refills: 1 | Status: SHIPPED | OUTPATIENT
Start: 2023-01-04

## 2023-01-04 RX ORDER — TRAMADOL HYDROCHLORIDE 50 MG/1
50 TABLET ORAL EVERY 6 HOURS PRN
Qty: 10 TABLET | Refills: 0 | Status: SHIPPED | OUTPATIENT
Start: 2023-01-04 | End: 2023-02-08 | Stop reason: ALTCHOICE

## 2023-01-04 RX ADMIN — TRIAMCINOLONE ACETONIDE 60 MG: 40 INJECTION, SUSPENSION INTRA-ARTICULAR; INTRAMUSCULAR at 11:26

## 2023-01-04 NOTE — PROGRESS NOTES
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  He has been working on his tractor and lifting some of the associated equipment which is approximately 70 pounds.  This activity has been occurring for approximately 1 week.  2 or 3 days ago he woke and pretty moderate to severe back pain.  He denies any numbness or tingling or radiation of the pain which is midline.  He is having muscle spasms.  Already had meloxicam 7.5 mg tablets to take daily as needed for any type of pain.  He also restarted Flexeril 10 mg tablets 3 times daily as needed.  Improvement thus far has been minimal.  He did have a leftover hydrocodone tablet at home last night which he took at bedtime.  He denies any urinary symptoms.  He reports pain is constant.  Last low back imaging was MRI lumbar spine approximately 18 months ago per [facility].  Arthritis was noted and he was sent to pain management.  Mom back will hurt intermittently things are much better until the last 1 week.  He denies any fall or injury.     Objective   Vital Signs:   Vitals:    01/04/23 1046   BP: 114/60   BP Location: Left arm   Patient Position: Sitting   Cuff Size: Large Adult   Pulse: 70   SpO2: 100%   Weight: 101 kg (222 lb 6.4 oz)   Height: 155.4 cm (61.2\")   PainSc:   9   PainLoc: Back      Body mass index is 41.75 kg/m².  Physical Exam  Vitals reviewed.   Constitutional:       General: He is not in acute distress.     Appearance: Normal appearance. He is well-developed.   Cardiovascular:      Rate and Rhythm: Normal rate and regular rhythm.      Heart sounds: Normal heart sounds.   Pulmonary:      Effort: Pulmonary effort is normal.      Breath sounds: Normal breath  sounds.   Musculoskeletal:      Right lower leg: No edema.      Left lower leg: No edema.      Comments: Walking with a cane and finding difficulty achieving a comfortable position in the exam room.  Pain with extension and flexion of his low back.   Skin:     General: Skin is warm and dry.   Neurological:      General: No focal deficit present.      Mental Status: He is alert.   Psychiatric:         Attention and Perception: Attention normal.         Mood and Affect: Mood and affect normal.         Behavior: Behavior normal.           Current Outpatient Medications:   •  albuterol sulfate  (90 Base) MCG/ACT inhaler, INHALE 2 PUFFS EVERY 6 HOURS AS NEEDED FOR SHORTNESS OF BREATH, Disp: , Rfl:   •  aspirin 81 MG chewable tablet, Chew 81 mg Daily., Disp: , Rfl:   •  atorvastatin (LIPITOR) 80 MG tablet, Take 80 mg by mouth Every Night., Disp: , Rfl:   •  clopidogrel (PLAVIX) 75 MG tablet, Take 1 tablet by mouth Daily., Disp: , Rfl:   •  cyclobenzaprine (FLEXERIL) 10 MG tablet, Take 1 tablet by mouth 3 (Three) Times a Day As Needed for Muscle Spasms., Disp: 60 tablet, Rfl: 1  •  Loratadine 10 MG capsule, Take  by mouth., Disp: , Rfl:   •  melatonin 1 MG tablet, Take  by mouth., Disp: , Rfl:   •  meloxicam (MOBIC) 7.5 MG tablet, TAKE 1 TABLET BY MOUTH DAILY, Disp: 30 tablet, Rfl: 1  •  metoprolol succinate XL (TOPROL-XL) 25 MG 24 hr tablet, Take 12.5 mg by mouth Daily., Disp: , Rfl:   •  omeprazole (priLOSEC) 20 MG capsule, Take 20 mg by mouth Daily., Disp: , Rfl:   •  tamsulosin (FLOMAX) 0.4 MG capsule 24 hr capsule, Take 1 capsule by mouth 2 (Two) Times a Day., Disp: , Rfl:   •  triamcinolone (KENALOG) 0.1 % ointment, Apply 1 application topically to the appropriate area as directed 2 (Two) Times a Day., Disp: 30 g, Rfl: 2  •  nitroglycerin (NITROSTAT) 0.4 MG SL tablet, Place 0.4 mg under the tongue Every 5 (Five) Minutes As Needed for Chest Pain. Take no more than 3 doses in 15 minutes., Disp: , Rfl:   •   traMADol (ULTRAM) 50 MG tablet, Take 1 tablet by mouth Every 6 (Six) Hours As Needed for Moderate Pain., Disp: 10 tablet, Rfl: 0  No current facility-administered medications for this visit.       Result Review :         CBC    CBC 4/4/22 8/12/22   WBC 6.24 6.22   RBC 4.43 (A) 4.45   Hemoglobin 13.5 13.4   Hematocrit 40.3 (A) 41.6   MCV 91.0 93.5   MCH 30.5 30.1   MCHC 33.5 32.2   RDW 12.6 13.0   Platelets 157 154   (A) Abnormal value            CBC w/diff    CBC w/Diff 4/4/22 8/12/22   WBC 6.24 6.22   RBC 4.43 (A) 4.45   Hemoglobin 13.5 13.4   Hematocrit 40.3 (A) 41.6   MCV 91.0 93.5   MCH 30.5 30.1   MCHC 33.5 32.2   RDW 12.6 13.0   Platelets 157 154   Neutrophil Rel % 59.5    Immature Granulocyte Rel % 0.3    Lymphocyte Rel % 26.6    Monocyte Rel % 11.2    Eosinophil Rel % 1.6    Basophil Rel % 0.8    (A) Abnormal value            Lipid Panel    Lipid Panel 8/12/22   Total Cholesterol 95   Triglycerides 111   HDL Cholesterol 31 (A)   VLDL Cholesterol 21   LDL Cholesterol  43   LDL/HDL Ratio 1.35   (A) Abnormal value                     Assessment and Plan    Diagnoses and all orders for this visit:    1. Acute bilateral low back pain without sciatica (Primary)  Assessment & Plan:  Denies any injury.  Continue conservative measures such as application of cold or heat, stretches he learned previously from physical therapy, muscle relaxer, anti-inflammatory.  Due to extent of current pain he feels as if having a pain medication on hand for more severe pain would be beneficial for the next few days.  Tramadol has provided short-term.  Recommend updated imaging of the low back if not improving and potential referral to physical therapy for further evaluation.    Orders:  -     triamcinolone acetonide (KENALOG-40) injection 60 mg  -     cyclobenzaprine (FLEXERIL) 10 MG tablet; Take 1 tablet by mouth 3 (Three) Times a Day As Needed for Muscle Spasms.  Dispense: 60 tablet; Refill: 1  -     traMADol (ULTRAM) 50 MG tablet;  Take 1 tablet by mouth Every 6 (Six) Hours As Needed for Moderate Pain.  Dispense: 10 tablet; Refill: 0      Follow Up    No follow-ups on file.  Patient was given instructions and counseling regarding his condition or for health maintenance advice. Please see specific information pulled into the AVS if appropriate.

## 2023-01-04 NOTE — ASSESSMENT & PLAN NOTE
Denies any injury.  Continue conservative measures such as application of cold or heat, stretches he learned previously from physical therapy, muscle relaxer, anti-inflammatory.  Due to extent of current pain he feels as if having a pain medication on hand for more severe pain would be beneficial for the next few days.  Tramadol has provided short-term.  Recommend updated imaging of the low back if not improving and potential referral to physical therapy for further evaluation.

## 2023-02-08 ENCOUNTER — OFFICE VISIT (OUTPATIENT)
Dept: CARDIOLOGY | Facility: CLINIC | Age: 73
End: 2023-02-08
Payer: MEDICARE

## 2023-02-08 ENCOUNTER — HOSPITAL ENCOUNTER (OUTPATIENT)
Dept: CARDIOLOGY | Facility: HOSPITAL | Age: 73
Discharge: HOME | End: 2023-02-08
Admitting: NURSE PRACTITIONER
Payer: MEDICARE

## 2023-02-08 VITALS
OXYGEN SATURATION: 98 % | SYSTOLIC BLOOD PRESSURE: 130 MMHG | BODY MASS INDEX: 40.97 KG/M2 | DIASTOLIC BLOOD PRESSURE: 70 MMHG | WEIGHT: 217 LBS | HEART RATE: 68 BPM | HEIGHT: 61 IN

## 2023-02-08 DIAGNOSIS — R06.09 DYSPNEA ON EXERTION: ICD-10-CM

## 2023-02-08 DIAGNOSIS — R60.0 LOWER EXTREMITY EDEMA: ICD-10-CM

## 2023-02-08 DIAGNOSIS — I25.10 CORONARY ARTERY DISEASE INVOLVING NATIVE CORONARY ARTERY OF NATIVE HEART WITHOUT ANGINA PECTORIS: ICD-10-CM

## 2023-02-08 DIAGNOSIS — I25.10 CORONARY ARTERY DISEASE INVOLVING NATIVE CORONARY ARTERY OF NATIVE HEART WITHOUT ANGINA PECTORIS: Primary | ICD-10-CM

## 2023-02-08 LAB
ALBUMIN SERPL-MCNC: 4.4 G/DL (ref 3.5–5.2)
ALBUMIN/GLOB SERPL: 2 G/DL
ALP SERPL-CCNC: 90 U/L (ref 39–117)
ALT SERPL W P-5'-P-CCNC: 24 U/L (ref 1–41)
ANION GAP SERPL CALCULATED.3IONS-SCNC: 5 MMOL/L (ref 5–15)
AST SERPL-CCNC: 21 U/L (ref 1–40)
BILIRUB SERPL-MCNC: 1 MG/DL (ref 0–1.2)
BUN SERPL-MCNC: 12 MG/DL (ref 8–23)
BUN/CREAT SERPL: 13.3 (ref 7–25)
CALCIUM SPEC-SCNC: 9.2 MG/DL (ref 8.6–10.5)
CHLORIDE SERPL-SCNC: 105 MMOL/L (ref 98–107)
CO2 SERPL-SCNC: 28 MMOL/L (ref 22–29)
CREAT SERPL-MCNC: 0.9 MG/DL (ref 0.76–1.27)
DEPRECATED RDW RBC AUTO: 40.2 FL (ref 37–54)
EGFRCR SERPLBLD CKD-EPI 2021: 91.9 ML/MIN/1.73
ERYTHROCYTE [DISTWIDTH] IN BLOOD BY AUTOMATED COUNT: 12.6 % (ref 12.3–15.4)
GLOBULIN UR ELPH-MCNC: 2.2 GM/DL
GLUCOSE SERPL-MCNC: 92 MG/DL (ref 65–99)
HCT VFR BLD AUTO: 40.2 % (ref 37.5–51)
HGB BLD-MCNC: 14.1 G/DL (ref 13–17.7)
MCH RBC QN AUTO: 30.7 PG (ref 26.6–33)
MCHC RBC AUTO-ENTMCNC: 35.1 G/DL (ref 31.5–35.7)
MCV RBC AUTO: 87.6 FL (ref 79–97)
NT-PROBNP SERPL-MCNC: 28.7 PG/ML (ref 0–900)
PLATELET # BLD AUTO: 154 10*3/MM3 (ref 140–450)
PMV BLD AUTO: 10.9 FL (ref 6–12)
POTASSIUM SERPL-SCNC: 4.4 MMOL/L (ref 3.5–5.2)
PROT SERPL-MCNC: 6.6 G/DL (ref 6–8.5)
RBC # BLD AUTO: 4.59 10*6/MM3 (ref 4.14–5.8)
SODIUM SERPL-SCNC: 138 MMOL/L (ref 136–145)
WBC NRBC COR # BLD: 6.3 10*3/MM3 (ref 3.4–10.8)

## 2023-02-08 PROCEDURE — 99214 OFFICE O/P EST MOD 30 MIN: CPT | Performed by: NURSE PRACTITIONER

## 2023-02-08 PROCEDURE — 83880 ASSAY OF NATRIURETIC PEPTIDE: CPT | Performed by: NURSE PRACTITIONER

## 2023-02-08 PROCEDURE — 36415 COLL VENOUS BLD VENIPUNCTURE: CPT

## 2023-02-08 PROCEDURE — 93000 ELECTROCARDIOGRAM COMPLETE: CPT | Performed by: NURSE PRACTITIONER

## 2023-02-08 PROCEDURE — 80053 COMPREHEN METABOLIC PANEL: CPT | Performed by: NURSE PRACTITIONER

## 2023-02-08 PROCEDURE — 85027 COMPLETE CBC AUTOMATED: CPT | Performed by: NURSE PRACTITIONER

## 2023-02-08 NOTE — PROGRESS NOTES
Please let him know his labs are stable. He has normal kidney function and electrolytes. His blood counts are stable and he is not anemic. His heart failure marker is normal.

## 2023-02-08 NOTE — H&P (VIEW-ONLY)
[facility] Follow Up Office Note     Encounter Date:23  Patient:Karan Graham  :1950  MRN:[id number]      Chief Complaint: No chief complaint on file.    History of Presenting Illness:        Karan Graham is a 72 y.o. male who is here for follow-up.  He is a patient of Dr Pedroza.    Patient has past medical history significant for CAD with prior PCI, mixed hyperlipidemia, REGULO not on CPAP, GERD, easy bruising, cavernous sinus tumor.      Patient was previously treated at [facility] for CAD and established care with Dr Pedroza in 2022 when he was experiencing exertional dyspnea.  This was his previous anginal equivalent. Stress PET 2022 was normal.  Echo also with normal LVEF and no significant valvular abnormalities.      He has seen pulmonary for shortness of breath and was started on additional neb for asthma. CXR and PFTs ordered which have not been done yet.    Patient is in to see me today ahead of his previously scheduled appointment because he is concerned about the development of chest pains over the past couple of weeks.  2 weeks ago the patient had an episode of very sharp midsternal chest pain that lasted for about 20 minutes associated with shortness of breath.  He was driving his tractor at the time, and had to stop and lay down on the ground.  Since this time, he has had several episodes of chest tightness and feeling like he can't breathe that remind him of when he had previous stents.  These have not been associated with exertion.  These have been relieved with sublingual nitroglycerin.  The patient has had worsening dyspnea with exertion over the last 6 months.  At baseline, he does not do much exertional activity, partly due to issues with osteoarthritis.  He is taking daily aspirin, Plavix, and meloxicam.  He denies dark or bloody stools.  He also reports fatigue.  Patient denies palpitations, has intermittent mild lower extremity edema but denies orthopnea.       Review of Systems:  Review of Systems   Constitutional: Positive for fever.   Cardiovascular: Positive for chest pain and dyspnea on exertion. Negative for leg swelling, orthopnea and palpitations.       Current Outpatient Medications on File Prior to Visit   Medication Sig Dispense Refill   • albuterol sulfate  (90 Base) MCG/ACT inhaler INHALE 2 PUFFS EVERY 6 HOURS AS NEEDED FOR SHORTNESS OF BREATH     • aspirin 81 MG chewable tablet Chew 81 mg Daily.     • atorvastatin (LIPITOR) 80 MG tablet Take 80 mg by mouth Every Night.     • clopidogrel (PLAVIX) 75 MG tablet Take 1 tablet by mouth Daily.     • cyclobenzaprine (FLEXERIL) 10 MG tablet Take 1 tablet by mouth 3 (Three) Times a Day As Needed for Muscle Spasms. 60 tablet 1   • Loratadine 10 MG capsule Take  by mouth.     • melatonin 1 MG tablet Take  by mouth.     • meloxicam (MOBIC) 7.5 MG tablet TAKE 1 TABLET BY MOUTH DAILY 30 tablet 1   • metoprolol succinate XL (TOPROL-XL) 25 MG 24 hr tablet Take 12.5 mg by mouth Daily.     • nitroglycerin (NITROSTAT) 0.4 MG SL tablet Place 0.4 mg under the tongue Every 5 (Five) Minutes As Needed for Chest Pain. Take no more than 3 doses in 15 minutes.     • omeprazole (priLOSEC) 20 MG capsule Take 20 mg by mouth Daily.     • tamsulosin (FLOMAX) 0.4 MG capsule 24 hr capsule Take 1 capsule by mouth 2 (Two) Times a Day.     • triamcinolone (KENALOG) 0.1 % ointment Apply 1 application topically to the appropriate area as directed 2 (Two) Times a Day. 30 g 2   • [DISCONTINUED] traMADol (ULTRAM) 50 MG tablet Take 1 tablet by mouth Every 6 (Six) Hours As Needed for Moderate Pain. 10 tablet 0     No current facility-administered medications on file prior to visit.       Allergies   Allergen Reactions   • Codeine Diarrhea and Nausea And Vomiting   • Ibuprofen Nausea And Vomiting       Past Medical History:   Diagnosis Date   • Asthma    • CAD (coronary artery disease)     hx stents june 2020   • GERD (gastroesophageal reflux  disease) Many years ago   • Pancreas (digestive gland) works poorly    • Skin cancer    • Sleep apnea        Past Surgical History:   Procedure Laterality Date   • BACK SURGERY     • CARDIAC CATHETERIZATION  06/2020   • CHOLECYSTECTOMY     • COLONOSCOPY  2020    NORMAL   • CORONARY ANGIOPLASTY WITH STENT PLACEMENT      TWICE   • FRACTURE SURGERY  Various   • HEMORRHOIDECTOMY     • PANCREAS SURGERY      STENTING   • SHOULDER SURGERY     • SINUS SURGERY  2022    Deviated septum   • TONSILLECTOMY     • TUMOR REMOVAL      Roof Of Mouth       Social History     Socioeconomic History   • Marital status:    Tobacco Use   • Smoking status: Never   • Smokeless tobacco: Never   Vaping Use   • Vaping Use: Never used   Substance and Sexual Activity   • Alcohol use: Yes     Comment: Don’t drink weekly   • Drug use: No   • Sexual activity: Yes     Partners: Female     Birth control/protection: None       Family History   Problem Relation Age of Onset   • Hypertension Mother    • Leukemia Mother    • No Known Problems Father    • Hypertension Sister    • Heart disease Brother    • Cancer Brother         Colon cancer   • Cancer Brother         Prostate   • Cancer Brother         Lung cancer   • Cancer Brother         Lung cancer   • No Known Problems Maternal Aunt    • No Known Problems Maternal Uncle    • No Known Problems Paternal Aunt    • No Known Problems Paternal Uncle    • No Known Problems Maternal Grandmother    • No Known Problems Maternal Grandfather    • No Known Problems Paternal Grandmother    • No Known Problems Paternal Grandfather    • Cancer Other    • Anesthesia problems Neg Hx    • Broken bones Neg Hx    • Clotting disorder Neg Hx    • Collagen disease Neg Hx    • Diabetes Neg Hx    • Dislocations Neg Hx    • Osteoporosis Neg Hx    • Rheumatologic disease Neg Hx    • Scoliosis Neg Hx    • Severe sprains Neg Hx        The following portions of the patient's history were reviewed and updated as appropriate:  "allergies, current medications, past family history, past medical history, past social history, past surgical history and problem list.       Objective:       Vitals:    02/08/23 1119   BP: 130/70   Pulse: 68   SpO2: 98%   Weight: 98.4 kg (217 lb)   Height: 154.9 cm (61\")         Physical Exam:  Constitutional: Well appearing, no acute distress   HENT: Oropharynx clear and membrane moist  Eyes: Normal conjunctiva, no sclera icterus  Neck: Supple, no carotid bruit bilaterally  Cardiovascular: Regular rate and rhythm, No Murmur, No bilateral lower extremity edema  Pulmonary: Normal respiratory effort, normal lung sounds, no wheezing  Neurological: Alert and orient x 3  Skin: Warm, dry, no ecchymosis, no rash  Psych: Appropriate mood and affect. Normal judgment and insight         Lab Results   Component Value Date     09/09/2020     09/08/2020    K 3.5 09/09/2020    K 4.0 09/08/2020     09/09/2020     09/08/2020    CO2 25.4 09/09/2020    CO2 23.1 09/08/2020    BUN 7 (L) 09/09/2020    BUN 9 09/08/2020    CREATININE 0.78 09/09/2020    CREATININE 0.81 09/08/2020    EGFRIFNONA 99 09/09/2020    EGFRIFNONA 95 09/08/2020    GLUCOSE 98 09/09/2020    GLUCOSE 88 09/08/2020    CALCIUM 9.0 09/09/2020    CALCIUM 9.1 09/08/2020    ALBUMIN 4.00 09/08/2020    ALBUMIN 4.4 08/09/2019    BILITOT 0.9 09/08/2020    BILITOT 0.8 08/09/2019    AST 18 09/08/2020    AST 27 08/09/2019    ALT 16 09/08/2020    ALT 33 08/09/2019     Lab Results   Component Value Date    WBC 6.22 08/12/2022    WBC 6.24 04/04/2022    HGB 13.4 08/12/2022    HGB 13.5 04/04/2022    HCT 41.6 08/12/2022    HCT 40.3 (L) 04/04/2022    MCV 93.5 08/12/2022    MCV 91.0 04/04/2022     08/12/2022     04/04/2022     Lab Results   Component Value Date    CHOL 95 08/12/2022    CHOL 77 10/19/2020    TRIG 111 08/12/2022    TRIG 83 10/19/2020    HDL 31 (L) 08/12/2022    HDL 35 (L) 10/19/2020    LDL 43 08/12/2022    LDL 25 10/19/2020     Lab " Results   Component Value Date    PROBNP 34.7 03/23/2017    PROBNP 35.2 03/22/2017    BNP <11.1 08/09/2019     Lab Results   Component Value Date    CKTOTAL 53 03/22/2017    CKMB 1.32 03/22/2017    TROPONINI <0.012 08/09/2019    TROPONINT <0.010 03/23/2017     Lab Results   Component Value Date    TSH 1.190 08/12/2022    TSH 0.776 03/23/2017           ECG 12 Lead    Date/Time: 2/8/2023 11:26 AM  Performed by: Aiyana Metcalf APRN  Authorized by: Aiyana Metcalf APRN   Comparison: compared with previous ECG from 10/24/2022  Similar to previous ECG  Rhythm: sinus rhythm  Rate: normal  Conduction: right bundle branch block          Cardiac stress test 9/22/2022:  • Myocardial perfusion imaging indicates a normal myocardial perfusion study with no evidence of ischemia.  • Left ventricular ejection fraction is hyperdynamic (Calculated EF > 70%). .  • Impressions are consistent with a low risk study.     Echocardiogram 9/22/2022:  • Calculated left ventricular EF = 68.4%. Left ventricular systolic function is normal.All left ventricular wall segments contract normally.Normal Global longitudinal LV strain (GLS) = -23.2%.  • Left ventricular diastolic function was normal.     Stent card 8/13/2020:  • Xience RALEIGH 2.5 x 23 mm placed to distal RCA     Stent card 6/26/2020:  • Promus RALEIGH placed LAD Dr. Cox at [facility]     Echocardiogram 6/22/2018 [facility]:  • Overall left ventricular function is normal. The estimated ejection fraction is 60-65%.   • Trace-to-mild mitral regurgitation is present.   • Patent foramen ovale.      Carotid duplex 6/21/2018 [facility]:  • Bilateral internal carotid arteries with plaque but <50% stenosis.  • Bilateral vertebral arteries with antegrade flow.  • No prior examination for comparison.         Assessment:          Diagnosis Plan   1. Coronary artery disease involving native coronary artery of native heart without angina pectoris  ECG 12 Lead    CBC (No Diff)     Comprehensive Metabolic Panel    Case Request Cath Lab: Left Heart Cath      2. Lower extremity edema        3. Dyspnea on exertion  proBNP             Plan:       Coronary artery disease with stable angina  · Hx of prior RALEIGH x2 at [facility], films not available  · As above, patient describes episodes of chest tightness as well as progressive dyspnea with exertion  · Continue aspirin, clopidogrel, metoprolol succinate. BP is low normal at baseline 110/60s and he has chronic orthostasis limiting further medical management  · I discussed case with Dr Pedroza and we will proceed with left heart catheterization. I discussed this at length with patient at his appointment  · We will check labs today    Essential hypertension  · BP controlled    Mixed hyperlipidemia  · Continue statin therapy  · LDL at goal    Patient is reporting chest pains and progressive dyspnea.  He had normal PET stress in September 2022.  His blood pressure does not have room for further titration of medical therapy.  I discussed with Dr. Pedroza and we are going to proceed with outpatient left heart catheterization.  I discussed plan at length with the patient and his wife today. We will check a set of labs today.  I will plan to follow-up with the patient 1 week after left heart cath    Orders Placed This Encounter   Procedures   • CBC (No Diff)     Standing Status:   Future     Number of Occurrences:   1     Standing Expiration Date:   2/8/2024     Order Specific Question:   Release to patient     Answer:   Routine Release   • Comprehensive Metabolic Panel     Standing Status:   Future     Number of Occurrences:   1     Standing Expiration Date:   2/8/2024     Order Specific Question:   Release to patient     Answer:   Routine Release   • proBNP     Standing Status:   Future     Number of Occurrences:   1     Standing Expiration Date:   2/8/2024     Order Specific Question:   Release to patient     Answer:   Routine Release   • ECG 12 Lead      This order was created via procedure documentation     Order Specific Question:   Release to patient     Answer:   Routine Release            AMINAH Kimble  [facility]  02/08/23  12:45 EST

## 2023-02-08 NOTE — PROGRESS NOTES
[facility] Follow Up Office Note     Encounter Date:23  Patient:Karan Graham  :1950  MRN:[id number]      Chief Complaint: No chief complaint on file.    History of Presenting Illness:        Karan Graham is a 72 y.o. male who is here for follow-up.  He is a patient of Dr Pedroza.    Patient has past medical history significant for CAD with prior PCI, mixed hyperlipidemia, REGULO not on CPAP, GERD, easy bruising, cavernous sinus tumor.      Patient was previously treated at [facility] for CAD and established care with Dr Pedroza in 2022 when he was experiencing exertional dyspnea.  This was his previous anginal equivalent. Stress PET 2022 was normal.  Echo also with normal LVEF and no significant valvular abnormalities.      He has seen pulmonary for shortness of breath and was started on additional neb for asthma. CXR and PFTs ordered which have not been done yet.    Patient is in to see me today ahead of his previously scheduled appointment because he is concerned about the development of chest pains over the past couple of weeks.  2 weeks ago the patient had an episode of very sharp midsternal chest pain that lasted for about 20 minutes associated with shortness of breath.  He was driving his tractor at the time, and had to stop and lay down on the ground.  Since this time, he has had several episodes of chest tightness and feeling like he can't breathe that remind him of when he had previous stents.  These have not been associated with exertion.  These have been relieved with sublingual nitroglycerin.  The patient has had worsening dyspnea with exertion over the last 6 months.  At baseline, he does not do much exertional activity, partly due to issues with osteoarthritis.  He is taking daily aspirin, Plavix, and meloxicam.  He denies dark or bloody stools.  He also reports fatigue.  Patient denies palpitations, has intermittent mild lower extremity edema but denies orthopnea.       Review of Systems:  Review of Systems   Constitutional: Positive for fever.   Cardiovascular: Positive for chest pain and dyspnea on exertion. Negative for leg swelling, orthopnea and palpitations.       Current Outpatient Medications on File Prior to Visit   Medication Sig Dispense Refill   • albuterol sulfate  (90 Base) MCG/ACT inhaler INHALE 2 PUFFS EVERY 6 HOURS AS NEEDED FOR SHORTNESS OF BREATH     • aspirin 81 MG chewable tablet Chew 81 mg Daily.     • atorvastatin (LIPITOR) 80 MG tablet Take 80 mg by mouth Every Night.     • clopidogrel (PLAVIX) 75 MG tablet Take 1 tablet by mouth Daily.     • cyclobenzaprine (FLEXERIL) 10 MG tablet Take 1 tablet by mouth 3 (Three) Times a Day As Needed for Muscle Spasms. 60 tablet 1   • Loratadine 10 MG capsule Take  by mouth.     • melatonin 1 MG tablet Take  by mouth.     • meloxicam (MOBIC) 7.5 MG tablet TAKE 1 TABLET BY MOUTH DAILY 30 tablet 1   • metoprolol succinate XL (TOPROL-XL) 25 MG 24 hr tablet Take 12.5 mg by mouth Daily.     • nitroglycerin (NITROSTAT) 0.4 MG SL tablet Place 0.4 mg under the tongue Every 5 (Five) Minutes As Needed for Chest Pain. Take no more than 3 doses in 15 minutes.     • omeprazole (priLOSEC) 20 MG capsule Take 20 mg by mouth Daily.     • tamsulosin (FLOMAX) 0.4 MG capsule 24 hr capsule Take 1 capsule by mouth 2 (Two) Times a Day.     • triamcinolone (KENALOG) 0.1 % ointment Apply 1 application topically to the appropriate area as directed 2 (Two) Times a Day. 30 g 2   • [DISCONTINUED] traMADol (ULTRAM) 50 MG tablet Take 1 tablet by mouth Every 6 (Six) Hours As Needed for Moderate Pain. 10 tablet 0     No current facility-administered medications on file prior to visit.       Allergies   Allergen Reactions   • Codeine Diarrhea and Nausea And Vomiting   • Ibuprofen Nausea And Vomiting       Past Medical History:   Diagnosis Date   • Asthma    • CAD (coronary artery disease)     hx stents june 2020   • GERD (gastroesophageal reflux  disease) Many years ago   • Pancreas (digestive gland) works poorly    • Skin cancer    • Sleep apnea        Past Surgical History:   Procedure Laterality Date   • BACK SURGERY     • CARDIAC CATHETERIZATION  06/2020   • CHOLECYSTECTOMY     • COLONOSCOPY  2020    NORMAL   • CORONARY ANGIOPLASTY WITH STENT PLACEMENT      TWICE   • FRACTURE SURGERY  Various   • HEMORRHOIDECTOMY     • PANCREAS SURGERY      STENTING   • SHOULDER SURGERY     • SINUS SURGERY  2022    Deviated septum   • TONSILLECTOMY     • TUMOR REMOVAL      Roof Of Mouth       Social History     Socioeconomic History   • Marital status:    Tobacco Use   • Smoking status: Never   • Smokeless tobacco: Never   Vaping Use   • Vaping Use: Never used   Substance and Sexual Activity   • Alcohol use: Yes     Comment: Don’t drink weekly   • Drug use: No   • Sexual activity: Yes     Partners: Female     Birth control/protection: None       Family History   Problem Relation Age of Onset   • Hypertension Mother    • Leukemia Mother    • No Known Problems Father    • Hypertension Sister    • Heart disease Brother    • Cancer Brother         Colon cancer   • Cancer Brother         Prostate   • Cancer Brother         Lung cancer   • Cancer Brother         Lung cancer   • No Known Problems Maternal Aunt    • No Known Problems Maternal Uncle    • No Known Problems Paternal Aunt    • No Known Problems Paternal Uncle    • No Known Problems Maternal Grandmother    • No Known Problems Maternal Grandfather    • No Known Problems Paternal Grandmother    • No Known Problems Paternal Grandfather    • Cancer Other    • Anesthesia problems Neg Hx    • Broken bones Neg Hx    • Clotting disorder Neg Hx    • Collagen disease Neg Hx    • Diabetes Neg Hx    • Dislocations Neg Hx    • Osteoporosis Neg Hx    • Rheumatologic disease Neg Hx    • Scoliosis Neg Hx    • Severe sprains Neg Hx        The following portions of the patient's history were reviewed and updated as appropriate:  "allergies, current medications, past family history, past medical history, past social history, past surgical history and problem list.       Objective:       Vitals:    02/08/23 1119   BP: 130/70   Pulse: 68   SpO2: 98%   Weight: 98.4 kg (217 lb)   Height: 154.9 cm (61\")         Physical Exam:  Constitutional: Well appearing, no acute distress   HENT: Oropharynx clear and membrane moist  Eyes: Normal conjunctiva, no sclera icterus  Neck: Supple, no carotid bruit bilaterally  Cardiovascular: Regular rate and rhythm, No Murmur, No bilateral lower extremity edema  Pulmonary: Normal respiratory effort, normal lung sounds, no wheezing  Neurological: Alert and orient x 3  Skin: Warm, dry, no ecchymosis, no rash  Psych: Appropriate mood and affect. Normal judgment and insight         Lab Results   Component Value Date     09/09/2020     09/08/2020    K 3.5 09/09/2020    K 4.0 09/08/2020     09/09/2020     09/08/2020    CO2 25.4 09/09/2020    CO2 23.1 09/08/2020    BUN 7 (L) 09/09/2020    BUN 9 09/08/2020    CREATININE 0.78 09/09/2020    CREATININE 0.81 09/08/2020    EGFRIFNONA 99 09/09/2020    EGFRIFNONA 95 09/08/2020    GLUCOSE 98 09/09/2020    GLUCOSE 88 09/08/2020    CALCIUM 9.0 09/09/2020    CALCIUM 9.1 09/08/2020    ALBUMIN 4.00 09/08/2020    ALBUMIN 4.4 08/09/2019    BILITOT 0.9 09/08/2020    BILITOT 0.8 08/09/2019    AST 18 09/08/2020    AST 27 08/09/2019    ALT 16 09/08/2020    ALT 33 08/09/2019     Lab Results   Component Value Date    WBC 6.22 08/12/2022    WBC 6.24 04/04/2022    HGB 13.4 08/12/2022    HGB 13.5 04/04/2022    HCT 41.6 08/12/2022    HCT 40.3 (L) 04/04/2022    MCV 93.5 08/12/2022    MCV 91.0 04/04/2022     08/12/2022     04/04/2022     Lab Results   Component Value Date    CHOL 95 08/12/2022    CHOL 77 10/19/2020    TRIG 111 08/12/2022    TRIG 83 10/19/2020    HDL 31 (L) 08/12/2022    HDL 35 (L) 10/19/2020    LDL 43 08/12/2022    LDL 25 10/19/2020     Lab " Results   Component Value Date    PROBNP 34.7 03/23/2017    PROBNP 35.2 03/22/2017    BNP <11.1 08/09/2019     Lab Results   Component Value Date    CKTOTAL 53 03/22/2017    CKMB 1.32 03/22/2017    TROPONINI <0.012 08/09/2019    TROPONINT <0.010 03/23/2017     Lab Results   Component Value Date    TSH 1.190 08/12/2022    TSH 0.776 03/23/2017           ECG 12 Lead    Date/Time: 2/8/2023 11:26 AM  Performed by: Aiyana Metcalf APRN  Authorized by: Aiyana Metcalf APRN   Comparison: compared with previous ECG from 10/24/2022  Similar to previous ECG  Rhythm: sinus rhythm  Rate: normal  Conduction: right bundle branch block          Cardiac stress test 9/22/2022:  • Myocardial perfusion imaging indicates a normal myocardial perfusion study with no evidence of ischemia.  • Left ventricular ejection fraction is hyperdynamic (Calculated EF > 70%). .  • Impressions are consistent with a low risk study.     Echocardiogram 9/22/2022:  • Calculated left ventricular EF = 68.4%. Left ventricular systolic function is normal.All left ventricular wall segments contract normally.Normal Global longitudinal LV strain (GLS) = -23.2%.  • Left ventricular diastolic function was normal.     Stent card 8/13/2020:  • Xience RALEIGH 2.5 x 23 mm placed to distal RCA     Stent card 6/26/2020:  • Promus RALEIGH placed LAD Dr. Cox at [facility]     Echocardiogram 6/22/2018 [facility]:  • Overall left ventricular function is normal. The estimated ejection fraction is 60-65%.   • Trace-to-mild mitral regurgitation is present.   • Patent foramen ovale.      Carotid duplex 6/21/2018 [facility]:  • Bilateral internal carotid arteries with plaque but <50% stenosis.  • Bilateral vertebral arteries with antegrade flow.  • No prior examination for comparison.         Assessment:          Diagnosis Plan   1. Coronary artery disease involving native coronary artery of native heart without angina pectoris  ECG 12 Lead    CBC (No Diff)     Comprehensive Metabolic Panel    Case Request Cath Lab: Left Heart Cath      2. Lower extremity edema        3. Dyspnea on exertion  proBNP             Plan:       Coronary artery disease with stable angina  · Hx of prior RALEIGH x2 at [facility], films not available  · As above, patient describes episodes of chest tightness as well as progressive dyspnea with exertion  · Continue aspirin, clopidogrel, metoprolol succinate. BP is low normal at baseline 110/60s and he has chronic orthostasis limiting further medical management  · I discussed case with Dr Pedroza and we will proceed with left heart catheterization. I discussed this at length with patient at his appointment  · We will check labs today    Essential hypertension  · BP controlled    Mixed hyperlipidemia  · Continue statin therapy  · LDL at goal    Patient is reporting chest pains and progressive dyspnea.  He had normal PET stress in September 2022.  His blood pressure does not have room for further titration of medical therapy.  I discussed with Dr. Pedroza and we are going to proceed with outpatient left heart catheterization.  I discussed plan at length with the patient and his wife today. We will check a set of labs today.  I will plan to follow-up with the patient 1 week after left heart cath    Orders Placed This Encounter   Procedures   • CBC (No Diff)     Standing Status:   Future     Number of Occurrences:   1     Standing Expiration Date:   2/8/2024     Order Specific Question:   Release to patient     Answer:   Routine Release   • Comprehensive Metabolic Panel     Standing Status:   Future     Number of Occurrences:   1     Standing Expiration Date:   2/8/2024     Order Specific Question:   Release to patient     Answer:   Routine Release   • proBNP     Standing Status:   Future     Number of Occurrences:   1     Standing Expiration Date:   2/8/2024     Order Specific Question:   Release to patient     Answer:   Routine Release   • ECG 12 Lead      This order was created via procedure documentation     Order Specific Question:   Release to patient     Answer:   Routine Release            AMINAH Kimble  [facility]  02/08/23  12:45 EST

## 2023-02-09 NOTE — PROGRESS NOTES
Aiyana Metcalf, APRN   2/8/2023  3:32 PM EST Back to Top    Please let him know his labs are stable. He has normal kidney function and electrolytes. His blood counts are stable and he is not anemic. His heart failure marker is normal.    _____________________________________________________________________    Called Pt with results , Pt verbalized understanding .

## 2023-02-21 ENCOUNTER — HOSPITAL ENCOUNTER (OUTPATIENT)
Facility: HOSPITAL | Age: 73
Setting detail: HOSPITAL OUTPATIENT SURGERY
Discharge: HOME | End: 2023-02-21
Attending: INTERNAL MEDICINE | Admitting: INTERNAL MEDICINE
Payer: MEDICARE

## 2023-02-21 VITALS
TEMPERATURE: 97.8 F | HEART RATE: 73 BPM | HEIGHT: 71 IN | DIASTOLIC BLOOD PRESSURE: 62 MMHG | BODY MASS INDEX: 29.4 KG/M2 | RESPIRATION RATE: 16 BRPM | SYSTOLIC BLOOD PRESSURE: 121 MMHG | WEIGHT: 210 LBS | OXYGEN SATURATION: 96 %

## 2023-02-21 DIAGNOSIS — I25.10 CORONARY ARTERY DISEASE INVOLVING NATIVE CORONARY ARTERY OF NATIVE HEART WITHOUT ANGINA PECTORIS: ICD-10-CM

## 2023-02-21 PROCEDURE — C1894 INTRO/SHEATH, NON-LASER: HCPCS | Performed by: INTERNAL MEDICINE

## 2023-02-21 PROCEDURE — C1769 GUIDE WIRE: HCPCS | Performed by: INTERNAL MEDICINE

## 2023-02-21 PROCEDURE — 25010000002 HEPARIN (PORCINE) PER 1000 UNITS: Performed by: INTERNAL MEDICINE

## 2023-02-21 PROCEDURE — 0 IOPAMIDOL PER 1 ML: Performed by: INTERNAL MEDICINE

## 2023-02-21 PROCEDURE — 93458 L HRT ARTERY/VENTRICLE ANGIO: CPT | Performed by: INTERNAL MEDICINE

## 2023-02-21 PROCEDURE — 25010000002 MIDAZOLAM PER 1 MG: Performed by: INTERNAL MEDICINE

## 2023-02-21 PROCEDURE — 25010000002 FENTANYL CITRATE (PF) 50 MCG/ML SOLUTION: Performed by: INTERNAL MEDICINE

## 2023-02-21 RX ORDER — FENTANYL CITRATE 50 UG/ML
INJECTION, SOLUTION INTRAMUSCULAR; INTRAVENOUS
Status: DISCONTINUED | OUTPATIENT
Start: 2023-02-21 | End: 2023-02-21 | Stop reason: HOSPADM

## 2023-02-21 RX ORDER — SODIUM CHLORIDE 0.9 % (FLUSH) 0.9 %
10 SYRINGE (ML) INJECTION EVERY 12 HOURS SCHEDULED
Status: DISCONTINUED | OUTPATIENT
Start: 2023-02-21 | End: 2023-02-21 | Stop reason: HOSPADM

## 2023-02-21 RX ORDER — ISOSORBIDE MONONITRATE 30 MG/1
30 TABLET, EXTENDED RELEASE ORAL DAILY
Qty: 90 TABLET | Refills: 3 | OUTPATIENT
Start: 2023-02-21

## 2023-02-21 RX ORDER — SODIUM CHLORIDE 0.9 % (FLUSH) 0.9 %
10 SYRINGE (ML) INJECTION AS NEEDED
Status: DISCONTINUED | OUTPATIENT
Start: 2023-02-21 | End: 2023-02-21 | Stop reason: HOSPADM

## 2023-02-21 RX ORDER — ACETAMINOPHEN 325 MG/1
650 TABLET ORAL EVERY 4 HOURS PRN
Status: DISCONTINUED | OUTPATIENT
Start: 2023-02-21 | End: 2023-02-21 | Stop reason: HOSPADM

## 2023-02-21 RX ORDER — SODIUM CHLORIDE 9 MG/ML
100 INJECTION, SOLUTION INTRAVENOUS CONTINUOUS
Status: ACTIVE | OUTPATIENT
Start: 2023-02-21 | End: 2023-02-21

## 2023-02-21 RX ORDER — VERAPAMIL HYDROCHLORIDE 2.5 MG/ML
INJECTION, SOLUTION INTRAVENOUS
Status: DISCONTINUED | OUTPATIENT
Start: 2023-02-21 | End: 2023-02-21 | Stop reason: HOSPADM

## 2023-02-21 RX ORDER — ISOSORBIDE MONONITRATE 30 MG/1
30 TABLET, EXTENDED RELEASE ORAL DAILY
Qty: 90 TABLET | Refills: 3 | Status: SHIPPED | OUTPATIENT
Start: 2023-02-21 | End: 2023-03-07

## 2023-02-21 RX ORDER — SODIUM CHLORIDE 0.9 % (FLUSH) 0.9 %
3 SYRINGE (ML) INJECTION EVERY 12 HOURS SCHEDULED
Status: DISCONTINUED | OUTPATIENT
Start: 2023-02-21 | End: 2023-02-21 | Stop reason: HOSPADM

## 2023-02-21 RX ORDER — SODIUM CHLORIDE 9 MG/ML
40 INJECTION, SOLUTION INTRAVENOUS AS NEEDED
Status: DISCONTINUED | OUTPATIENT
Start: 2023-02-21 | End: 2023-02-21 | Stop reason: HOSPADM

## 2023-02-21 RX ORDER — HEPARIN SODIUM 1000 [USP'U]/ML
INJECTION, SOLUTION INTRAVENOUS; SUBCUTANEOUS
Status: DISCONTINUED | OUTPATIENT
Start: 2023-02-21 | End: 2023-02-21 | Stop reason: HOSPADM

## 2023-02-21 RX ORDER — MIDAZOLAM HYDROCHLORIDE 1 MG/ML
INJECTION INTRAMUSCULAR; INTRAVENOUS
Status: DISCONTINUED | OUTPATIENT
Start: 2023-02-21 | End: 2023-02-21 | Stop reason: HOSPADM

## 2023-02-21 RX ORDER — SODIUM CHLORIDE 9 MG/ML
75 INJECTION, SOLUTION INTRAVENOUS CONTINUOUS
Status: DISCONTINUED | OUTPATIENT
Start: 2023-02-21 | End: 2023-02-21 | Stop reason: HOSPADM

## 2023-02-21 RX ORDER — LIDOCAINE HYDROCHLORIDE 20 MG/ML
INJECTION, SOLUTION INFILTRATION; PERINEURAL
Status: DISCONTINUED | OUTPATIENT
Start: 2023-02-21 | End: 2023-02-21 | Stop reason: HOSPADM

## 2023-02-21 RX ADMIN — SODIUM CHLORIDE 75 ML/HR: 9 INJECTION, SOLUTION INTRAVENOUS at 11:18

## 2023-02-21 RX ADMIN — ACETAMINOPHEN 650 MG: 325 TABLET, FILM COATED ORAL at 13:39

## 2023-02-21 NOTE — DISCHARGE INSTRUCTIONS
[facility]  [address] [place] [address]    Coronary Angiogram (Radial/Ulnar Approach) After Care    Refer to this sheet in the next few weeks. These instructions provide you with information on caring for yourself after your procedure. Your caregiver may also give you more specific instructions. Your treatment has been planned according to current medical practices, but problems sometimes occur. Call your caregiver if you have any problems or questions after your procedure.    Home Care Instructions:  You may shower the day after the procedure. Remove the bandage (dressing) and gently wash the site with plain soap and water. Gently pat the site dry. You may apply a band aid daily for 2 days if desired.    Do not apply powder or lotion to the site.  Do not submerge the affected site in water for 3 to 5 days or until the site is completely healed.   Do not lift, push or pull anything over 5 pounds for 5 days after your procedure or as directed by your physician.  As a reference, a gallon of milk weighs 8 pounds.   Inspect the site at least twice daily. You may notice some bruising at the site and it may be tender for 1 to 2 weeks.     Increase your fluid intake for the next 2 days.    Keep arm elevated for 24 hours. For the remainder of the day, keep your arm in “Pledge of Allegiance” position when up and about.     You may drive 24 hours after the procedure unless otherwise instructed by your caregiver.  Do not operate machinery or power tools for 24 hours.  A responsible adult should be with you for the first 24 hours after you arrive home. Do not make any important legal decisions or sign legal papers for 24 hours.  Do not drink alcohol for 24 hours.    Metformin or any medications containing Metformin should not be taken for 48 hours after your procedure.      Call Your Doctor if:   You have unusual pain at the radial/ulnar (wrist) site.  You have redness, warmth, swelling, or pain at the  radial/ulnar (wrist) site.  You have drainage (other than a small amount of blood on the dressing).  `You have chills or a fever > 101.  Your arm becomes pale or dark, cool, tingly, or numb.  You develop chest pain, shortness of breath, feel faint or pass out.    You have heavy bleeding from the site, hold pressure on the site for 20 minutes.  If the bleeding stops, apply a fresh bandage and call your cardiologist.  However, if you        continue to have bleeding, call 911 and continue to apply pressure to the site.   You have any symptoms of a stroke.  Remember BE FAST  B-balance. Sudden trouble walking or loss of balance.  E-eyes.  Sudden changes in how you see or a sudden onset of a very bad headache.   F-face. Sudden weakness or loss of feeling of the face or facial droop on one side.   A-arms Sudden weakness or numbness in one arm.  One arm drifts down if they are both held out in front of you. This happens suddenly and usually on one side of the body.   S-speech.  Sudden trouble speaking, slurred speech or trouble understanding what are saying.   T-time  Time to call emergency services.  Write down the symptoms and the time they started.

## 2023-02-28 ENCOUNTER — TELEPHONE (OUTPATIENT)
Dept: FAMILY MEDICINE CLINIC | Age: 73
End: 2023-02-28
Payer: MEDICARE

## 2023-02-28 ENCOUNTER — OFFICE VISIT (OUTPATIENT)
Dept: FAMILY MEDICINE CLINIC | Age: 73
End: 2023-02-28
Payer: MEDICARE

## 2023-02-28 VITALS
BODY MASS INDEX: 30.66 KG/M2 | DIASTOLIC BLOOD PRESSURE: 72 MMHG | OXYGEN SATURATION: 98 % | HEART RATE: 69 BPM | SYSTOLIC BLOOD PRESSURE: 128 MMHG | HEIGHT: 71 IN | TEMPERATURE: 98.9 F | WEIGHT: 219 LBS

## 2023-02-28 DIAGNOSIS — J01.10 ACUTE NON-RECURRENT FRONTAL SINUSITIS: Primary | ICD-10-CM

## 2023-02-28 DIAGNOSIS — Z98.890 S/P CARDIAC CATH: ICD-10-CM

## 2023-02-28 DIAGNOSIS — R05.1 ACUTE COUGH: ICD-10-CM

## 2023-02-28 LAB
EXPIRATION DATE: NORMAL
FLUAV AG UPPER RESP QL IA.RAPID: NOT DETECTED
FLUBV AG UPPER RESP QL IA.RAPID: NOT DETECTED
INTERNAL CONTROL: NORMAL
Lab: NORMAL
SARS-COV-2 AG UPPER RESP QL IA.RAPID: NOT DETECTED

## 2023-02-28 PROCEDURE — 99213 OFFICE O/P EST LOW 20 MIN: CPT

## 2023-02-28 PROCEDURE — 87428 SARSCOV & INF VIR A&B AG IA: CPT

## 2023-02-28 RX ORDER — AMOXICILLIN AND CLAVULANATE POTASSIUM 875; 125 MG/1; MG/1
1 TABLET, FILM COATED ORAL 2 TIMES DAILY
Qty: 14 TABLET | Refills: 0 | Status: SHIPPED | OUTPATIENT
Start: 2023-02-28 | End: 2023-03-07

## 2023-02-28 NOTE — TELEPHONE ENCOUNTER
Consulted with Dr Light and he said v/s are good, he should see one of our acute care providers today or tomorrow.  He may just have a cold.   Pt informed.  Appt today with DEREK pastor.

## 2023-02-28 NOTE — TELEPHONE ENCOUNTER
Pt said he had a heart cath done a week ago by Dr Pulido in [place].  He had soa and chest pain and that's why he had one done.   Since then he has had a h/a for 3 days, his right wrist is sore when they entered for his cath, sore throat, fever a couple days ago.  He is concerned he may have got a infection from the cath.   B/p- 134/62, hr- 71, o2 sat- 98%, temp- 99.   Very small bruise noted, no firmness in the veins found. No soa, no chest pain.      Thank you !

## 2023-03-06 ENCOUNTER — OFFICE VISIT (OUTPATIENT)
Dept: FAMILY MEDICINE CLINIC | Age: 73
End: 2023-03-06
Payer: MEDICARE

## 2023-03-06 VITALS
HEART RATE: 82 BPM | BODY MASS INDEX: 30.66 KG/M2 | HEIGHT: 71 IN | WEIGHT: 219 LBS | TEMPERATURE: 98 F | OXYGEN SATURATION: 95 % | DIASTOLIC BLOOD PRESSURE: 77 MMHG | SYSTOLIC BLOOD PRESSURE: 132 MMHG

## 2023-03-06 DIAGNOSIS — M54.2 NECK PAIN: ICD-10-CM

## 2023-03-06 DIAGNOSIS — R51.9 ACUTE NONINTRACTABLE HEADACHE, UNSPECIFIED HEADACHE TYPE: Primary | ICD-10-CM

## 2023-03-06 DIAGNOSIS — D49.6 CAVERNOUS SINUS TUMOR: ICD-10-CM

## 2023-03-06 PROCEDURE — 99213 OFFICE O/P EST LOW 20 MIN: CPT

## 2023-03-06 NOTE — PROGRESS NOTES
"Subjective     CHIEF COMPLAINT    Chief Complaint   Patient presents with   • Headache     X 2 wks      History of Present Illness  Patient is a 72-year-old male who presents to the clinic today with complaints of headache.  He thinks that he may have hurt his neck.  About 2 weeks ago he reports that he got jerked on the tractor.  He was seen in clinic on 2- with sinusitis.  He did mention a headache at that time but did not mention any neck pain.Currently, his upper respiratory symptoms have improved.  He reports that his headaches however have gotten much worse.  He states that they are there all day long, he does take Tylenol which helps a little bit.  He describes the headaches as being a 9 out of 10 pain intermittently but sometimes less than that.  The pain is located to his temples as well as his neck.  He does not have a history of migraines.  He does say that he has never had headaches like this before and they do sometimes wake him from sleep because they are so painful.  He thinks he has some swelling in his temple area.  He describes the pain as \"just hurts.\"  Sometimes it hurts to lay his head on the pillow.    Patient mentions a history of a brain tumor.  Reports that this is benign.  Was being followed by Dr. Logan in [place] and was getting MRIs every 2 years.  Last MRI was 8-2021 and he was due again to get a repeat MRI this August.  They did not want to do any surgery on this due to the location being around a lot of nerves.  With patients permission, I spoke with patient's wife via telephone during our visit to obtain more information regarding this brain tumor.  Initially, patient was diagnosed with this about 5 to 6 years ago, was getting MRIs every 6 months and then every year and then now every 2 years.  She confirms that they reported this was a benign finding.      Review of Systems   Constitutional: Negative for chills and fever.   Eyes: Negative for visual disturbance. "   Respiratory: Positive for shortness of breath. Negative for wheezing.    Cardiovascular: Negative for chest pain.   Gastrointestinal: Negative for nausea and vomiting.   Neurological: Positive for headaches. Negative for dizziness, facial asymmetry, weakness, light-headedness and numbness.       Allergies   Allergen Reactions   • Codeine Diarrhea and Nausea And Vomiting   • Ibuprofen Nausea And Vomiting         Current Outpatient Medications on File Prior to Visit   Medication Sig Dispense Refill   • albuterol sulfate  (90 Base) MCG/ACT inhaler INHALE 2 PUFFS EVERY 6 HOURS AS NEEDED FOR SHORTNESS OF BREATH     • amoxicillin-clavulanate (Augmentin) 875-125 MG per tablet Take 1 tablet by mouth 2 (Two) Times a Day for 7 days. 14 tablet 0   • aspirin 81 MG chewable tablet Chew 1 tablet Daily.     • atorvastatin (LIPITOR) 80 MG tablet Take 1 tablet by mouth Every Night.     • clopidogrel (PLAVIX) 75 MG tablet Take 1 tablet by mouth Daily.     • cyclobenzaprine (FLEXERIL) 10 MG tablet Take 1 tablet by mouth 3 (Three) Times a Day As Needed for Muscle Spasms. 60 tablet 1   • guaiFENesin (MUCINEX PO) Take  by mouth.     • isosorbide mononitrate (IMDUR) 30 MG 24 hr tablet Take 1 tablet by mouth Daily. 90 tablet 3   • Loratadine 10 MG capsule Take  by mouth.     • melatonin 1 MG tablet Take  by mouth.     • meloxicam (MOBIC) 7.5 MG tablet TAKE 1 TABLET BY MOUTH DAILY 30 tablet 1   • metoprolol succinate XL (TOPROL-XL) 25 MG 24 hr tablet Take 12.5 mg by mouth Daily.     • nitroglycerin (NITROSTAT) 0.4 MG SL tablet Place 1 tablet under the tongue Every 5 (Five) Minutes As Needed for Chest Pain. Take no more than 3 doses in 15 minutes.     • omeprazole (priLOSEC) 20 MG capsule Take 1 capsule by mouth Daily.     • tamsulosin (FLOMAX) 0.4 MG capsule 24 hr capsule Take 1 capsule by mouth 2 (Two) Times a Day.     • triamcinolone (KENALOG) 0.1 % ointment Apply 1 application topically to the appropriate area as directed 2  "(Two) Times a Day. 30 g 2     No current facility-administered medications on file prior to visit.       /77 (BP Location: Left arm, Patient Position: Sitting, Cuff Size: Adult)   Pulse 82   Temp 98 °F (36.7 °C) (Oral)   Ht 180.3 cm (70.98\")   Wt 99.3 kg (219 lb)   SpO2 95%   BMI 30.56 kg/m²     Objective     Physical Exam  Vitals and nursing note reviewed.   Constitutional:       General: He is not in acute distress.     Appearance: Normal appearance. He is not ill-appearing.   HENT:      Head: Normocephalic and atraumatic.      Right Ear: Tympanic membrane, ear canal and external ear normal.      Left Ear: Tympanic membrane, ear canal and external ear normal.      Nose: Nose normal.      Right Sinus: No maxillary sinus tenderness or frontal sinus tenderness.      Left Sinus: No maxillary sinus tenderness or frontal sinus tenderness.      Mouth/Throat:      Lips: Pink.      Mouth: Mucous membranes are moist.      Pharynx: Oropharynx is clear. No pharyngeal swelling, oropharyngeal exudate, posterior oropharyngeal erythema or uvula swelling.   Eyes:      Extraocular Movements: Extraocular movements intact.      Pupils: Pupils are equal, round, and reactive to light.   Cardiovascular:      Rate and Rhythm: Normal rate and regular rhythm.      Heart sounds: Normal heart sounds. No murmur heard.  Pulmonary:      Effort: Pulmonary effort is normal. No accessory muscle usage or respiratory distress.      Breath sounds: Normal breath sounds. No wheezing or rhonchi.   Musculoskeletal:      Cervical back: Normal range of motion. No erythema or rigidity. Muscular tenderness present. No pain with movement. Normal range of motion.   Lymphadenopathy:      Cervical: No cervical adenopathy.   Skin:     General: Skin is warm and dry.   Neurological:      General: No focal deficit present.      Mental Status: He is alert and oriented to person, place, and time.      GCS: GCS eye subscore is 4. GCS verbal subscore is 5. " GCS motor subscore is 6.      Cranial Nerves: No facial asymmetry.      Sensory: Sensation is intact.      Motor: No weakness.      Gait: Gait is intact.   Psychiatric:         Mood and Affect: Mood and affect normal.         Behavior: Behavior normal.             Diagnoses and all orders for this visit:    1. Acute nonintractable headache, unspecified headache type (Primary)    2. Cavernous sinus tumor (HCC)    3. Neck pain      Patient and I discussed his symptoms and his history in detail.  Given the severity of his headaches and his history of a brain tumor, I recommend that he proceed to ER for further evaluation.  He may need further imaging and work-up in a more timely manner.  He voiced understanding and is agreeable to plan.  He declines EMS and is aware of risks.  He reports that he will be going to [facility] for further evaluation.     Follow up:  Return if symptoms worsen or fail to improve.  Patient was given instructions and counseling regarding his condition or for health maintenance advice. Please see specific information pulled into the AVS if appropriate.

## 2023-03-07 ENCOUNTER — OFFICE VISIT (OUTPATIENT)
Dept: CARDIOLOGY | Facility: CLINIC | Age: 73
End: 2023-03-07
Payer: MEDICARE

## 2023-03-07 VITALS
WEIGHT: 219 LBS | DIASTOLIC BLOOD PRESSURE: 78 MMHG | OXYGEN SATURATION: 97 % | HEIGHT: 71 IN | SYSTOLIC BLOOD PRESSURE: 128 MMHG | BODY MASS INDEX: 30.66 KG/M2 | HEART RATE: 63 BPM

## 2023-03-07 DIAGNOSIS — I25.10 CORONARY ARTERY DISEASE INVOLVING NATIVE CORONARY ARTERY OF NATIVE HEART WITHOUT ANGINA PECTORIS: Primary | ICD-10-CM

## 2023-03-07 PROCEDURE — 93000 ELECTROCARDIOGRAM COMPLETE: CPT | Performed by: NURSE PRACTITIONER

## 2023-03-07 PROCEDURE — 99214 OFFICE O/P EST MOD 30 MIN: CPT | Performed by: NURSE PRACTITIONER

## 2023-03-07 NOTE — PROGRESS NOTES
[facility] Follow Up Office Note     Encounter Date:23  Patient:Karan Graham  :1950  MRN:[id number]      Chief Complaint:   Chief Complaint   Patient presents with   • Coronary Artery Disease     2 week hospital f/u - cardiac cath   • Shortness of Breath   • Dizziness     History of Presenting Illness:        Karan Graham is a 72 y.o. male who is here for follow-up.  He is a patient of Dr Pedroza.    Patient has past medical history significant for CAD with prior PCI, mixed hyperlipidemia, REGULO not on CPAP, GERD, easy bruising, cavernous sinus tumor.      Patient was previously treated at [facility] for CAD and established care with Dr Pedroza in 2022 when he was experiencing exertional dyspnea.  This was his previous anginal equivalent. Stress PET 2022 was normal.  Echo also with normal LVEF and no significant valvular abnormalities.      He has seen pulmonary for shortness of breath and was started on additional neb for asthma. CXR and PFTs ordered which have not been done yet.    I saw patient about a month ago at which time he was complaining of chest pain episodes associated with exertion and dyspnea on exertion.  After discussion with Dr. Pedroza we ultimately decided to proceed with right and left heart catheterization.  This was done on  demonstrating patent previous stents with no significant flow-limiting disease.  There was a noted small distal circumflex which was diseased and supplying a small territory for which medical management was recommended.  His LVEDP was 6.  He was started on Imdur.    Patient reports today with terrible headache and intermittent lightheadedness.  This is his primary concern and he has not had recurrent chest pain.  His dyspnea is stable.  He denies concerns at cath stick site.    Review of Systems:  Review of Systems   Cardiovascular: Positive for dyspnea on exertion. Negative for chest pain, leg swelling, orthopnea and palpitations.    Respiratory: Negative for shortness of breath.    Neurological: Positive for headaches and light-headedness.       Current Outpatient Medications on File Prior to Visit   Medication Sig Dispense Refill   • albuterol sulfate  (90 Base) MCG/ACT inhaler INHALE 2 PUFFS EVERY 6 HOURS AS NEEDED FOR SHORTNESS OF BREATH     • aspirin 81 MG chewable tablet Chew 1 tablet Daily.     • atorvastatin (LIPITOR) 80 MG tablet Take 1 tablet by mouth Every Night.     • clopidogrel (PLAVIX) 75 MG tablet Take 1 tablet by mouth Daily.     • cyclobenzaprine (FLEXERIL) 10 MG tablet Take 1 tablet by mouth 3 (Three) Times a Day As Needed for Muscle Spasms. 60 tablet 1   • guaiFENesin (MUCINEX PO) Take  by mouth.     • Loratadine 10 MG capsule Take  by mouth.     • melatonin 1 MG tablet Take  by mouth.     • meloxicam (MOBIC) 7.5 MG tablet TAKE 1 TABLET BY MOUTH DAILY 30 tablet 1   • metoprolol succinate XL (TOPROL-XL) 25 MG 24 hr tablet Take 12.5 mg by mouth Daily.     • nitroglycerin (NITROSTAT) 0.4 MG SL tablet Place 1 tablet under the tongue Every 5 (Five) Minutes As Needed for Chest Pain. Take no more than 3 doses in 15 minutes.     • omeprazole (priLOSEC) 20 MG capsule Take 1 capsule by mouth Daily.     • tamsulosin (FLOMAX) 0.4 MG capsule 24 hr capsule Take 1 capsule by mouth 2 (Two) Times a Day.     • triamcinolone (KENALOG) 0.1 % ointment Apply 1 application topically to the appropriate area as directed 2 (Two) Times a Day. 30 g 2   • [DISCONTINUED] amoxicillin-clavulanate (Augmentin) 875-125 MG per tablet Take 1 tablet by mouth 2 (Two) Times a Day for 7 days. 14 tablet 0   • [DISCONTINUED] isosorbide mononitrate (IMDUR) 30 MG 24 hr tablet Take 1 tablet by mouth Daily. 90 tablet 3     No current facility-administered medications on file prior to visit.       Allergies   Allergen Reactions   • Codeine Diarrhea and Nausea And Vomiting   • Ibuprofen Nausea And Vomiting       Past Medical History:   Diagnosis Date   • Asthma     • CAD (coronary artery disease)     hx stents june 2020   • GERD (gastroesophageal reflux disease) Many years ago   • Hypertension    • Pancreas (digestive gland) works poorly    • Skin cancer    • Sleep apnea        Past Surgical History:   Procedure Laterality Date   • BACK SURGERY     • CARDIAC CATHETERIZATION  06/2020   • CARDIAC CATHETERIZATION N/A 2/21/2023    Procedure: Left Heart Cath;  Surgeon: Eduard Pedroza MD;  Location:  [facility];  Service: Cardiovascular;  Laterality: N/A;   • CARDIAC CATHETERIZATION N/A 2/21/2023    Procedure: Coronary angiography;  Surgeon: Eduard Pedroza MD;  Location:  [facility];  Service: Cardiovascular;  Laterality: N/A;   • CHOLECYSTECTOMY     • COLONOSCOPY  2020    NORMAL   • CORONARY ANGIOPLASTY WITH STENT PLACEMENT      TWICE   • FRACTURE SURGERY  Various   • HEMORRHOIDECTOMY     • PANCREAS SURGERY      STENTING   • SHOULDER SURGERY     • SINUS SURGERY  2022    Deviated septum   • TOE SURGERY Right     big toe   • TONSILLECTOMY     • TUMOR REMOVAL      Roof Of Mouth       Social History     Socioeconomic History   • Marital status:    Tobacco Use   • Smoking status: Never   • Smokeless tobacco: Never   Vaping Use   • Vaping Use: Never used   Substance and Sexual Activity   • Alcohol use: Yes     Comment: Don’t drink weekly; '2 drinks per month'   • Drug use: No   • Sexual activity: Defer     Partners: Female     Birth control/protection: None       Family History   Problem Relation Age of Onset   • Hypertension Mother    • Leukemia Mother    • No Known Problems Father    • Hypertension Sister    • Heart disease Brother    • Cancer Brother         Colon cancer   • Cancer Brother         Prostate   • Cancer Brother         Lung cancer   • Cancer Brother         Lung cancer   • No Known Problems Maternal Aunt    • No Known Problems Maternal Uncle    • No Known Problems Paternal Aunt    • No Known Problems Paternal Uncle   "  • No Known Problems Maternal Grandmother    • No Known Problems Maternal Grandfather    • No Known Problems Paternal Grandmother    • No Known Problems Paternal Grandfather    • Cancer Other    • Anesthesia problems Neg Hx    • Broken bones Neg Hx    • Clotting disorder Neg Hx    • Collagen disease Neg Hx    • Diabetes Neg Hx    • Dislocations Neg Hx    • Osteoporosis Neg Hx    • Rheumatologic disease Neg Hx    • Scoliosis Neg Hx    • Severe sprains Neg Hx        The following portions of the patient's history were reviewed and updated as appropriate: allergies, current medications, past family history, past medical history, past social history, past surgical history and problem list.       Objective:       Vitals:    03/07/23 1207   BP: 128/78   BP Location: Left arm   Patient Position: Sitting   Pulse: 63   SpO2: 97%   Weight: 99.3 kg (219 lb)   Height: 180.3 cm (70.98\")         Physical Exam: Examined 3/7/23  Constitutional: Well appearing, no acute distress   HENT: Oropharynx clear and membrane moist  Eyes: Normal conjunctiva, no sclera icterus  Neck: Supple, no carotid bruit bilaterally  Cardiovascular: Regular rate and rhythm, No Murmur, No bilateral lower extremity edema  Pulmonary: Normal respiratory effort, normal lung sounds  Neurological: Alert and orient x 3  Skin: Warm, dry, no ecchymosis, no rash  Psych: Appropriate mood and affect         Lab Results   Component Value Date     02/08/2023     09/09/2020    K 4.4 02/08/2023    K 3.5 09/09/2020     02/08/2023     09/09/2020    CO2 28.0 02/08/2023    CO2 25.4 09/09/2020    BUN 12 02/08/2023    BUN 7 (L) 09/09/2020    CREATININE 0.90 02/08/2023    CREATININE 0.78 09/09/2020    EGFRIFNONA 99 09/09/2020    EGFRIFNONA 95 09/08/2020    GLUCOSE 92 02/08/2023    GLUCOSE 98 09/09/2020    CALCIUM 9.2 02/08/2023    CALCIUM 9.0 09/09/2020    ALBUMIN 4.4 02/08/2023    ALBUMIN 4.00 09/08/2020    BILITOT 1.0 02/08/2023    BILITOT 0.9 " 09/08/2020    AST 21 02/08/2023    AST 18 09/08/2020    ALT 24 02/08/2023    ALT 16 09/08/2020     Lab Results   Component Value Date    WBC 6.30 02/08/2023    WBC 6.22 08/12/2022    HGB 14.1 02/08/2023    HGB 13.4 08/12/2022    HCT 40.2 02/08/2023    HCT 41.6 08/12/2022    MCV 87.6 02/08/2023    MCV 93.5 08/12/2022     02/08/2023     08/12/2022     Lab Results   Component Value Date    CHOL 95 08/12/2022    CHOL 77 10/19/2020    TRIG 111 08/12/2022    TRIG 83 10/19/2020    HDL 31 (L) 08/12/2022    HDL 35 (L) 10/19/2020    LDL 43 08/12/2022    LDL 25 10/19/2020     Lab Results   Component Value Date    PROBNP 28.7 02/08/2023    PROBNP 34.7 03/23/2017    BNP <11.1 08/09/2019     Lab Results   Component Value Date    CKTOTAL 53 03/22/2017    CKMB 1.32 03/22/2017    TROPONINI <0.012 08/09/2019    TROPONINT <0.010 03/23/2017     Lab Results   Component Value Date    TSH 1.190 08/12/2022    TSH 0.776 03/23/2017           ECG 12 Lead    Date/Time: 3/7/2023 12:41 PM  Performed by: Aiyana Metcalf APRN  Authorized by: Aiyana Metcalf APRN   Comparison: compared with previous ECG from 2/8/2023  Similar to previous ECG  Rhythm: sinus rhythm  Rate: normal  Conduction: right bundle branch block        Left heart catheterization 2/23/23:  Findings:  1. Coronary Artery Anatomy:  Dominance: Right  Left Main: Angiographically normal  Left Anterior Descending: Moderate caliber size.  There is a stent from the proximal segment within the mid segment with no significant in-stent restenosis followed by luminal irregularities distally.  There is mid diagonal branches with CLARISA-3 flow and no significant pinching from the previous stent.  Circumflex Artery: Moderate caliber size.  Supplies a mid and inferior marginal branch.  Contains luminal irregularities throughout.  There is a small continuation of the distal circumflex which is small less than 2 mm in diameter and diffusely diseased supplying a small  territory.  Right Coronary Artery: Moderate caliber size.  Contains a patent stent within the distal segment extending into what appears to be the PDA.  There is a large posterior lateral branch all of which contains luminal regularities.     2. Hemodynamics:  Left Ventricle: 109/3/6 mmHg  Aorta: 105/67/85 mmHg     Conclusions:  1. No significant coronary artery disease with patent stents in the proximal to mid LAD as well as distal RCA and the PDA with no significant in-stent restenosis.  Circumflex contains luminal irregularities.  There is some small vessel disease within the very distal circumflex but small in caliber size and distribution.  2. Normal LVEDP of 6 mmHg     Recommendations:   1. We will try adding on low-dose Imdur to see if this helps with management of patient's symptoms and chest pain.  If this does not work could also stop and try low-dose amlodipine but will need to be careful to monitor his blood pressure.  2. If this does not help would look for other etiologies for patient's symptoms.      Cardiac stress test 9/22/2022:  • Myocardial perfusion imaging indicates a normal myocardial perfusion study with no evidence of ischemia.  • Left ventricular ejection fraction is hyperdynamic (Calculated EF > 70%). .  • Impressions are consistent with a low risk study.     Echocardiogram 9/22/2022:  • Calculated left ventricular EF = 68.4%. Left ventricular systolic function is normal.All left ventricular wall segments contract normally.Normal Global longitudinal LV strain (GLS) = -23.2%.  • Left ventricular diastolic function was normal.     Stent card 8/13/2020:  • Xience RALEIGH 2.5 x 23 mm placed to distal RCA     Stent card 6/26/2020:  • Promus RALEIGH placed LAD Dr. Cox at [facility]     Echocardiogram 6/22/2018 :  • Overall left ventricular function is normal. The estimated ejection fraction is 60-65%.   • Trace-to-mild mitral regurgitation is present.   • Patent foramen ovale.       Carotid duplex 6/21/2018 [facility]:  • Bilateral internal carotid arteries with plaque but <50% stenosis.  • Bilateral vertebral arteries with antegrade flow.  • No prior examination for comparison.         Assessment:          Diagnosis Plan   1. Coronary artery disease involving native coronary artery of native heart without angina pectoris  ECG 12 Lead             Plan:       Coronary artery disease with stable angina  · Hx of prior RALEIGH x2 at [facility]  · [facility] 2/23/23 with patent stents, distal LCX small vessel disease supplying small territory with recommendation for medical therapy  · Patient started Imdur, experiencing headaches and lightheadedness. We will stop this, monitor symptoms. Needs to follow-up with PCP if symptoms persist  · If return of chest pain will start low dose amlodipine for medical management of angina  · Continue aspirin, clopidogrel, metoprolol succinate    Essential hypertension  · BP controlled    Mixed hyperlipidemia  · Continue statin therapy  · LDL at goal    Patient no significant disease on recent cath, small vessel disease with medical management. Unfortunately is not tolerating Imdur and will stop this. Hold on starting further meds as it seems his BP is sensitive and monitor symptoms. Follow-up with Dr Pedroza in 6 months or earlier with problems.    Orders Placed This Encounter   Procedures   • ECG 12 Lead     This order was created via procedure documentation     Order Specific Question:   Release to patient     Answer:   Routine Release            AMINAH Kimble  [facility]  03/07/23  12:45 EST

## 2023-03-29 ENCOUNTER — OFFICE VISIT (OUTPATIENT)
Dept: FAMILY MEDICINE CLINIC | Age: 73
End: 2023-03-29
Payer: MEDICARE

## 2023-03-29 VITALS
TEMPERATURE: 98.6 F | WEIGHT: 218.4 LBS | HEIGHT: 71 IN | SYSTOLIC BLOOD PRESSURE: 130 MMHG | DIASTOLIC BLOOD PRESSURE: 73 MMHG | HEART RATE: 77 BPM | OXYGEN SATURATION: 99 % | BODY MASS INDEX: 30.57 KG/M2

## 2023-03-29 DIAGNOSIS — R22.9 SOFT TISSUE SWELLING: Primary | ICD-10-CM

## 2023-03-29 DIAGNOSIS — M25.562 ACUTE PAIN OF LEFT KNEE: ICD-10-CM

## 2023-03-29 PROCEDURE — 3078F DIAST BP <80 MM HG: CPT | Performed by: NURSE PRACTITIONER

## 2023-03-29 PROCEDURE — 1159F MED LIST DOCD IN RCRD: CPT | Performed by: NURSE PRACTITIONER

## 2023-03-29 PROCEDURE — 99213 OFFICE O/P EST LOW 20 MIN: CPT | Performed by: NURSE PRACTITIONER

## 2023-03-29 PROCEDURE — 1160F RVW MEDS BY RX/DR IN RCRD: CPT | Performed by: NURSE PRACTITIONER

## 2023-03-29 PROCEDURE — 3075F SYST BP GE 130 - 139MM HG: CPT | Performed by: NURSE PRACTITIONER

## 2023-03-29 RX ORDER — TRAMADOL HYDROCHLORIDE 50 MG/1
50 TABLET ORAL EVERY 6 HOURS PRN
COMMUNITY
End: 2023-03-29 | Stop reason: SDUPTHER

## 2023-03-29 RX ORDER — TRAMADOL HYDROCHLORIDE 50 MG/1
50 TABLET ORAL EVERY 6 HOURS PRN
Qty: 10 TABLET | Refills: 0 | Status: SHIPPED | OUTPATIENT
Start: 2023-03-29

## 2023-03-29 RX ORDER — AMOXICILLIN 500 MG/1
CAPSULE ORAL
COMMUNITY
Start: 2023-03-28 | End: 2023-03-29

## 2023-03-29 NOTE — PROGRESS NOTES
"Chief Complaint  Mass (Located on collarbone and mouth; sx started 3 days ago )    Subjective        Karan Graham presents to [facility]  Cyst  This is a new problem. The current episode started in the past 7 days (Noticed soft, noduler swelling over left clavicle 3 days ago). Pertinent negatives include no arthralgias, chest pain, chills, fever or rash. Associated symptoms comments: Denies pain to left clavicular area but mild tenderness if press on the area. Denies any injury to the left chest area. States chest pain x 1 a couple weeks ago. States cardiologist stopped a medication that was similar to nitroglycerin (he cannot recall name) but he was having mild chest pain and headaches, so cardiologist stopped it, and he hasn't had headache or chest pain since. States also had a lipoma removed from right shoulder area 20 years ago. He also had what he says is a hemangioma removed from his cheek 5-7 years ago. . Exacerbated by: Only pressure applied to the area. He has tried nothing for the symptoms.   Knee Pain   The pain is present in the left knee. The quality of the pain is described as aching. He has tried NSAIDs (Ultram ) for the symptoms. The treatment provided significant relief.       Objective   Vital Signs:  /73 (BP Location: Left arm, Patient Position: Sitting, Cuff Size: Adult)   Pulse 77   Temp 98.6 °F (37 °C) (Oral) Comment: pt states that he had tylenol around 9am  Ht 180.3 cm (70.98\")   Wt 99.1 kg (218 lb 6.4 oz)   SpO2 99%   BMI 30.48 kg/m²   Estimated body mass index is 30.48 kg/m² as calculated from the following:    Height as of this encounter: 180.3 cm (70.98\").    Weight as of this encounter: 99.1 kg (218 lb 6.4 oz).             Physical Exam  HENT:      Head: Normocephalic.      Nose: Congestion and rhinorrhea present.   Cardiovascular:      Rate and Rhythm: Regular rhythm.   Chest:      Chest wall: No deformity, tenderness or crepitus.      " Comments: Soft, mobile swelling over proximal portion of left clavicle. No redness or warmth. No tenderness to palpation and border not well defined.  Musculoskeletal:      Cervical back: No rigidity or tenderness.   Lymphadenopathy:      Head:      Right side of head: No submandibular, preauricular or posterior auricular adenopathy.      Left side of head: No submandibular, preauricular or posterior auricular adenopathy.      Cervical: Cervical adenopathy present.      Upper Body:      Right upper body: No supraclavicular adenopathy.      Left upper body: No supraclavicular adenopathy.   Skin:     General: Skin is warm.   Neurological:      Mental Status: He is alert and oriented to person, place, and time.   Psychiatric:         Mood and Affect: Mood normal.         Behavior: Behavior normal.        Result Review :                   Assessment and Plan   Diagnoses and all orders for this visit:    1. Soft tissue swelling (Primary)  Comments:  Will order U/S to further characterize.  Orders:  -     US Soft Tissue; Future    2. Acute pain of left knee  -     traMADol (ULTRAM) 50 MG tablet; Take 1 tablet by mouth Every 6 (Six) Hours As Needed (Knee pain).  Dispense: 10 tablet; Refill: 0             Follow Up   Return if symptoms worsen or fail to improve.  Patient was given instructions and counseling regarding his condition or for health maintenance advice. Please see specific information pulled into the AVS if appropriate.     Seen with NP student Radha Mckeon.

## 2023-04-05 ENCOUNTER — TELEPHONE (OUTPATIENT)
Dept: CARDIOLOGY | Facility: CLINIC | Age: 73
End: 2023-04-05
Payer: MEDICARE

## 2023-04-05 NOTE — TELEPHONE ENCOUNTER
He is cleared. If needed, he may hold Plavix for 5 days prior to procedure.    Can you fax clearance?    Thank you!

## 2023-04-05 NOTE — TELEPHONE ENCOUNTER
Received paperwork from [facility]. Patient is needing cardiac clearance to undergo sedation for a lumbar medical branch block and RFA.     May they proceed?     Their office can be reached at [phone]. Fax number [phone]

## 2023-04-10 ENCOUNTER — HOSPITAL ENCOUNTER (OUTPATIENT)
Dept: ULTRASOUND IMAGING | Facility: HOSPITAL | Age: 73
Discharge: HOME | End: 2023-04-10
Admitting: NURSE PRACTITIONER
Payer: MEDICARE

## 2023-04-10 DIAGNOSIS — R22.9 SOFT TISSUE SWELLING: ICD-10-CM

## 2023-04-10 PROCEDURE — 76999 ECHO EXAMINATION PROCEDURE: CPT

## 2023-04-18 RX ORDER — ATORVASTATIN CALCIUM 80 MG/1
80 TABLET, FILM COATED ORAL NIGHTLY
Qty: 30 TABLET | Refills: 0 | Status: SHIPPED | OUTPATIENT
Start: 2023-04-18

## 2023-04-18 RX ORDER — ATORVASTATIN CALCIUM 80 MG/1
80 TABLET, FILM COATED ORAL NIGHTLY
Qty: 90 TABLET | OUTPATIENT
Start: 2023-04-18

## 2023-04-18 NOTE — TELEPHONE ENCOUNTER
Rx Refill Note  Requested Prescriptions     Pending Prescriptions Disp Refills   • atorvastatin (LIPITOR) 80 MG tablet 90 tablet      Sig: Take 1 tablet by mouth Every Night.      Last office visit with prescribing clinician: 11/3/2022        Next office visit with prescribing clinician: 5/8/2023       Stacey Baldwin LPN  04/18/23, 11:23 EDT

## 2023-04-24 ENCOUNTER — OFFICE VISIT (OUTPATIENT)
Dept: CARDIOLOGY | Facility: CLINIC | Age: 73
End: 2023-04-24
Payer: MEDICARE

## 2023-04-24 VITALS
HEART RATE: 70 BPM | HEIGHT: 70 IN | BODY MASS INDEX: 31.78 KG/M2 | WEIGHT: 222 LBS | DIASTOLIC BLOOD PRESSURE: 77 MMHG | SYSTOLIC BLOOD PRESSURE: 139 MMHG | OXYGEN SATURATION: 98 %

## 2023-04-24 DIAGNOSIS — E78.2 MIXED HYPERLIPIDEMIA: ICD-10-CM

## 2023-04-24 DIAGNOSIS — I25.10 CORONARY ARTERY DISEASE INVOLVING NATIVE CORONARY ARTERY OF NATIVE HEART WITHOUT ANGINA PECTORIS: Primary | ICD-10-CM

## 2023-04-24 DIAGNOSIS — I10 ESSENTIAL HYPERTENSION: ICD-10-CM

## 2023-04-24 DIAGNOSIS — G47.30 OBSERVED SLEEP APNEA: ICD-10-CM

## 2023-04-24 PROCEDURE — 3078F DIAST BP <80 MM HG: CPT | Performed by: NURSE PRACTITIONER

## 2023-04-24 PROCEDURE — 99214 OFFICE O/P EST MOD 30 MIN: CPT | Performed by: NURSE PRACTITIONER

## 2023-04-24 PROCEDURE — 1160F RVW MEDS BY RX/DR IN RCRD: CPT | Performed by: NURSE PRACTITIONER

## 2023-04-24 PROCEDURE — 1159F MED LIST DOCD IN RCRD: CPT | Performed by: NURSE PRACTITIONER

## 2023-04-24 PROCEDURE — 3075F SYST BP GE 130 - 139MM HG: CPT | Performed by: NURSE PRACTITIONER

## 2023-04-24 RX ORDER — SODIUM, POTASSIUM,MAG SULFATES 17.5-3.13G
SOLUTION, RECONSTITUTED, ORAL ORAL
COMMUNITY
Start: 2023-03-29

## 2023-04-24 RX ORDER — METOPROLOL SUCCINATE 25 MG/1
25 TABLET, EXTENDED RELEASE ORAL DAILY
Qty: 90 TABLET | Refills: 3 | Status: SHIPPED | OUTPATIENT
Start: 2023-04-24

## 2023-04-24 NOTE — PROGRESS NOTES
Date of Office Visit: 2023  Encounter Provider: AMINAH Wright  Place of Service: [facility] CARDIOLOGY  Patient Name: Karan Graham  :1950    Chief Complaint   Patient presents with   • Follow-up   • Coronary Artery Disease   :     HPI: Karan Graham is a 72 y.o. male who is a patient of  Dr. Interiano and is new to me today.  He has a history of coronary disease with previous PCI, hyperlipidemia, obstructive sleep apnea not on CPAP, GERD, cavernous sinus tumor.  In 2022 he had some exertional dyspnea.  He had a stress PET in September that was normal.  His echo was also stable with no significant abnormalities.  He was started on a nebulizer for asthma and had a chest x-ray and PFTs.  He was last seen in March by one of our nurse practitioners he had also had some chest pain and dyspnea in February of this year he underwent a right and left heart cath.  He had no flow-limiting coronary disease there was a small distal circumflex that supplied a small territory which was managed medically.  His LVEDP was stable at 6 and he was started on isosorbide.  When he came to the office last month he was having terrible headaches and some intermittent lightheadedness.  We stopped his isosorbide to see if his symptoms improved and recommended follow-up with his primary care.  Circumflex: RCA: Ramus normal right heart pressures    No anginal-like symptoms.  Headache gone now that he is not taking isosorbide.  No exertional symptoms.  Sometimes at rest he will have a sharp pain in the left chest that last for a few minutes but nothing else.  Previous testing and notes have been reviewed by me.   Past Medical History:   Diagnosis Date   • Asthma    • CAD (coronary artery disease)     hx stents 2020   • GERD (gastroesophageal reflux disease) Many years ago   • Hypertension    • Pancreas (digestive gland) works poorly    • Skin cancer    • Sleep apnea        Past  Surgical History:   Procedure Laterality Date   • BACK SURGERY     • CARDIAC CATHETERIZATION  06/2020   • CARDIAC CATHETERIZATION N/A 2/21/2023    Procedure: Left Heart Cath;  Surgeon: Eduard Pedroza MD;  Location:  [facility];  Service: Cardiovascular;  Laterality: N/A;   • CARDIAC CATHETERIZATION N/A 2/21/2023    Procedure: Coronary angiography;  Surgeon: Eduard Pedroza MD;  Location:  [facility];  Service: Cardiovascular;  Laterality: N/A;   • CHOLECYSTECTOMY     • COLONOSCOPY  2020    NORMAL   • CORONARY ANGIOPLASTY WITH STENT PLACEMENT      TWICE   • FRACTURE SURGERY  Various   • HEMORRHOIDECTOMY     • PANCREAS SURGERY      STENTING   • SHOULDER SURGERY     • SINUS SURGERY  2022    Deviated septum   • TOE SURGERY Right     big toe   • TONSILLECTOMY     • TUMOR REMOVAL      Roof Of Mouth       Social History     Socioeconomic History   • Marital status:    Tobacco Use   • Smoking status: Never   • Smokeless tobacco: Never   Vaping Use   • Vaping Use: Never used   Substance and Sexual Activity   • Alcohol use: Yes     Comment: Don’t drink weekly; '2 drinks per month'   • Drug use: No   • Sexual activity: Defer     Partners: Female     Birth control/protection: None       Family History   Problem Relation Age of Onset   • Hypertension Mother    • Leukemia Mother    • No Known Problems Father    • Hypertension Sister    • Heart disease Brother    • Cancer Brother         Colon cancer   • Cancer Brother         Prostate   • Cancer Brother         Lung cancer   • Cancer Brother         Lung cancer   • No Known Problems Maternal Aunt    • No Known Problems Maternal Uncle    • No Known Problems Paternal Aunt    • No Known Problems Paternal Uncle    • No Known Problems Maternal Grandmother    • No Known Problems Maternal Grandfather    • No Known Problems Paternal Grandmother    • No Known Problems Paternal Grandfather    • Cancer Other    • Anesthesia problems Neg Hx     • Broken bones Neg Hx    • Clotting disorder Neg Hx    • Collagen disease Neg Hx    • Diabetes Neg Hx    • Dislocations Neg Hx    • Osteoporosis Neg Hx    • Rheumatologic disease Neg Hx    • Scoliosis Neg Hx    • Severe sprains Neg Hx        Review of Systems   Constitutional: Negative for diaphoresis and malaise/fatigue.   Cardiovascular: Negative for chest pain, claudication, dyspnea on exertion, irregular heartbeat, leg swelling, near-syncope, orthopnea, palpitations, paroxysmal nocturnal dyspnea and syncope.   Respiratory: Negative for cough, shortness of breath and sleep disturbances due to breathing.    Musculoskeletal: Negative for falls.   Neurological: Negative for dizziness and weakness.   Psychiatric/Behavioral: Negative for altered mental status and substance abuse.       Allergies   Allergen Reactions   • Codeine Diarrhea and Nausea And Vomiting   • Ibuprofen Nausea And Vomiting         Current Outpatient Medications:   •  albuterol sulfate  (90 Base) MCG/ACT inhaler, INHALE 2 PUFFS EVERY 6 HOURS AS NEEDED FOR SHORTNESS OF BREATH, Disp: , Rfl:   •  aspirin 81 MG chewable tablet, Chew 1 tablet Daily., Disp: , Rfl:   •  atorvastatin (LIPITOR) 80 MG tablet, Take 1 tablet by mouth Every Night., Disp: 30 tablet, Rfl: 0  •  clopidogrel (PLAVIX) 75 MG tablet, Take 1 tablet by mouth Daily., Disp: , Rfl:   •  cyclobenzaprine (FLEXERIL) 10 MG tablet, Take 1 tablet by mouth 3 (Three) Times a Day As Needed for Muscle Spasms., Disp: 60 tablet, Rfl: 1  •  guaiFENesin (MUCINEX PO), Take  by mouth., Disp: , Rfl:   •  Loratadine 10 MG capsule, Take  by mouth., Disp: , Rfl:   •  melatonin 1 MG tablet, Take  by mouth., Disp: , Rfl:   •  metoprolol succinate XL (TOPROL-XL) 25 MG 24 hr tablet, Take 12.5 mg by mouth Daily., Disp: , Rfl:   •  nitroglycerin (NITROSTAT) 0.4 MG SL tablet, Place 1 tablet under the tongue Every 5 (Five) Minutes As Needed for Chest Pain. Take no more than 3 doses in 15 minutes., Disp: ,  "Rfl:   •  omeprazole (priLOSEC) 20 MG capsule, Take 1 capsule by mouth Daily., Disp: , Rfl:   •  sodium-potassium-magnesium sulfates (SUPREP) 17.5-3.13-1.6 GM/177ML solution oral solution, TAKE 354 MLS BY MOUTH AS NEEDED, Disp: , Rfl:   •  tamsulosin (FLOMAX) 0.4 MG capsule 24 hr capsule, Take 1 capsule by mouth 2 (Two) Times a Day., Disp: , Rfl:   •  traMADol (ULTRAM) 50 MG tablet, Take 1 tablet by mouth Every 6 (Six) Hours As Needed (Knee pain)., Disp: 10 tablet, Rfl: 0  •  triamcinolone (KENALOG) 0.1 % ointment, Apply 1 application topically to the appropriate area as directed 2 (Two) Times a Day., Disp: 30 g, Rfl: 2      Objective:     Vitals:    04/24/23 1042   BP: 139/77   Pulse: 70   SpO2: 98%   Weight: 101 kg (222 lb)   Height: 177.8 cm (70\")     Body mass index is 31.85 kg/m².    PHYSICAL EXAM:    Constitutional:       General: Not in acute distress.     Appearance: Normal appearance. Well-developed.   Eyes:      Pupils: Pupils are equal, round, and reactive to light.   HENT:      Head: Normocephalic.   Neck:      Vascular: No carotid bruit or JVD.   Pulmonary:      Effort: Pulmonary effort is normal. No tachypnea.      Breath sounds: Normal breath sounds. No wheezing. No rales.   Cardiovascular:      Normal rate. Regular rhythm.      No gallop.   Pulses:     Intact distal pulses.   Edema:     Peripheral edema absent.   Abdominal:      General: Bowel sounds are normal.      Palpations: Abdomen is soft.      Tenderness: There is no abdominal tenderness.   Musculoskeletal: Normal range of motion.      Cervical back: Normal range of motion and neck supple. No edema. Skin:     General: Skin is warm and dry.   Neurological:      Mental Status: Alert and oriented to person, place, and time.         Procedures      Assessment:       Diagnosis Plan   1. Coronary artery disease (stents)      Continue aspirin, statin, Plavix and beta-blocker.  Some atypical sharp pain   2. Essential hypertension     Increase " metoprolol to 25 mg daily   3. Mixed hyperlipidemia     Continue current statin regimen   4. Observed sleep apnea (cannot tolerate CPAP)      Not wearing CPAP he thinks it causes him to have upper respiratory and sinus infections.  Recommended him getting reevaluated as this could be a cause of his dyspnea with exertion and fatigue.     No orders of the defined types were placed in this encounter.         Plan:   Follow-up in 3 months with Dr. Interiano.         Your medication list          Accurate as of April 24, 2023 10:50 AM. If you have any questions, ask your nurse or doctor.            CONTINUE taking these medications      Instructions Last Dose Given Next Dose Due   albuterol sulfate  (90 Base) MCG/ACT inhaler  Commonly known as: PROVENTIL HFA;VENTOLIN HFA;PROAIR HFA      INHALE 2 PUFFS EVERY 6 HOURS AS NEEDED FOR SHORTNESS OF BREATH       aspirin 81 MG chewable tablet      Chew 1 tablet Daily.       atorvastatin 80 MG tablet  Commonly known as: LIPITOR      Take 1 tablet by mouth Every Night.       clopidogrel 75 MG tablet  Commonly known as: PLAVIX      Take 1 tablet by mouth Daily.       cyclobenzaprine 10 MG tablet  Commonly known as: FLEXERIL      Take 1 tablet by mouth 3 (Three) Times a Day As Needed for Muscle Spasms.       Loratadine 10 MG capsule      Take  by mouth.       melatonin 1 MG tablet      Take  by mouth.       metoprolol succinate XL 25 MG 24 hr tablet  Commonly known as: TOPROL-XL      Take 12.5 mg by mouth Daily.       MUCINEX PO      Take  by mouth.       nitroglycerin 0.4 MG SL tablet  Commonly known as: NITROSTAT      Place 1 tablet under the tongue Every 5 (Five) Minutes As Needed for Chest Pain. Take no more than 3 doses in 15 minutes.       omeprazole 20 MG capsule  Commonly known as: priLOSEC      Take 1 capsule by mouth Daily.       sodium-potassium-magnesium sulfates 17.5-3.13-1.6 GM/177ML solution oral solution  Commonly known as: SUPREP      TAKE 354 MLS BY MOUTH AS  NEEDED       tamsulosin 0.4 MG capsule 24 hr capsule  Commonly known as: FLOMAX      Take 1 capsule by mouth 2 (Two) Times a Day.       traMADol 50 MG tablet  Commonly known as: ULTRAM      Take 1 tablet by mouth Every 6 (Six) Hours As Needed (Knee pain).       triamcinolone 0.1 % ointment  Commonly known as: KENALOG      Apply 1 application topically to the appropriate area as directed 2 (Two) Times a Day.                As always, it has been a pleasure to participate in your patient's care.      Sincerely,     Sunshine BURR

## 2023-05-08 ENCOUNTER — OFFICE VISIT (OUTPATIENT)
Dept: FAMILY MEDICINE CLINIC | Age: 73
End: 2023-05-08
Payer: MEDICARE

## 2023-05-08 ENCOUNTER — LAB (OUTPATIENT)
Dept: LAB | Facility: HOSPITAL | Age: 73
End: 2023-05-08
Payer: MEDICARE

## 2023-05-08 VITALS
HEART RATE: 74 BPM | SYSTOLIC BLOOD PRESSURE: 122 MMHG | DIASTOLIC BLOOD PRESSURE: 70 MMHG | OXYGEN SATURATION: 97 % | BODY MASS INDEX: 31.38 KG/M2 | HEIGHT: 70 IN | WEIGHT: 219.2 LBS

## 2023-05-08 DIAGNOSIS — J45.20 MILD INTERMITTENT ASTHMA, UNCOMPLICATED: ICD-10-CM

## 2023-05-08 DIAGNOSIS — K21.9 GERD WITHOUT ESOPHAGITIS: ICD-10-CM

## 2023-05-08 DIAGNOSIS — M25.572 CHRONIC PAIN OF LEFT ANKLE: ICD-10-CM

## 2023-05-08 DIAGNOSIS — G89.29 CHRONIC PAIN OF LEFT ANKLE: ICD-10-CM

## 2023-05-08 DIAGNOSIS — M15.9 GENERALIZED OSTEOARTHRITIS: ICD-10-CM

## 2023-05-08 DIAGNOSIS — E78.00 HIGH CHOLESTEROL: ICD-10-CM

## 2023-05-08 DIAGNOSIS — I10 ESSENTIAL HYPERTENSION: Primary | ICD-10-CM

## 2023-05-08 PROBLEM — M54.50 ACUTE BILATERAL LOW BACK PAIN WITHOUT SCIATICA: Status: RESOLVED | Noted: 2023-01-04 | Resolved: 2023-05-08

## 2023-05-08 LAB
ALBUMIN SERPL-MCNC: 4.9 G/DL (ref 3.5–5.2)
ALBUMIN/GLOB SERPL: 2.7 G/DL
ALP SERPL-CCNC: 77 U/L (ref 39–117)
ALT SERPL W P-5'-P-CCNC: 26 U/L (ref 1–41)
ANION GAP SERPL CALCULATED.3IONS-SCNC: 9 MMOL/L (ref 5–15)
AST SERPL-CCNC: 29 U/L (ref 1–40)
BASOPHILS # BLD AUTO: 0.04 10*3/MM3 (ref 0–0.2)
BASOPHILS NFR BLD AUTO: 0.7 % (ref 0–1.5)
BILIRUB SERPL-MCNC: 0.9 MG/DL (ref 0–1.2)
BUN SERPL-MCNC: 12 MG/DL (ref 8–23)
BUN/CREAT SERPL: 14.3 (ref 7–25)
CALCIUM SPEC-SCNC: 9.1 MG/DL (ref 8.6–10.5)
CHLORIDE SERPL-SCNC: 107 MMOL/L (ref 98–107)
CHOLEST SERPL-MCNC: 92 MG/DL (ref 0–200)
CO2 SERPL-SCNC: 24 MMOL/L (ref 22–29)
CREAT SERPL-MCNC: 0.84 MG/DL (ref 0.76–1.27)
DEPRECATED RDW RBC AUTO: 40.7 FL (ref 37–54)
EGFRCR SERPLBLD CKD-EPI 2021: 93.8 ML/MIN/1.73
EOSINOPHIL # BLD AUTO: 0.16 10*3/MM3 (ref 0–0.4)
EOSINOPHIL NFR BLD AUTO: 2.8 % (ref 0.3–6.2)
ERYTHROCYTE [DISTWIDTH] IN BLOOD BY AUTOMATED COUNT: 12.4 % (ref 12.3–15.4)
GLOBULIN UR ELPH-MCNC: 1.8 GM/DL
GLUCOSE SERPL-MCNC: 106 MG/DL (ref 65–99)
HCT VFR BLD AUTO: 40.7 % (ref 37.5–51)
HDLC SERPL-MCNC: 35 MG/DL (ref 40–60)
HGB BLD-MCNC: 14.2 G/DL (ref 13–17.7)
IMM GRANULOCYTES # BLD AUTO: 0 10*3/MM3 (ref 0–0.05)
IMM GRANULOCYTES NFR BLD AUTO: 0 % (ref 0–0.5)
LDLC SERPL CALC-MCNC: 37 MG/DL (ref 0–100)
LDLC/HDLC SERPL: 1.03 {RATIO}
LYMPHOCYTES # BLD AUTO: 1.71 10*3/MM3 (ref 0.7–3.1)
LYMPHOCYTES NFR BLD AUTO: 29.7 % (ref 19.6–45.3)
MCH RBC QN AUTO: 31.3 PG (ref 26.6–33)
MCHC RBC AUTO-ENTMCNC: 34.9 G/DL (ref 31.5–35.7)
MCV RBC AUTO: 89.6 FL (ref 79–97)
MONOCYTES # BLD AUTO: 0.64 10*3/MM3 (ref 0.1–0.9)
MONOCYTES NFR BLD AUTO: 11.1 % (ref 5–12)
NEUTROPHILS NFR BLD AUTO: 3.2 10*3/MM3 (ref 1.7–7)
NEUTROPHILS NFR BLD AUTO: 55.7 % (ref 42.7–76)
PLATELET # BLD AUTO: 165 10*3/MM3 (ref 140–450)
PMV BLD AUTO: 10.5 FL (ref 6–12)
POTASSIUM SERPL-SCNC: 4.2 MMOL/L (ref 3.5–5.2)
PROT SERPL-MCNC: 6.7 G/DL (ref 6–8.5)
RBC # BLD AUTO: 4.54 10*6/MM3 (ref 4.14–5.8)
SODIUM SERPL-SCNC: 140 MMOL/L (ref 136–145)
TRIGL SERPL-MCNC: 105 MG/DL (ref 0–150)
TSH SERPL DL<=0.05 MIU/L-ACNC: 0.85 UIU/ML (ref 0.27–4.2)
VLDLC SERPL-MCNC: 20 MG/DL (ref 5–40)
WBC NRBC COR # BLD: 5.75 10*3/MM3 (ref 3.4–10.8)

## 2023-05-08 PROCEDURE — 36415 COLL VENOUS BLD VENIPUNCTURE: CPT | Performed by: FAMILY MEDICINE

## 2023-05-08 PROCEDURE — 80061 LIPID PANEL: CPT | Performed by: FAMILY MEDICINE

## 2023-05-08 PROCEDURE — 80053 COMPREHEN METABOLIC PANEL: CPT | Performed by: FAMILY MEDICINE

## 2023-05-08 PROCEDURE — 1160F RVW MEDS BY RX/DR IN RCRD: CPT | Performed by: FAMILY MEDICINE

## 2023-05-08 PROCEDURE — 1159F MED LIST DOCD IN RCRD: CPT | Performed by: FAMILY MEDICINE

## 2023-05-08 PROCEDURE — 99214 OFFICE O/P EST MOD 30 MIN: CPT | Performed by: FAMILY MEDICINE

## 2023-05-08 PROCEDURE — 3074F SYST BP LT 130 MM HG: CPT | Performed by: FAMILY MEDICINE

## 2023-05-08 PROCEDURE — 84443 ASSAY THYROID STIM HORMONE: CPT | Performed by: FAMILY MEDICINE

## 2023-05-08 PROCEDURE — 85025 COMPLETE CBC W/AUTO DIFF WBC: CPT | Performed by: FAMILY MEDICINE

## 2023-05-08 PROCEDURE — 3078F DIAST BP <80 MM HG: CPT | Performed by: FAMILY MEDICINE

## 2023-05-08 RX ORDER — CYCLOBENZAPRINE HCL 10 MG
10 TABLET ORAL 2 TIMES DAILY PRN
Qty: 30 TABLET | Refills: 1 | Status: SHIPPED | OUTPATIENT
Start: 2023-05-08 | End: 2023-05-11

## 2023-05-08 RX ORDER — TRAMADOL HYDROCHLORIDE 50 MG/1
50 TABLET ORAL 2 TIMES DAILY PRN
Qty: 30 TABLET | Refills: 1 | Status: SHIPPED | OUTPATIENT
Start: 2023-05-08

## 2023-05-08 NOTE — PROGRESS NOTES
Chief Complaint  Hypertension (6 months)    Subjective          Karan Graham presents to [facility]  History of Present Illness  --TOLERATING BLOOD PRESSURE MEDICATION WITHOUT APPARENT SIDE EFFECTS  --LAST LIPIDS WERE OK, NO ISSUE WITH MEDS  --BREATHING IS STABLE ON CURRENT INHALED MEDS  --GERD IS WELL-CONTROLLED WITH THE PPI  --CHRONIC DIFFUSE JOINT PAIN, CANNOT TAKE NSAIDS DUE TO PLAVIX, ASKING FOR A FEW TRAMADOL AND FLEXERIL.  ALSO WORSENING CHRONIC PAIN IN THE LEFT ANKLE        Allergies   Allergen Reactions   • Codeine Diarrhea and Nausea And Vomiting   • Ibuprofen Nausea And Vomiting        Health Maintenance Due   Topic Date Due   • ZOSTER VACCINE (1 of 2) Never done   • HEPATITIS C SCREENING  Never done   • ANNUAL WELLNESS VISIT  Never done        Current Outpatient Medications on File Prior to Visit   Medication Sig   • albuterol sulfate  (90 Base) MCG/ACT inhaler INHALE 2 PUFFS EVERY 6 HOURS AS NEEDED FOR SHORTNESS OF BREATH   • aspirin 81 MG chewable tablet Chew 1 tablet Daily.   • atorvastatin (LIPITOR) 80 MG tablet Take 1 tablet by mouth Every Night.   • clopidogrel (PLAVIX) 75 MG tablet Take 1 tablet by mouth Daily.   • guaiFENesin (MUCINEX PO) Take  by mouth.   • Loratadine 10 MG capsule Take  by mouth.   • metoprolol succinate XL (TOPROL-XL) 25 MG 24 hr tablet Take 1 tablet by mouth Daily.   • nitroglycerin (NITROSTAT) 0.4 MG SL tablet Place 1 tablet under the tongue Every 5 (Five) Minutes As Needed for Chest Pain. Take no more than 3 doses in 15 minutes.   • omeprazole (priLOSEC) 20 MG capsule Take 1 capsule by mouth Daily.   • sodium-potassium-magnesium sulfates (SUPREP) 17.5-3.13-1.6 GM/177ML solution oral solution TAKE 354 MLS BY MOUTH AS NEEDED   • tamsulosin (FLOMAX) 0.4 MG capsule 24 hr capsule Take 1 capsule by mouth 2 (Two) Times a Day.   • triamcinolone (KENALOG) 0.1 % ointment Apply 1 application topically to the appropriate area as directed 2  "(Two) Times a Day.   • [DISCONTINUED] cyclobenzaprine (FLEXERIL) 10 MG tablet Take 1 tablet by mouth 3 (Three) Times a Day As Needed for Muscle Spasms.   • [DISCONTINUED] melatonin 1 MG tablet Take  by mouth.   • [DISCONTINUED] traMADol (ULTRAM) 50 MG tablet Take 1 tablet by mouth Every 6 (Six) Hours As Needed (Knee pain).     No current facility-administered medications on file prior to visit.       Immunization History   Administered Date(s) Administered   • Pneumococcal Conjugate 13-Valent (PCV13) 08/08/2016   • Pneumococcal Polysaccharide (PPSV23) 10/01/2018       Review of Systems   Constitutional: Negative for activity change, appetite change, chills, fatigue and fever.   HENT: Negative for congestion, ear pain, rhinorrhea and sore throat.    Respiratory: Negative for cough and shortness of breath.    Cardiovascular: Negative for chest pain, palpitations and leg swelling.   Gastrointestinal: Negative for abdominal pain, constipation, diarrhea, nausea and vomiting.   Musculoskeletal: Positive for arthralgias. Negative for myalgias.   Neurological: Negative for headache.        Objective     /70 (BP Location: Left arm, Patient Position: Sitting)   Pulse 74   Ht 177.8 cm (70\")   Wt 99.4 kg (219 lb 3.2 oz)   SpO2 97% Comment: on room air  BMI 31.45 kg/m²       Physical Exam  Vitals and nursing note reviewed.   Constitutional:       General: He is not in acute distress.     Appearance: Normal appearance.   Cardiovascular:      Rate and Rhythm: Normal rate and regular rhythm.      Heart sounds: Normal heart sounds. No murmur heard.  Pulmonary:      Effort: Pulmonary effort is normal.      Breath sounds: Normal breath sounds.   Abdominal:      Palpations: Abdomen is soft.      Tenderness: There is no abdominal tenderness.   Musculoskeletal:      Cervical back: Neck supple.      Right lower leg: No edema.      Left lower leg: No edema.      Comments: DECREASED ROM LEFT ANKLE    Lymphadenopathy:      " Cervical: No cervical adenopathy.   Neurological:      General: No focal deficit present.      Mental Status: He is alert.      Cranial Nerves: No cranial nerve deficit.      Coordination: Coordination normal.      Gait: Gait normal.   Psychiatric:         Mood and Affect: Mood normal.         Behavior: Behavior normal.         Result Review :                             Assessment and Plan      Diagnoses and all orders for this visit:    1. Essential hypertension (Primary)  Assessment & Plan:  Hypertension is improving with treatment.  Continue current treatment regimen.  Continue current medications.  Blood pressure will be reassessed at the next regular appointment.    Orders:  -     CBC & Differential  -     Comprehensive Metabolic Panel  -     TSH Rfx On Abnormal To Free T4    2. GERD without esophagitis  Assessment & Plan:  IMPROVED WITH CURRENT TREATMENT, CONTINUE SAME, WILL REEVALUATE AT NEXT VISIT       3. High cholesterol  Assessment & Plan:  Lipid abnormalities are improving with treatment.  Nutritional counseling was provided.  Lipids will be reassessed in 6 months.    Orders:  -     Lipid Panel    4. Mild intermittent asthma, uncomplicated  Assessment & Plan:  Asthma is improving with treatment.  The patient is experiencing no daytime asthma symptoms. He is experiencing no nighttime asthma symptoms.  Discussed monitoring symptoms and use of quick-relief medications and contacting us early in the course of exacerbations.          5. Generalized osteoarthritis  Assessment & Plan:  INTERMITTENT USE OF TRAMADOL IS WARRANTED AS HE CANNOT TAKE NSAIDS     Orders:  -     traMADol (ULTRAM) 50 MG tablet; Take 1 tablet by mouth 2 (Two) Times a Day As Needed for Moderate Pain (Knee pain).  Dispense: 30 tablet; Refill: 1  -     cyclobenzaprine (FLEXERIL) 10 MG tablet; Take 1 tablet by mouth 2 (Two) Times a Day As Needed for Muscle Spasms.  Dispense: 30 tablet; Refill: 1    6. Chronic pain of left ankle  -      Ambulatory Referral to Podiatry          Follow Up     Return in about 6 months (around 11/8/2023).    Patient was given instructions and counseling regarding his condition or for health maintenance advice. Please see specific information pulled into the AVS if appropriate.

## 2023-05-11 ENCOUNTER — OFFICE VISIT (OUTPATIENT)
Dept: PODIATRY | Facility: CLINIC | Age: 73
End: 2023-05-11
Payer: MEDICARE

## 2023-05-11 VITALS
WEIGHT: 218 LBS | OXYGEN SATURATION: 94 % | BODY MASS INDEX: 31.21 KG/M2 | HEIGHT: 70 IN | SYSTOLIC BLOOD PRESSURE: 116 MMHG | HEART RATE: 68 BPM | TEMPERATURE: 98.2 F | DIASTOLIC BLOOD PRESSURE: 77 MMHG

## 2023-05-11 DIAGNOSIS — M79.672 FOOT PAIN, LEFT: ICD-10-CM

## 2023-05-11 DIAGNOSIS — G89.29 CHRONIC PAIN OF LEFT ANKLE: Primary | ICD-10-CM

## 2023-05-11 DIAGNOSIS — M25.572 CHRONIC PAIN OF LEFT ANKLE: Primary | ICD-10-CM

## 2023-05-11 DIAGNOSIS — M19.079 ANKLE ARTHRITIS: ICD-10-CM

## 2023-05-11 RX ORDER — DEXAMETHASONE SODIUM PHOSPHATE 4 MG/ML
2 INJECTION, SOLUTION INTRA-ARTICULAR; INTRALESIONAL; INTRAMUSCULAR; INTRAVENOUS; SOFT TISSUE ONCE
Status: COMPLETED | OUTPATIENT
Start: 2023-05-11 | End: 2023-05-11

## 2023-05-11 RX ORDER — BUPIVACAINE HYDROCHLORIDE 2.5 MG/ML
0.5 INJECTION, SOLUTION INFILTRATION; PERINEURAL ONCE
Status: COMPLETED | OUTPATIENT
Start: 2023-05-11 | End: 2023-05-11

## 2023-05-11 RX ADMIN — DEXAMETHASONE SODIUM PHOSPHATE 2 MG: 4 INJECTION, SOLUTION INTRA-ARTICULAR; INTRALESIONAL; INTRAMUSCULAR; INTRAVENOUS; SOFT TISSUE at 08:43

## 2023-05-11 RX ADMIN — BUPIVACAINE HYDROCHLORIDE 0.5 ML: 2.5 INJECTION, SOLUTION INFILTRATION; PERINEURAL at 08:43

## 2023-05-11 NOTE — PROGRESS NOTES
[facility] - PODIATRY    Today's Date: 05/11/23    Patient Name: Karan Graham  MRN: [id number]  CSN: [id number]  PCP: Marshal Light MD  Referring Provider: Marshal Light*    SUBJECTIVE     Chief Complaint   Patient presents with   • Left Foot - Establish Care, Pain     Patient reports 3 months ago he kicked a lift on a tractor. Pain comes and goes unless he is very active and the pain increases. He has been trying the topical voltaren gel and tylenol for pain relief.    • Left Ankle - Establish Care, Pain     HPI: Karan Graham, a 72 y.o.male, presents to clinic.    New, Established, New Problem: New    Location: Left ankle pain    Duration: January 2023    Onset: Acute, occurred from when he kicked a left on his tractor.  Patient relates he had a left ankle injury in 2000.    Nature: Sore, stiff, achy    Stable, worsening, improving: Stable, no improvement    Aggravating factors:  Patient relates pain is aggravated by shoe gear and ambulation.      Previous Treatment: X-ray in January    Patient denies any fevers, chills, nausea, vomiting, shortness of breath, nor any other constitutional signs nor symptoms.    Patient relates going to pain management for lumbar injections.    Past Medical History:   Diagnosis Date   • Asthma    • CAD (coronary artery disease)     hx stents june 2020   • Callus    • Clotting disorder     Blood thinners   • GERD (gastroesophageal reflux disease) Many years ago   • Hypertension    • Pancreas (digestive gland) works poorly    • Skin cancer    • Sleep apnea      Past Surgical History:   Procedure Laterality Date   • BACK SURGERY     • CARDIAC CATHETERIZATION  06/2020   • CARDIAC CATHETERIZATION N/A 2/21/2023    Procedure: Left Heart Cath;  Surgeon: Eduard Pedroza MD;  Location: [facility] CATH INVASIVE LOCATION;  Service: Cardiovascular;  Laterality: N/A;   • CARDIAC CATHETERIZATION N/A 2/21/2023    Procedure: Coronary angiography;  Surgeon:  Eduard Pedroza MD;  Location: [facility] INVASIVE LOCATION;  Service: Cardiovascular;  Laterality: N/A;   • CHOLECYSTECTOMY     • COLONOSCOPY  2020    NORMAL   • CORONARY ANGIOPLASTY WITH STENT PLACEMENT      TWICE   • FRACTURE SURGERY  Various   • HEMORRHOIDECTOMY     • PANCREAS SURGERY      STENTING   • SHOULDER SURGERY     • SINUS SURGERY  2022    Deviated septum   • TOE SURGERY Right     big toe   • TONSILLECTOMY     • TUMOR REMOVAL      [place]     Family History   Problem Relation Age of Onset   • Hypertension Mother    • Leukemia Mother    • No Known Problems Father    • Hypertension Sister    • Heart disease Brother    • Cancer Brother         Colon cancer   • Cancer Brother         Prostate   • Cancer Brother         Lung cancer   • Cancer Brother         Lung cancer   • No Known Problems Maternal Aunt    • No Known Problems Maternal Uncle    • No Known Problems Paternal Aunt    • No Known Problems Paternal Uncle    • No Known Problems Maternal Grandmother    • No Known Problems Maternal Grandfather    • No Known Problems Paternal Grandmother    • No Known Problems Paternal Grandfather    • Cancer Other    • Anesthesia problems Neg Hx    • Broken bones Neg Hx    • Clotting disorder Neg Hx    • Collagen disease Neg Hx    • Diabetes Neg Hx    • Dislocations Neg Hx    • Osteoporosis Neg Hx    • Rheumatologic disease Neg Hx    • Scoliosis Neg Hx    • Severe sprains Neg Hx      Social History     Socioeconomic History   • Marital status:    Tobacco Use   • Smoking status: Never   • Smokeless tobacco: Never   Vaping Use   • Vaping Use: Never used   Substance and Sexual Activity   • Alcohol use: Yes     Comment: Don’t drink weekly   • Drug use: No   • Sexual activity: Yes     Partners: Female     Birth control/protection: None     Allergies   Allergen Reactions   • Codeine Diarrhea and Nausea And Vomiting   • Ibuprofen Nausea And Vomiting     Current Outpatient Medications   Medication Sig  Dispense Refill   • albuterol sulfate  (90 Base) MCG/ACT inhaler INHALE 2 PUFFS EVERY 6 HOURS AS NEEDED FOR SHORTNESS OF BREATH     • aspirin 81 MG chewable tablet Chew 1 tablet Daily.     • atorvastatin (LIPITOR) 80 MG tablet Take 1 tablet by mouth Every Night. 30 tablet 0   • clopidogrel (PLAVIX) 75 MG tablet Take 1 tablet by mouth Daily.     • guaiFENesin (MUCINEX PO) Take  by mouth.     • Loratadine 10 MG capsule Take  by mouth.     • metoprolol succinate XL (TOPROL-XL) 25 MG 24 hr tablet Take 1 tablet by mouth Daily. 90 tablet 3   • nitroglycerin (NITROSTAT) 0.4 MG SL tablet Place 1 tablet under the tongue Every 5 (Five) Minutes As Needed for Chest Pain. Take no more than 3 doses in 15 minutes.     • omeprazole (priLOSEC) 20 MG capsule Take 1 capsule by mouth Daily.     • tamsulosin (FLOMAX) 0.4 MG capsule 24 hr capsule Take 1 capsule by mouth 2 (Two) Times a Day.     • traMADol (ULTRAM) 50 MG tablet Take 1 tablet by mouth 2 (Two) Times a Day As Needed for Moderate Pain (Knee pain). 30 tablet 1   • triamcinolone (KENALOG) 0.1 % ointment Apply 1 application topically to the appropriate area as directed 2 (Two) Times a Day. 30 g 2   • sodium-potassium-magnesium sulfates (SUPREP) 17.5-3.13-1.6 GM/177ML solution oral solution TAKE 354 MLS BY MOUTH AS NEEDED (Patient not taking: Reported on 5/11/2023)       Current Facility-Administered Medications   Medication Dose Route Frequency Provider Last Rate Last Admin   • bupivacaine (MARCAINE) 0.25 % injection 0.5 mL  0.5 mL Injection Once Bjorn Jerry DPM       • dexamethasone sodium phosphate injection 2 mg  2 mg Intramuscular Once Bjorn Jerry DPM         Review of Systems   Constitutional: Negative.    Musculoskeletal:        Left ankle pain   All other systems reviewed and are negative.      OBJECTIVE     Vitals:    05/11/23 0758   BP: 116/77   Pulse: 68   Temp: 98.2 °F (36.8 °C)   SpO2: 94%       WBC   Date Value Ref Range Status    05/08/2023 5.75 3.40 - 10.80 10*3/mm3 Final   04/04/2022 6.24 4.5 - 11.0 10*3/uL Final     RBC   Date Value Ref Range Status   05/08/2023 4.54 4.14 - 5.80 10*6/mm3 Final   04/04/2022 4.43 (L) 4.5 - 5.9 10*6/uL Final     Hemoglobin   Date Value Ref Range Status   05/08/2023 14.2 13.0 - 17.7 g/dL Final   04/04/2022 13.5 13.5 - 17.5 g/dL Final     Hematocrit   Date Value Ref Range Status   05/08/2023 40.7 37.5 - 51.0 % Final   04/04/2022 40.3 (L) 41.0 - 53.0 % Final     MCV   Date Value Ref Range Status   05/08/2023 89.6 79.0 - 97.0 fL Final   04/04/2022 91.0 80.0 - 100.0 fL Final     MCH   Date Value Ref Range Status   05/08/2023 31.3 26.6 - 33.0 pg Final   04/04/2022 30.5 26.0 - 34.0 pg Final     MCHC   Date Value Ref Range Status   05/08/2023 34.9 31.5 - 35.7 g/dL Final   04/04/2022 33.5 31.0 - 37.0 g/dL Final     RDW   Date Value Ref Range Status   05/08/2023 12.4 12.3 - 15.4 % Final   04/04/2022 12.6 12.0 - 16.8 % Final     RDW-SD   Date Value Ref Range Status   05/08/2023 40.7 37.0 - 54.0 fl Final     MPV   Date Value Ref Range Status   05/08/2023 10.5 6.0 - 12.0 fL Final   04/04/2022 10.8 8.4 - 12.4 fL Final     Platelets   Date Value Ref Range Status   05/08/2023 165 140 - 450 10*3/mm3 Final   04/04/2022 157 140 - 440 10*3/uL Final     Neutrophil Rel %   Date Value Ref Range Status   04/04/2022 59.5 45 - 80 % Final     Neutrophil %   Date Value Ref Range Status   05/08/2023 55.7 42.7 - 76.0 % Final     Lymphocyte Rel %   Date Value Ref Range Status   04/04/2022 26.6 15 - 50 % Final     Lymphocyte %   Date Value Ref Range Status   05/08/2023 29.7 19.6 - 45.3 % Final     Monocyte Rel %   Date Value Ref Range Status   04/04/2022 11.2 0 - 15 % Final     Monocyte %   Date Value Ref Range Status   05/08/2023 11.1 5.0 - 12.0 % Final     Eosinophil %   Date Value Ref Range Status   05/08/2023 2.8 0.3 - 6.2 % Final   04/04/2022 1.6 0 - 7 % Final     Basophil Rel %   Date Value Ref Range Status   04/04/2022 0.8 0 - 2 %  Final     Basophil %   Date Value Ref Range Status   05/08/2023 0.7 0.0 - 1.5 % Final     Immature Grans %   Date Value Ref Range Status   05/08/2023 0.0 0.0 - 0.5 % Final   04/04/2022 0.3 0.0 - 1.0 % Final     Neutrophils Absolute   Date Value Ref Range Status   04/04/2022 3.71 2.0 - 8.8 10*3/uL Final     Neutrophils, Absolute   Date Value Ref Range Status   05/08/2023 3.20 1.70 - 7.00 10*3/mm3 Final     Lymphocytes Absolute   Date Value Ref Range Status   04/04/2022 1.66 0.7 - 5.5 10*3/uL Final     Lymphocytes, Absolute   Date Value Ref Range Status   05/08/2023 1.71 0.70 - 3.10 10*3/mm3 Final     Monocytes Absolute   Date Value Ref Range Status   04/04/2022 0.70 0.0 - 1.7 10*3/uL Final     Monocytes, Absolute   Date Value Ref Range Status   05/08/2023 0.64 0.10 - 0.90 10*3/mm3 Final     Eosinophils Absolute   Date Value Ref Range Status   04/04/2022 0.10 0.0 - 0.8 10*3/uL Final     Eosinophils, Absolute   Date Value Ref Range Status   05/08/2023 0.16 0.00 - 0.40 10*3/mm3 Final     Basophils Absolute   Date Value Ref Range Status   04/04/2022 0.05 0.0 - 0.2 10*3/uL Final     Basophils, Absolute   Date Value Ref Range Status   05/08/2023 0.04 0.00 - 0.20 10*3/mm3 Final     Immature Grans, Absolute   Date Value Ref Range Status   05/08/2023 0.00 0.00 - 0.05 10*3/mm3 Final   04/04/2022 0.02 0.00 - 0.10 10*3/uL Final     nRBC   Date Value Ref Range Status   04/04/2022 0 0 /100(WBC) Final   09/09/2020 0.0 0.0 - 0.2 /100 WBC Final         Lab Results   Component Value Date    GLUCOSE 106 (H) 05/08/2023    BUN 12 05/08/2023    CREATININE 0.84 05/08/2023    EGFR 93.8 05/08/2023    BCR 14.3 05/08/2023    K 4.2 05/08/2023    CO2 24.0 05/08/2023    CALCIUM 9.1 05/08/2023    ALBUMIN 4.9 05/08/2023    BILITOT 0.9 05/08/2023    AST 29 05/08/2023    ALT 26 05/08/2023       Patient seen in no apparent distress.      PHYSICAL EXAM:     Foot/Ankle Exam    GENERAL  Appearance:  appears stated age  Orientation:  AAOx3  Affect:   appropriate  Gait:  unimpaired  Assistance:  independent  Right shoe gear: casual shoe  Left shoe gear: casual shoe    VASCULAR     Right Foot Vascularity   Dorsalis pedis:  2+  Posterior tibial:  2+  Skin temperature:  warm  Edema gradin+  CFT:  < 3 seconds  Pedal hair growth:  Present  Varicosities:  mild varicosities     Left Foot Vascularity   Dorsalis pedis:  2+  Posterior tibial:  2+  Skin temperature:  warm  Edema gradin+ and pitting  CFT:  < 3 seconds  Pedal hair growth:  Present  Varicosities:  mild varicosities     NEUROLOGIC     Right Foot Neurologic   Normal sensation    Light touch sensation: normal  Vibratory sensation: normal  Hot/Cold sensation: normal  Protective Sensation using [place] Monofilament:   Sites intact: 10  Sites tested: 10     Left Foot Neurologic   Normal sensation    Light touch sensation: normal  Vibratory sensation: normal  Hot/Cold sensation:  normal  Protective Sensation using [place] Monofilament:   Sites intact: 10  Sites tested: 10    MUSCULOSKELETAL     Left Foot Musculoskeletal   Ecchymosis:  none  Tenderness:  (Anterior ankle joint)    MUSCLE STRENGTH     Right Foot Muscle Strength   Foot dorsiflexion:  4  Foot plantar flexion:  4  Foot inversion:  4  Foot eversion:  4     Left Foot Muscle Strength   Foot dorsiflexion:  4  Foot plantar flexion:  4  Foot inversion:  4  Foot eversion:  4    RANGE OF MOTION     Right Foot Range of Motion   Foot and ankle ROM within normal limits       Left Foot Range of Motion   Foot and ankle ROM within normal limits    Ankle dorsiflexion: with pain  Ankle plantar flexion: with pain    DERMATOLOGIC      Right Foot Dermatologic   Skin  Right foot skin is intact.      Left Foot Dermatologic   Skin  Left foot skin is intact.     TESTS     Right Foot Tests   Anterior drawer: negative     Left Foot Tests   Anterior drawer: negative      RADIOLOGY:      Ankle radiographs in 2023 report the following:Three views  of the left ankle obtained and processed in the office today   show early osteophyte formation through the medial and lateral aspect of   the ankle joint itself.  There is no evidence of severe osteoarthritis at   the talar dome.  There is no fracture or dislocation noted.      ASSESSMENT/PLAN     Diagnoses and all orders for this visit:    1. Chronic pain of left ankle (Primary)    2. Foot pain, left    3. Ankle arthritis  -     dexamethasone sodium phosphate injection 2 mg  -     bupivacaine (MARCAINE) 0.25 % injection 0.5 mL        Comprehensive lower extremity examination and evaluation was performed.    Discussed findings and treatment plan including risks, benefits, and treatment options with patient in detail. Patient agreed with treatment plan.    Medications and allergies reviewed.  Reviewed available lab values along with other pertinent labs.  These were discussed with the patient.    Patient instructed utilize OTC elastic ankle ankle brace as needed.  The patient states understanding and agreement with this plan.    Injection  Date/Time: 05/11/2023  Performed by: Bjorn Jerry DPM  Authorized by: Bjorn Jerry DPM     Consent: Verbal consent obtained. Written consent obtained.  Risks and benefits: risks, benefits and alternatives were discussed  Consent given by: patient  Patient identity confirmed: verbally with patient  Indications: pain relief    Betadine prep x 3 to the planned injection site.    Injection site: Left ankle    Sedation:  Patient sedated: no    Patient position: sitting  Needle size: 27 G  Local anesthetic: 1.0 mL 0.25% Marcaine plain and 1.0 mL Decadron 4 mg/mL.   Outcome: pain improved  Patient tolerance: Patient tolerated the procedure well with no immediate complications      An After Visit Summary was printed and given to the patient at discharge, including (if requested) any available informative/educational handouts regarding diagnosis, treatment, or medications. All  questions were answered to patient/family satisfaction. Should symptoms fail to improve or worsen they agree to call or return to clinic or to go to the Emergency Department. Discussed the importance of following up with any needed screening tests/labs/specialist appointments and any requested follow-up recommended by me today. Importance of maintaining follow-up discussed and patient accepts that missed appointments can delay diagnosis and potentially lead to worsening of conditions.    Return if symptoms worsen or fail to improve., or sooner if acute issues arise.    This document has been electronically signed by Bjorn Jerry DPM on May 11, 2023 08:28 EDT

## 2023-05-18 ENCOUNTER — TELEPHONE (OUTPATIENT)
Dept: CARDIOLOGY | Facility: CLINIC | Age: 73
End: 2023-05-18
Payer: MEDICARE

## 2023-05-18 NOTE — TELEPHONE ENCOUNTER
Received paperwork from [facility]. Patient is scheduled for a colonscopy on 05/31/23 with . They are needing cardiac clearance.      He does take Plavix 75 mg and a low dose aspirin. Can they proceed?    Their fax number [phone]

## 2023-05-22 NOTE — TELEPHONE ENCOUNTER
Patient may proceed forward with colonoscopy as planned.  He may hold his clopidogrel as needed for surgery with restarting it once safe from a postprocedural bleeding standpoint.  Would recommend continue low-dose aspirin.

## 2023-05-30 RX ORDER — ATORVASTATIN CALCIUM 80 MG/1
80 TABLET, FILM COATED ORAL NIGHTLY
Qty: 30 TABLET | Refills: 0 | Status: SHIPPED | OUTPATIENT
Start: 2023-05-30

## 2023-08-03 NOTE — ASSESSMENT & PLAN NOTE
Asthma is improving with treatment.  The patient is experiencing no daytime asthma symptoms. He is experiencing no nighttime asthma symptoms.  Discussed monitoring symptoms and use of quick-relief medications and contacting us early in the course of exacerbations.       no Active ROM deficits were identified

## 2023-08-10 ENCOUNTER — OFFICE VISIT (OUTPATIENT)
Dept: CARDIOLOGY | Facility: CLINIC | Age: 73
End: 2023-08-10
Payer: MEDICARE

## 2023-08-10 VITALS
SYSTOLIC BLOOD PRESSURE: 130 MMHG | DIASTOLIC BLOOD PRESSURE: 70 MMHG | HEART RATE: 64 BPM | HEIGHT: 70 IN | WEIGHT: 221 LBS | BODY MASS INDEX: 31.64 KG/M2

## 2023-08-10 DIAGNOSIS — E78.00 HIGH CHOLESTEROL: ICD-10-CM

## 2023-08-10 DIAGNOSIS — I25.118 CORONARY ARTERY DISEASE OF NATIVE ARTERY OF NATIVE HEART WITH STABLE ANGINA PECTORIS: Primary | ICD-10-CM

## 2023-08-10 DIAGNOSIS — I10 ESSENTIAL HYPERTENSION: ICD-10-CM

## 2023-08-10 PROCEDURE — 99214 OFFICE O/P EST MOD 30 MIN: CPT | Performed by: INTERNAL MEDICINE

## 2023-08-10 PROCEDURE — 3075F SYST BP GE 130 - 139MM HG: CPT | Performed by: INTERNAL MEDICINE

## 2023-08-10 PROCEDURE — 1159F MED LIST DOCD IN RCRD: CPT | Performed by: INTERNAL MEDICINE

## 2023-08-10 PROCEDURE — 3078F DIAST BP <80 MM HG: CPT | Performed by: INTERNAL MEDICINE

## 2023-08-10 PROCEDURE — 93000 ELECTROCARDIOGRAM COMPLETE: CPT | Performed by: INTERNAL MEDICINE

## 2023-08-10 PROCEDURE — 1160F RVW MEDS BY RX/DR IN RCRD: CPT | Performed by: INTERNAL MEDICINE

## 2023-08-10 RX ORDER — RANOLAZINE 500 MG/1
500 TABLET, EXTENDED RELEASE ORAL 2 TIMES DAILY
Qty: 180 TABLET | Refills: 3 | Status: SHIPPED | OUTPATIENT
Start: 2023-08-10

## 2023-08-10 NOTE — PROGRESS NOTES
[facility] Follow Up Office Note     Encounter Date:08/10/23  Patient:Karan Graham  :1950  MRN:[id number]      Chief Complaint:   Chief Complaint   Patient presents with    Coronary Artery Disease     3 month f/u     History of Presenting Illness:      Mr. Graham is a 73 y.o. gentleman with past medical history notable for coronary artery disease status post percutaneous intervention, mixed hyperlipidemia, obstructive sleep apnea not on CPAP, gastric reflux disease, easy bruising, and cavernous sinus tumor who presents to our office for scheduled follow-up.  Unfortunately since her last visit despite even undergoing cardiac catheterization another cardiac testing all of which have been normal he is still very short of breath.  Outside of that no new issues      Review of Systems:  Review of Systems   Constitutional: Positive for malaise/fatigue.   HENT: Negative.     Eyes: Negative.    Cardiovascular:  Positive for dyspnea on exertion.   Respiratory:  Positive for shortness of breath.    Endocrine: Negative.    Hematologic/Lymphatic: Bruises/bleeds easily.   Skin: Negative.    Musculoskeletal: Negative.    Gastrointestinal: Negative.    Genitourinary: Negative.    Neurological: Negative.    Psychiatric/Behavioral: Negative.     Allergic/Immunologic: Negative.      Current Outpatient Medications on File Prior to Visit   Medication Sig Dispense Refill    albuterol sulfate  (90 Base) MCG/ACT inhaler INHALE 2 PUFFS EVERY 6 HOURS AS NEEDED FOR SHORTNESS OF BREATH      aspirin 81 MG chewable tablet Chew 1 tablet Daily.      atorvastatin (LIPITOR) 80 MG tablet TAKE 1 TABLET BY MOUTH EVERY NIGHT 90 tablet 0    clopidogrel (PLAVIX) 75 MG tablet Take 1 tablet by mouth Daily.      guaiFENesin (MUCINEX PO) Take  by mouth.      Loratadine 10 MG capsule Take  by mouth.      metoprolol succinate XL (TOPROL-XL) 25 MG 24 hr tablet Take 1 tablet by mouth Daily. 90 tablet 3    nitroglycerin (NITROSTAT) 0.4  MG SL tablet Place 1 tablet under the tongue Every 5 (Five) Minutes As Needed for Chest Pain. Take no more than 3 doses in 15 minutes.      omeprazole (priLOSEC) 20 MG capsule Take 1 capsule by mouth Daily.      tamsulosin (FLOMAX) 0.4 MG capsule 24 hr capsule Take 1 capsule by mouth 2 (Two) Times a Day.      traMADol (ULTRAM) 50 MG tablet Take 1 tablet by mouth 2 (Two) Times a Day As Needed for Moderate Pain (Knee pain). (Patient taking differently: Take 1 tablet by mouth As Needed for Moderate Pain (Knee pain).) 30 tablet 1    triamcinolone (KENALOG) 0.1 % ointment Apply 1 application topically to the appropriate area as directed 2 (Two) Times a Day. 30 g 2    sodium-potassium-magnesium sulfates (SUPREP) 17.5-3.13-1.6 GM/177ML solution oral solution TAKE 354 MLS BY MOUTH AS NEEDED (Patient not taking: Reported on 5/11/2023)       No current facility-administered medications on file prior to visit.       Allergies   Allergen Reactions    Codeine Diarrhea and Nausea And Vomiting    Ibuprofen Nausea And Vomiting       Past Medical History:   Diagnosis Date    Asthma     CAD (coronary artery disease)     hx stents june 2020    Callus     Clotting disorder     Blood thinners    GERD (gastroesophageal reflux disease) Many years ago    Hypertension     Pancreas (digestive gland) works poorly     Skin cancer     Sleep apnea        Past Surgical History:   Procedure Laterality Date    BACK SURGERY      CARDIAC CATHETERIZATION  06/2020    CARDIAC CATHETERIZATION N/A 2/21/2023    Procedure: Left Heart Cath;  Surgeon: Eduard Pedroza MD;  Location:  [facility];  Service: Cardiovascular;  Laterality: N/A;    CARDIAC CATHETERIZATION N/A 2/21/2023    Procedure: Coronary angiography;  Surgeon: Eduard Pedrzoa MD;  Location:  [facility];  Service: Cardiovascular;  Laterality: N/A;    CHOLECYSTECTOMY      COLONOSCOPY  2020    NORMAL    CORONARY ANGIOPLASTY WITH STENT PLACEMENT      TWICE     "FRACTURE SURGERY  Various    HEMORRHOIDECTOMY      PANCREAS SURGERY      STENTING    SHOULDER SURGERY      SINUS SURGERY  2022    Deviated septum    TOE SURGERY Right     big toe    TONSILLECTOMY      TUMOR REMOVAL      Roof Of Mouth       Social History     Socioeconomic History    Marital status:    Tobacco Use    Smoking status: Never    Smokeless tobacco: Never   Vaping Use    Vaping Use: Never used   Substance and Sexual Activity    Alcohol use: Yes     Comment: Don't drink weekly    Drug use: No    Sexual activity: Yes     Partners: Female     Birth control/protection: None       Family History   Problem Relation Age of Onset    Hypertension Mother     Leukemia Mother     No Known Problems Father     Hypertension Sister     Heart disease Brother     Cancer Brother         Colon cancer    Cancer Brother         Prostate    Cancer Brother         Lung cancer    Cancer Brother         Lung cancer    No Known Problems Maternal Aunt     No Known Problems Maternal Uncle     No Known Problems Paternal Aunt     No Known Problems Paternal Uncle     No Known Problems Maternal Grandmother     No Known Problems Maternal Grandfather     No Known Problems Paternal Grandmother     No Known Problems Paternal Grandfather     Cancer Other     Anesthesia problems Neg Hx     Broken bones Neg Hx     Clotting disorder Neg Hx     Collagen disease Neg Hx     Diabetes Neg Hx     Dislocations Neg Hx     Osteoporosis Neg Hx     Rheumatologic disease Neg Hx     Scoliosis Neg Hx     Severe sprains Neg Hx        The following portions of the patient's history were reviewed and updated as appropriate: allergies, current medications, past family history, past medical history, past social history, past surgical history and problem list.       Objective:       Vitals:    08/10/23 1041   BP: 130/70   BP Location: Left arm   Patient Position: Sitting   Pulse: 64   Weight: 100 kg (221 lb)   Height: 177.8 cm (70\")       Body mass index is " 31.71 kg/mý.    Physical Exam:  Constitutional: Well appearing, Well-developed, No acute distress   HENT: Oropharynx clear and membrane moist  Eyes: Normal conjunctiva, no sclera icterus.  Neck: Supple, no carotid bruit bilaterally.  Cardiovascular: Regular rate and rhythm, No Murmur, Trace bilateral lower extremity edema.  Pulmonary: Normal respiratory effort, normal lung sounds, no wheezing.  Neurological: Alert and orient x 3.   Skin: Warm, dry, no ecchymosis, no rash.  Psych: Appropriate mood and affect. Normal judgment and insight.       Lab Results   Component Value Date     05/08/2023     02/08/2023    K 4.2 05/08/2023    K 4.4 02/08/2023     05/08/2023     02/08/2023    CO2 24.0 05/08/2023    CO2 28.0 02/08/2023    BUN 12 05/08/2023    BUN 12 02/08/2023    CREATININE 0.84 05/08/2023    CREATININE 0.90 02/08/2023    EGFRIFNONA 99 09/09/2020    EGFRIFNONA 95 09/08/2020    GLUCOSE 106 (H) 05/08/2023    GLUCOSE 92 02/08/2023    CALCIUM 9.1 05/08/2023    CALCIUM 9.2 02/08/2023    ALBUMIN 4.9 05/08/2023    ALBUMIN 4.4 02/08/2023    BILITOT 0.9 05/08/2023    BILITOT 1.0 02/08/2023    AST 29 05/08/2023    AST 21 02/08/2023    ALT 26 05/08/2023    ALT 24 02/08/2023     Lab Results   Component Value Date    WBC 5.75 05/08/2023    WBC 6.30 02/08/2023    HGB 14.2 05/08/2023    HGB 14.1 02/08/2023    HCT 40.7 05/08/2023    HCT 40.2 02/08/2023    MCV 89.6 05/08/2023    MCV 87.6 02/08/2023     05/08/2023     02/08/2023     Lab Results   Component Value Date    CHOL 92 05/08/2023    CHOL 95 08/12/2022    TRIG 105 05/08/2023    TRIG 111 08/12/2022    HDL 35 (L) 05/08/2023    HDL 31 (L) 08/12/2022    LDL 37 05/08/2023    LDL 43 08/12/2022     Lab Results   Component Value Date    PROBNP 28.7 02/08/2023    PROBNP 34.7 03/23/2017    BNP <11.1 08/09/2019     Lab Results   Component Value Date    CKTOTAL 53 03/22/2017    CKMB 1.32 03/22/2017    TROPONINI <0.012 08/09/2019    TROPONINT <0.010  03/23/2017     Lab Results   Component Value Date    TSH 0.853 05/08/2023    TSH 1.190 08/12/2022         ECG 12 Lead    Date/Time: 8/10/2023 11:58 AM  Performed by: Eduard Pedroza MD  Authorized by: Eduard Pedroza MD   Comparison: compared with previous ECG from 4/24/2023  Similar to previous ECG  Rhythm: sinus rhythm  Conduction: right bundle branch block        Cardiac catheterization 2/23/2023 with images reviewed by myself:  No significant coronary artery disease with patent stents in the proximal to mid LAD as well as distal RCA and the PDA with no significant in-stent restenosis.  Circumflex contains luminal irregularities.  There is some small vessel disease within the very distal circumflex but small in caliber size and distribution.  Normal LVEDP of 6 mmHg    Cardiac stress test 9/22/2022:  Myocardial perfusion imaging indicates a normal myocardial perfusion study with no evidence of ischemia.  Left ventricular ejection fraction is hyperdynamic (Calculated EF > 70%). .  Impressions are consistent with a low risk study.    Echocardiogram 9/22/2022 images reviewed by myself:  Calculated left ventricular EF = 68.4%. Left ventricular systolic function is normal.All left ventricular wall segments contract normally.Normal Global longitudinal LV strain (GLS) = -23.2%.  Left ventricular diastolic function was normal.    Stent card 8/13/2020:  Xience RALEIGH 2.5 x 23 mm placed to distal RCA    Stent card 6/26/2020:  Promus RALEIGH placed LAD Dr. Cox at [facility]    Echocardiogram 6/22/2018 [facility]:  Overall left ventricular function is normal. The estimated ejection fraction is 60-65%.   Trace-to-mild mitral regurgitation is present.   Patent foramen ovale.     Carotid duplex 6/21/2018 [facility]:  Bilateral internal carotid arteries with plaque but <50% stenosis.  Bilateral vertebral arteries with antegrade flow.  No prior examination for comparison.           Assessment:          Diagnosis  Plan   1. Coronary artery disease of native artery of native heart with stable angina pectoris  ECG 12 Lead      2. High cholesterol        3. Essential hypertension               Plan:       Mr. Graham is a 73 y.o. gentleman with past medical history notable for coronary artery disease status post percutaneous intervention, mixed hyperlipidemia, obstructive sleep apnea not on CPAP, gastric reflux disease, easy bruising, and cavernous sinus tumor who presents to our office for scheduled follow-up.  Overall patient is stable.  I will try little Ranexa as he did not tolerate starting Imdur due to headaches.  He is already got a little bit of leg swelling I would avoid amlodipine.  We will plan on seeing him back in 6 months.      Coronary artery disease with stable angina:  Stress test 9/2022 demonstrates no evidence of ischemia and follow up heart catheterization with no epicardial disease with patent stent.  Had headache with Imdur  Will avoid amlodipine given leg swelling  Will try Ranexa   Continue dual antiplatelet therapy  Continue statin  Continue low-dose beta-blocker    Essential hypertension:  Blood pressure well controlled no changes needed at this time    Mixed hyperlipidemia:  Continue statin therapy  LDL well controlled 8/2022  CMP with normal ALT and AST 8/2022      Follow-up:  6 months      Thank you for allowing me to participate in the care of Karan Graham. Feel free to contact me directly with any further questions or concerns.    Eduard Pedroza MD  [facility]  08/10/23  11:59 EDT

## 2023-08-22 NOTE — ASSESSMENT & PLAN NOTE
Lipid abnormalities are improving with treatment.  Nutritional counseling was provided.  Lipids will be reassessed in 6 months.   No

## 2023-09-06 ENCOUNTER — TELEPHONE (OUTPATIENT)
Dept: CARDIOLOGY | Facility: CLINIC | Age: 73
End: 2023-09-06
Payer: MEDICARE

## 2023-09-06 NOTE — TELEPHONE ENCOUNTER
Called and spoke with wife. Patient stated that the Ranexa would of cost him $192. So he did not pay for it. Are there any other options?     The wife can be reached at [phone] or He can be reached at [phone]

## 2023-09-06 NOTE — TELEPHONE ENCOUNTER
Called talked with wife and explained Ranexa can sometimes help out with chronic angina to improve symptoms but is not something that would decrease mortality or if he does not take it would hurt him would be reasonable if he wants to watch his symptoms further to hold off on picking up the Ranexa if it is going to cost him too much money she was going to talk with her  and decide.  Otherwise we will see him back as scheduled

## 2023-10-02 NOTE — TELEPHONE ENCOUNTER
Rx Refill Note  Requested Prescriptions     Pending Prescriptions Disp Refills    meloxicam (MOBIC) 7.5 MG tablet [Pharmacy Med Name: MELOXICAM 7.5MG TABLETS] 30 tablet 1     Sig: TAKE 1 TABLET BY MOUTH DAILY      Last office visit with prescribing clinician: 5/8/2023   Last telemedicine visit with prescribing clinician: Visit date not found   Next office visit with prescribing clinician: 11/13/2023  Comprehensive Metabolic Panel (05/08/2023 10:53)     Dr Light is out of the office until 10/10/23, sent to on call Dr Nathan.     Aleja Lynn, ELENI  10/02/23, 12:20 EDT

## 2023-10-02 NOTE — TELEPHONE ENCOUNTER
Please confirm that patient has been taking this medication regularly.  It does have potential to interact with clopidogrel and cause ulcers.  There can also be some potential heart and stroke risk over the long-term with use of it.  If I do send a refill, it will be a short-term refill only until Dr. Light returns.  Thanks.

## 2023-10-04 ENCOUNTER — READMISSION MANAGEMENT (OUTPATIENT)
Dept: CALL CENTER | Facility: HOSPITAL | Age: 73
End: 2023-10-04
Payer: MEDICARE

## 2023-10-04 RX ORDER — MELOXICAM 7.5 MG/1
TABLET ORAL
Qty: 30 TABLET | Refills: 0 | Status: SHIPPED | OUTPATIENT
Start: 2023-10-04

## 2023-10-04 NOTE — OUTREACH NOTE
Prep Survey      Flowsheet Row Responses   [facility] facility patient discharged from? Non-[facility]   Is LACE score < 7 ? Non-BH Discharge   Eligibility [facility]   Date of Admission 10/01/23   Date of Discharge 10/04/23   Discharge Disposition Home or Self Care   Discharge diagnosis Lower GIB   Does the patient have one of the following disease processes/diagnoses(primary or secondary)? Other   Prep survey completed? Yes            Willow CASTILLO - Registered Nurse

## 2023-10-04 NOTE — TELEPHONE ENCOUNTER
Pt informed.  He said he doesn't take it but about once a week because of his blood thinner.  He said he has been out of it for about 3 weeks.  He takes it for his back pain.

## 2023-10-05 ENCOUNTER — TRANSITIONAL CARE MANAGEMENT TELEPHONE ENCOUNTER (OUTPATIENT)
Dept: CALL CENTER | Facility: HOSPITAL | Age: 73
End: 2023-10-05
Payer: MEDICARE

## 2023-10-05 NOTE — OUTREACH NOTE
Call Center TCM Note      Flowsheet Row Responses   [facility] patient discharged from? Non-BH  [[facility]]   Does the patient have one of the following disease processes/diagnoses(primary or secondary)? Other   TCM attempt successful? Yes   Call start time 1559   Call end time 1606   Discharge diagnosis Lower GIB   Medication alerts for this patient PLAVIX and ASA held for two weeks   Meds reviewed with patient/caregiver? Yes   Is the patient having any side effects they believe may be caused by any medication additions or changes? No   Does the patient have all medications ordered at discharge? Yes   Is the patient taking all medications as directed (includes completed medication regime)? Yes   Medication comments Augmentin started and Omeprazole   Comments Wife has called PCP office and pt will be following as scheduled   Does the patient have an appointment with their PCP within 7-14 days of discharge? No   Nursing Interventions Confirmed date/time of appointment, Patient declined scheduling/rescheduling appointment at this time   Has home health visited the patient within 72 hours of discharge? N/A   Psychosocial issues? No   What is the patient's perception of their health status since discharge? Improving  [Reports no more bloody BMS at this time and will monitor,will slowly advance diet as tolerated as still having some stomach pain and distention.  Plans on f/u with Dr. Ricci.]   Is the patient/caregiver able to teach back signs and symptoms related to disease process for when to call PCP? Yes   Is the patient/caregiver able to teach back signs and symptoms related to disease process for when to call 911? Yes   TCM call completed? Yes   Call end time 1606            Staci Ware RN    10/5/2023, 16:07 EDT

## 2023-10-05 NOTE — OUTREACH NOTE
Call Center TCM Note      Flowsheet Row Responses   [facility] [place] patient discharged from? Non-BH   Does the patient have one of the following disease processes/diagnoses(primary or secondary)? Other   TCM attempt successful? No  [verbal release for Kymberly, wife]   Unsuccessful attempts Attempt 1  [attempted pt and wife]   Call Status Left message            Staci Ware RN    10/5/2023, 09:25 EDT

## 2023-10-18 RX ORDER — ATORVASTATIN CALCIUM 80 MG/1
80 TABLET, FILM COATED ORAL NIGHTLY
Qty: 90 TABLET | Refills: 0 | Status: SHIPPED | OUTPATIENT
Start: 2023-10-18

## 2023-11-01 ENCOUNTER — OFFICE VISIT (OUTPATIENT)
Dept: FAMILY MEDICINE CLINIC | Age: 73
End: 2023-11-01
Payer: MEDICARE

## 2023-11-01 VITALS
WEIGHT: 224.6 LBS | SYSTOLIC BLOOD PRESSURE: 112 MMHG | BODY MASS INDEX: 32.16 KG/M2 | HEIGHT: 70 IN | HEART RATE: 76 BPM | TEMPERATURE: 97.4 F | DIASTOLIC BLOOD PRESSURE: 58 MMHG

## 2023-11-01 DIAGNOSIS — D49.6 CAVERNOUS SINUS TUMOR: ICD-10-CM

## 2023-11-01 DIAGNOSIS — K21.9 GERD WITHOUT ESOPHAGITIS: Primary | ICD-10-CM

## 2023-11-01 DIAGNOSIS — K92.2 LOWER GASTROINTESTINAL BLEED: ICD-10-CM

## 2023-11-01 DIAGNOSIS — E78.00 HIGH CHOLESTEROL: ICD-10-CM

## 2023-11-01 DIAGNOSIS — I10 ESSENTIAL HYPERTENSION: ICD-10-CM

## 2023-11-01 PROBLEM — Z86.010 HISTORY OF COLON POLYPS: Status: ACTIVE | Noted: 2023-11-01

## 2023-11-01 PROBLEM — D33.2 BRAIN TUMOR (BENIGN): Chronic | Status: ACTIVE | Noted: 2023-11-01

## 2023-11-01 PROBLEM — D33.2 BRAIN TUMOR (BENIGN): Chronic | Status: RESOLVED | Noted: 2023-11-01 | Resolved: 2023-11-01

## 2023-11-01 PROBLEM — Z86.0100 HISTORY OF COLON POLYPS: Status: ACTIVE | Noted: 2023-11-01

## 2023-11-01 PROCEDURE — 1159F MED LIST DOCD IN RCRD: CPT | Performed by: FAMILY MEDICINE

## 2023-11-01 PROCEDURE — 99214 OFFICE O/P EST MOD 30 MIN: CPT | Performed by: FAMILY MEDICINE

## 2023-11-01 PROCEDURE — 3078F DIAST BP <80 MM HG: CPT | Performed by: FAMILY MEDICINE

## 2023-11-01 PROCEDURE — 1160F RVW MEDS BY RX/DR IN RCRD: CPT | Performed by: FAMILY MEDICINE

## 2023-11-01 PROCEDURE — 3074F SYST BP LT 130 MM HG: CPT | Performed by: FAMILY MEDICINE

## 2023-11-01 RX ORDER — KETOCONAZOLE 20 MG/G
CREAM TOPICAL
COMMUNITY
Start: 2023-09-08

## 2023-11-01 RX ORDER — PANTOPRAZOLE SODIUM 40 MG/1
40 TABLET, DELAYED RELEASE ORAL NIGHTLY
Qty: 90 TABLET | Refills: 1 | Status: SHIPPED | OUTPATIENT
Start: 2023-11-01 | End: 2024-04-29

## 2023-11-01 RX ORDER — CLINDAMYCIN PHOSPHATE 10 UG/ML
LOTION TOPICAL
COMMUNITY
Start: 2023-09-18

## 2023-11-01 NOTE — ASSESSMENT & PLAN NOTE
THIS IS A NEW ISSUE, PROBABLE SOURCE OF THE BLEEDING.  WILL START A PPI AND ADVISE TO SEE GASTRO AS PLANNED.  PROBABLY NEEDS AN EGD OTC ANTACIDS PRN

## 2023-11-01 NOTE — PROGRESS NOTES
Chief Complaint  Diverticulitis (1 month follow up )    Subjective          Karan Graham presents to [facility]  History of Present Illness  --TOLERATING BLOOD PRESSURE MEDICATION WITHOUT APPARENT SIDE EFFECTS  --A LEFT CAVERNOUS SINUS TUMOR WAS DISCOVERED A FEW YEARS AGO BUT HAS BEEN STABLE ON SERIAL CT'S   --LAST LIPIDS WERE OK, NO ISSUES WITH MEDS  --HAS DEVELOPED GERD SYMPTOMS, DRINKS A FAIR AMOUNT OF CAFFEINE AND WAS PLACED ON AN NSAID FOR OA.  DEVELOPED DARK STOOLS AND ADMITTED.  THE BLEEDING SUBSIDED BUT COLONOSCOPY WAS NEGATIVE EXCEPT FOR SOME SMALL POLYPS.  WILL SEE GASTROENTEROLOGY SOON FOR FURTHER EVAL AND PROBABLE EGD.  OTC ANTACIDS AND PRN OTC PRILOSEC HELP        Allergies   Allergen Reactions    Codeine Diarrhea and Nausea And Vomiting    Ibuprofen Nausea And Vomiting        Health Maintenance Due   Topic Date Due    ZOSTER VACCINE (1 of 2) Never done    HEPATITIS C SCREENING  Never done    ANNUAL WELLNESS VISIT  Never done    INFLUENZA VACCINE  Never done        Current Outpatient Medications on File Prior to Visit   Medication Sig    albuterol sulfate  (90 Base) MCG/ACT inhaler INHALE 2 PUFFS EVERY 6 HOURS AS NEEDED FOR SHORTNESS OF BREATH    aspirin 81 MG chewable tablet Chew 1 tablet Daily.    atorvastatin (LIPITOR) 80 MG tablet TAKE 1 TABLET BY MOUTH EVERY NIGHT    clindamycin (CLEOCIN T) 1 % lotion APPLY A THIN LAYER TO THE AFFECTED AREA ON THE LEFT ARM TWICE DAILY DURING FLARES AND ONCE DAILY FOR MAINTENANCE    clopidogrel (PLAVIX) 75 MG tablet Take 1 tablet by mouth Daily.    guaiFENesin (MUCINEX PO) Take  by mouth As Needed.    ketoconazole (NIZORAL) 2 % cream Apply  topically to the appropriate area as directed.    Loratadine 10 MG capsule Take  by mouth.    metoprolol succinate XL (TOPROL-XL) 25 MG 24 hr tablet Take 1 tablet by mouth Daily.    nitroglycerin (NITROSTAT) 0.4 MG SL tablet Place 1 tablet under the tongue Every 5 (Five) Minutes As  "Needed for Chest Pain. Take no more than 3 doses in 15 minutes.    ranolazine (Ranexa) 500 MG 12 hr tablet Take 1 tablet by mouth 2 (Two) Times a Day.    tamsulosin (FLOMAX) 0.4 MG capsule 24 hr capsule Take 1 capsule by mouth 2 (Two) Times a Day.    triamcinolone (KENALOG) 0.1 % ointment Apply 1 application topically to the appropriate area as directed 2 (Two) Times a Day.    [DISCONTINUED] omeprazole (priLOSEC) 20 MG capsule Take 1 capsule by mouth Daily.    meloxicam (MOBIC) 7.5 MG tablet TAKE 1 TABLET BY MOUTH DAILY (Patient not taking: Reported on 11/1/2023)    [DISCONTINUED] sodium-potassium-magnesium sulfates (SUPREP) 17.5-3.13-1.6 GM/177ML solution oral solution TAKE 354 MLS BY MOUTH AS NEEDED (Patient not taking: Reported on 5/11/2023)    [DISCONTINUED] traMADol (ULTRAM) 50 MG tablet Take 1 tablet by mouth 2 (Two) Times a Day As Needed for Moderate Pain (Knee pain). (Patient not taking: Reported on 11/1/2023)     No current facility-administered medications on file prior to visit.       Immunization History   Administered Date(s) Administered    COVID-19 (PFIZER) Purple Cap Monovalent 02/24/2021, 03/17/2021, 10/06/2021    Pneumococcal Conjugate 13-Valent (PCV13) 08/08/2016    Pneumococcal Polysaccharide (PPSV23) 10/01/2018       Review of Systems   Constitutional:  Negative for activity change, appetite change, chills, fatigue and fever.   HENT:  Negative for congestion, ear pain, rhinorrhea and sore throat.    Respiratory:  Negative for cough and shortness of breath.    Cardiovascular:  Negative for chest pain, palpitations and leg swelling.   Gastrointestinal:  Negative for abdominal pain, constipation, diarrhea, nausea and vomiting.   Musculoskeletal:  Negative for arthralgias and myalgias.   Neurological:  Negative for headache.        Objective     /58 (BP Location: Right arm, Patient Position: Sitting, Cuff Size: Large Adult)   Pulse 76   Temp 97.4 °F (36.3 °C) (Oral)   Ht 177.8 cm (70\")   " Wt 102 kg (224 lb 9.6 oz)   BMI 32.23 kg/m²       Physical Exam  Vitals and nursing note reviewed.   Constitutional:       General: He is not in acute distress.     Appearance: Normal appearance.   Cardiovascular:      Rate and Rhythm: Normal rate and regular rhythm.      Heart sounds: Normal heart sounds. No murmur heard.  Pulmonary:      Effort: Pulmonary effort is normal.      Breath sounds: Normal breath sounds.   Abdominal:      Palpations: Abdomen is soft.      Tenderness: There is no abdominal tenderness.   Musculoskeletal:      Cervical back: Neck supple.      Right lower leg: No edema.      Left lower leg: No edema.   Lymphadenopathy:      Cervical: No cervical adenopathy.   Neurological:      General: No focal deficit present.      Mental Status: He is alert.      Cranial Nerves: No cranial nerve deficit.      Coordination: Coordination normal.      Gait: Gait normal.   Psychiatric:         Mood and Affect: Mood normal.         Behavior: Behavior normal.         Result Review :                             Assessment and Plan      Diagnoses and all orders for this visit:    1. GERD without esophagitis (Primary)  Assessment & Plan:  THIS IS A NEW ISSUE, PROBABLE SOURCE OF THE BLEEDING.  WILL START A PPI AND ADVISE TO SEE GASTRO AS PLANNED.  PROBABLY NEEDS AN EGD OTC ANTACIDS PRN     Orders:  -     pantoprazole (PROTONIX) 40 MG EC tablet; Take 1 tablet by mouth Every Night for 180 days.  Dispense: 90 tablet; Refill: 1    2. Lower gastrointestinal bleed  Comments:  COLONOSCOPY REPORT REVIEWED.  OBSERVE AS IMPROVED.  MAY NEED REPEAT SCOPE    3. Cavernous sinus tumor  Assessment & Plan:  CT REPORT REVIEWED, PLANS PER NEUROSURGEON       4. Essential hypertension  Assessment & Plan:  Hypertension is improving with treatment.  Continue current treatment regimen.  Blood pressure will be reassessed in 3 months.      5. High cholesterol  Assessment & Plan:  Lipid abnormalities are improving with  treatment.  Nutritional counseling was provided.  Lipids will be reassessed in 6 months.              Follow Up     Return in about 3 months (around 2/1/2024).    Patient was given instructions and counseling regarding his condition or for health maintenance advice. Please see specific information pulled into the AVS if appropriate.

## 2023-11-06 RX ORDER — CLOPIDOGREL BISULFATE 75 MG/1
75 TABLET ORAL DAILY
Qty: 30 TABLET | Refills: 0 | Status: SHIPPED | OUTPATIENT
Start: 2023-11-06 | End: 2023-11-06 | Stop reason: SDUPTHER

## 2023-11-06 RX ORDER — CLOPIDOGREL BISULFATE 75 MG/1
75 TABLET ORAL DAILY
Qty: 90 TABLET | Refills: 1 | Status: SHIPPED | OUTPATIENT
Start: 2023-11-06

## 2023-11-06 NOTE — TELEPHONE ENCOUNTER
Rx Refill Note  Requested Prescriptions     Pending Prescriptions Disp Refills    clopidogrel (PLAVIX) 75 MG tablet [Pharmacy Med Name: CLOPIDOGREL 75MG TABLETS] 90 tablet      Sig: TAKE 1 TABLET BY MOUTH DAILY      Last office visit with prescribing clinician: 11/1/2023   Last telemedicine visit with prescribing clinician: Visit date not found   Next office visit with prescribing clinician: 11/13/2023     Refill sent today for #30.  Pt asking for a 90 day supply be sent to pharmacy.     Aleja Lynn LPN  11/06/23, 11:20 EST

## 2023-11-13 ENCOUNTER — OFFICE VISIT (OUTPATIENT)
Dept: FAMILY MEDICINE CLINIC | Age: 73
End: 2023-11-13
Payer: MEDICARE

## 2023-11-13 VITALS
DIASTOLIC BLOOD PRESSURE: 75 MMHG | HEART RATE: 74 BPM | SYSTOLIC BLOOD PRESSURE: 113 MMHG | BODY MASS INDEX: 32.13 KG/M2 | WEIGHT: 224.4 LBS | HEIGHT: 70 IN

## 2023-11-13 DIAGNOSIS — E78.00 HIGH CHOLESTEROL: ICD-10-CM

## 2023-11-13 DIAGNOSIS — I10 ESSENTIAL HYPERTENSION: ICD-10-CM

## 2023-11-13 DIAGNOSIS — Z00.00 MEDICARE ANNUAL WELLNESS VISIT, SUBSEQUENT: Primary | ICD-10-CM

## 2023-11-13 DIAGNOSIS — G89.29 CHRONIC PAIN OF LEFT ANKLE: ICD-10-CM

## 2023-11-13 DIAGNOSIS — K21.9 GERD WITHOUT ESOPHAGITIS: ICD-10-CM

## 2023-11-13 DIAGNOSIS — M25.572 CHRONIC PAIN OF LEFT ANKLE: ICD-10-CM

## 2023-11-13 DIAGNOSIS — J45.20 MILD INTERMITTENT ASTHMA, UNCOMPLICATED: ICD-10-CM

## 2023-11-13 PROBLEM — Z28.20 VACCINE REFUSED BY PATIENT: Status: ACTIVE | Noted: 2023-11-13

## 2023-11-13 NOTE — ASSESSMENT & PLAN NOTE
ADVICE GIVEN RE:  SEATBELT USE, ALCOHOL USE, HEALTHY DIET, ROUTINE EYE AND DENTAL EXAM, ROUTINE VACCINATIONS.    DECLINES A FLU SHOT

## 2023-11-13 NOTE — ASSESSMENT & PLAN NOTE
GETTING WORSE, HE WILL DISCUSS WITH HIS ORTHOPEDIST AT THEIR NEXT VISIT.  MAY NEED REFERRAL TO PODIATRY

## 2023-11-13 NOTE — PROGRESS NOTES
The ABCs of the Annual Wellness Visit  Subsequent Medicare Wellness Visit    Subjective    Karan Graham is a 73 y.o. male who presents for a Subsequent Medicare Wellness Visit.    The following portions of the patient's history were reviewed and   updated as appropriate: allergies, current medications, past family history, past medical history, past social history, past surgical history, and problem list.    Compared to one year ago, the patient feels his physical   health is better.    Compared to one year ago, the patient feels his mental   health is the same.    Recent Hospitalizations:  He was not admitted to the hospital during the last year.       Current Medical Providers:  Patient Care Team:  Marshal Light MD as PCP - General (Family Medicine)    Outpatient Medications Prior to Visit   Medication Sig Dispense Refill    albuterol sulfate  (90 Base) MCG/ACT inhaler INHALE 2 PUFFS EVERY 6 HOURS AS NEEDED FOR SHORTNESS OF BREATH      aspirin 81 MG chewable tablet Chew 1 tablet Daily.      atorvastatin (LIPITOR) 80 MG tablet TAKE 1 TABLET BY MOUTH EVERY NIGHT 90 tablet 0    clindamycin (CLEOCIN T) 1 % lotion APPLY A THIN LAYER TO THE AFFECTED AREA ON THE LEFT ARM TWICE DAILY DURING FLARES AND ONCE DAILY FOR MAINTENANCE      clopidogrel (PLAVIX) 75 MG tablet TAKE 1 TABLET BY MOUTH DAILY 90 tablet 1    guaiFENesin (MUCINEX PO) Take  by mouth As Needed.      ketoconazole (NIZORAL) 2 % cream Apply  topically to the appropriate area as directed.      Loratadine 10 MG capsule Take  by mouth.      meloxicam (MOBIC) 7.5 MG tablet TAKE 1 TABLET BY MOUTH DAILY 30 tablet 0    metoprolol succinate XL (TOPROL-XL) 25 MG 24 hr tablet Take 1 tablet by mouth Daily. 90 tablet 3    nitroglycerin (NITROSTAT) 0.4 MG SL tablet Place 1 tablet under the tongue Every 5 (Five) Minutes As Needed for Chest Pain. Take no more than 3 doses in 15 minutes.      pantoprazole (PROTONIX) 40 MG EC tablet Take 1 tablet by mouth  "Every Night for 180 days. 90 tablet 1    tamsulosin (FLOMAX) 0.4 MG capsule 24 hr capsule Take 1 capsule by mouth 2 (Two) Times a Day.      triamcinolone (KENALOG) 0.1 % ointment Apply 1 application topically to the appropriate area as directed 2 (Two) Times a Day. 30 g 2    ranolazine (Ranexa) 500 MG 12 hr tablet Take 1 tablet by mouth 2 (Two) Times a Day. (Patient not taking: Reported on 11/13/2023) 180 tablet 3     No facility-administered medications prior to visit.       No opioid medication identified on active medication list. I have reviewed chart for other potential  high risk medication/s and harmful drug interactions in the elderly.        Aspirin is on active medication list. Aspirin use is indicated based on review of current medical condition/s. Pros and cons of this therapy have been discussed today. Benefits of this medication outweigh potential harm.  Patient has been encouraged to continue taking this medication.  .      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  ACP discussion was held with the patient during this visit. Patient does not have an advance directive, declines further assistance.     Objective    Vitals:    11/13/23 1005   BP: 113/75   BP Location: Left arm   Patient Position: Sitting   Pulse: 74   Weight: 102 kg (224 lb 6.4 oz)   Height: 177.8 cm (70\") " "  PainSc:   7   PainLoc: Ankle     Estimated body mass index is 32.2 kg/m² as calculated from the following:    Height as of this encounter: 177.8 cm (70\").    Weight as of this encounter: 102 kg (224 lb 6.4 oz).    BMI is >= 30 and <35. (Class 1 Obesity). The following options were offered after discussion;: nutrition counseling/recommendations      Does the patient have evidence of cognitive impairment? No          HEALTH RISK ASSESSMENT    Smoking Status:  Social History     Tobacco Use   Smoking Status Never    Passive exposure: Past   Smokeless Tobacco Never     Alcohol Consumption:  Social History     Substance and Sexual Activity   Alcohol Use Yes    Comment: Don’t drink weekly     Fall Risk Screen:    OZZIEADI Fall Risk Assessment was completed, and patient is at LOW risk for falls.Assessment completed on:2023    Depression Screenin/13/2023    10:02 AM   PHQ-2/PHQ-9 Depression Screening   Little Interest or Pleasure in Doing Things 0-->not at all   Feeling Down, Depressed or Hopeless 0-->not at all   PHQ-9: Brief Depression Severity Measure Score 0       Health Habits and Functional and Cognitive Screenin/13/2023    10:02 AM   Functional & Cognitive Status   Do you have difficulty preparing food and eating? No   Do you have difficulty bathing yourself, getting dressed or grooming yourself? No   Do you have difficulty using the toilet? No   Do you have difficulty moving around from place to place? No   Do you have trouble with steps or getting out of a bed or a chair? No   Current Diet Limited Junk Food   Dental Exam Up to date   Eye Exam Up to date   Exercise (times per week) 7 times per week   Current Exercises Include Yard Work;Walking;Other   Do you need help using the phone?  No   Are you deaf or do you have serious difficulty hearing?  No   Do you need help to go to places out of walking distance? No   Do you need help shopping? No   Do you need help preparing meals?  No   Do you " need help with housework?  No   Do you need help with laundry? No   Do you need help taking your medications? No   Do you need help managing money? No   Do you ever drive or ride in a car without wearing a seat belt? No   Have you felt unusual stress, anger or loneliness in the last month? No   Who do you live with? Spouse   If you need help, do you have trouble finding someone available to you? No   Do you have difficulty concentrating, remembering or making decisions? No       Age-appropriate Screening Schedule:  Refer to the list below for future screening recommendations based on patient's age, sex and/or medical conditions. Orders for these recommended tests are listed in the plan section. The patient has been provided with a written plan.    Health Maintenance   Topic Date Due    HEPATITIS C SCREENING  Never done    INFLUENZA VACCINE  Never done    COVID-19 Vaccine (4 - 2023-24 season) 09/01/2023    BMI FOLLOWUP  11/09/2023    ZOSTER VACCINE (1 of 2) 11/13/2023 (Originally 5/12/2002)    TDAP/TD VACCINES (1 - Tdap) 11/13/2024 (Originally 5/12/1971)    LIPID PANEL  05/08/2024    ANNUAL WELLNESS VISIT  11/13/2024    COLORECTAL CANCER SCREENING  10/02/2033    Pneumococcal Vaccine 65+  Completed                  CMS Preventative Services Quick Reference  Risk Factors Identified During Encounter  None Identified  The above risks/problems have been discussed with the patient.  Pertinent information has been shared with the patient in the After Visit Summary.  An After Visit Summary and PPPS were made available to the patient.    Follow Up:   Next Medicare Wellness visit to be scheduled in 1 year.       Additional E&M Note during same encounter follows:  Patient has multiple medical problems which are significant and separately identifiable that require additional work above and beyond the Medicare Wellness Visit.      Chief Complaint  Medicare Wellness-subsequent    Subjective        --TOLERATING BLOOD PRESSURE  "MEDICATION WITHOUT APPARENT SIDE EFFECTS  --GERD IS IMPROVED ON THE PPI AND PRN ANTACIDS, NO FURTHER SYMPTOMS OR BLEEDING  --LAST LIPIDS WERE OK ON CURRENT DOSE OF MEDS  --BREATHING IS STABLE ON CURRENT INHALED MEDS  --CHRONIC LEFT ANKLE PAIN, ALREADY SEES ORTHO FOR HIS KNEES      Karan Graham is also being seen today for GERD, BLOOD PRESSURE, CHOLESTEROL    Review of Systems   Constitutional:  Negative for activity change, appetite change, chills, fatigue and fever.   HENT:  Negative for congestion, ear pain, hearing loss, rhinorrhea and sore throat.    Eyes:  Negative for discharge and visual disturbance.   Respiratory:  Negative for cough and shortness of breath.    Cardiovascular:  Negative for chest pain, palpitations and leg swelling.   Gastrointestinal:  Negative for abdominal pain, nausea and vomiting.   Genitourinary:  Negative for dysuria and hematuria.   Musculoskeletal:  Negative for arthralgias and myalgias.   Psychiatric/Behavioral:  Negative for dysphoric mood.        Objective   Vital Signs:  /75 (BP Location: Left arm, Patient Position: Sitting)   Pulse 74   Ht 177.8 cm (70\")   Wt 102 kg (224 lb 6.4 oz)   BMI 32.20 kg/m²     Physical Exam  Vitals and nursing note reviewed.   Constitutional:       General: He is not in acute distress.     Appearance: Normal appearance.   HENT:      Right Ear: Tympanic membrane normal.      Left Ear: Tympanic membrane normal.      Mouth/Throat:      Pharynx: Oropharynx is clear.   Eyes:      Conjunctiva/sclera: Conjunctivae normal.   Cardiovascular:      Rate and Rhythm: Normal rate and regular rhythm.      Heart sounds: Normal heart sounds. No murmur heard.  Pulmonary:      Effort: Pulmonary effort is normal.      Breath sounds: Normal breath sounds.   Abdominal:      General: Bowel sounds are normal.      Palpations: Abdomen is soft.      Tenderness: There is no abdominal tenderness.   Musculoskeletal:      Cervical back: Neck supple.      Right lower " leg: No edema.      Left lower leg: No edema.   Lymphadenopathy:      Cervical: No cervical adenopathy.   Neurological:      General: No focal deficit present.      Mental Status: He is alert.      Cranial Nerves: No cranial nerve deficit.      Coordination: Coordination normal.      Gait: Gait normal.   Psychiatric:         Mood and Affect: Mood normal.         Behavior: Behavior normal.          The following data was reviewed by: Marshal Light MD on 11/13/2023:  Common labs          2/8/2023    12:20 5/8/2023    10:53   Common Labs   Glucose 92  106    BUN 12  12    Creatinine 0.90  0.84    Sodium 138  140    Potassium 4.4  4.2    Chloride 105  107    Calcium 9.2  9.1    Albumin 4.4  4.9    Total Bilirubin 1.0  0.9    Alkaline Phosphatase 90  77    AST (SGOT) 21  29    ALT (SGPT) 24  26    WBC 6.30  5.75    Hemoglobin 14.1  14.2    Hematocrit 40.2  40.7    Platelets 154  165    Total Cholesterol  92    Triglycerides  105    HDL Cholesterol  35    LDL Cholesterol   37                 Assessment and Plan   Diagnoses and all orders for this visit:    1. Medicare annual wellness visit, subsequent (Primary)  Assessment & Plan:  ADVICE GIVEN RE:  SEATBELT USE, ALCOHOL USE, HEALTHY DIET, ROUTINE EYE AND DENTAL EXAM, ROUTINE VACCINATIONS.    DECLINES A FLU SHOT       2. Essential hypertension  Assessment & Plan:  Hypertension is improving with treatment.  Continue current treatment regimen.  Blood pressure will be reassessed at the next regular appointment.      3. GERD without esophagitis  Assessment & Plan:  IMPROVED WITH CURRENT TREATMENT, CONTINUE SAME, WILL REEVALUATE AT NEXT VISIT       4. High cholesterol  Assessment & Plan:  Lipid abnormalities are improving with treatment.  Nutritional counseling was provided.  Lipids will be reassessed in 6 months.      5. Mild intermittent asthma, uncomplicated  Assessment & Plan:  Asthma is improving with treatment.  The patient is experiencing no daytime asthma  symptoms. He is experiencing no nighttime asthma symptoms.  Discussed monitoring symptoms and use of quick-relief medications and contacting us early in the course of exacerbations.          6. Chronic pain of left ankle  Assessment & Plan:  GETTING WORSE, HE WILL DISCUSS WITH HIS ORTHOPEDIST AT THEIR NEXT VISIT.  MAY NEED REFERRAL TO PODIATRY               Follow Up   Return in about 6 months (around 5/13/2024).  Patient was given instructions and counseling regarding his condition or for health maintenance advice. Please see specific information pulled into the AVS if appropriate.

## 2023-12-18 RX ORDER — ATORVASTATIN CALCIUM 80 MG/1
80 TABLET, FILM COATED ORAL NIGHTLY
Qty: 90 TABLET | Refills: 1 | Status: SHIPPED | OUTPATIENT
Start: 2023-12-18

## 2024-01-04 NOTE — TELEPHONE ENCOUNTER
Rx Refill Note  Requested Prescriptions     Pending Prescriptions Disp Refills    nitroglycerin (NITROSTAT) 0.4 MG SL tablet       Sig: Place 1 tablet under the tongue Every 5 (Five) Minutes As Needed for Chest Pain. Take no more than 3 doses in 15 minutes.      Last office visit with prescribing clinician: 11/13/2023   Last telemedicine visit with prescribing clinician: Visit date not found   Next office visit with prescribing clinician: 5/15/2024    Pt said he lost his bottle.     Aleja Lynn LPN  01/04/24, 09:57 EST

## 2024-01-08 ENCOUNTER — OFFICE VISIT (OUTPATIENT)
Dept: ORTHOPEDIC SURGERY | Facility: CLINIC | Age: 74
End: 2024-01-08
Payer: MEDICARE

## 2024-01-08 VITALS — WEIGHT: 221.4 LBS | BODY MASS INDEX: 31.7 KG/M2 | TEMPERATURE: 98 F | HEIGHT: 70 IN

## 2024-01-08 DIAGNOSIS — R52 PAIN: ICD-10-CM

## 2024-01-08 DIAGNOSIS — M76.822 POSTERIOR TIBIAL TENDINITIS OF LEFT LOWER EXTREMITY: ICD-10-CM

## 2024-01-08 DIAGNOSIS — M76.72 PERONEAL TENDINITIS OF LEFT LOWER EXTREMITY: Primary | ICD-10-CM

## 2024-01-08 DIAGNOSIS — M76.61 ACHILLES TENDONITIS, BILATERAL: ICD-10-CM

## 2024-01-08 DIAGNOSIS — M76.71 PERONEAL TENDINITIS OF RIGHT LOWER EXTREMITY: ICD-10-CM

## 2024-01-08 DIAGNOSIS — M76.62 ACHILLES TENDONITIS, BILATERAL: ICD-10-CM

## 2024-01-08 PROCEDURE — 99204 OFFICE O/P NEW MOD 45 MIN: CPT | Performed by: ORTHOPAEDIC SURGERY

## 2024-01-08 RX ORDER — DIAZEPAM 5 MG/1
TABLET ORAL
COMMUNITY
Start: 2023-11-14

## 2024-01-08 RX ORDER — OMEPRAZOLE 20 MG/1
CAPSULE, DELAYED RELEASE ORAL
COMMUNITY
Start: 2023-12-26

## 2024-01-08 RX ORDER — METHOCARBAMOL 500 MG/1
500 TABLET, FILM COATED ORAL
COMMUNITY
Start: 2023-12-08

## 2024-01-08 RX ORDER — NITROGLYCERIN 0.4 MG/1
0.4 TABLET SUBLINGUAL
Qty: 30 TABLET | Refills: 11 | Status: SHIPPED | OUTPATIENT
Start: 2024-01-08

## 2024-01-08 RX ORDER — METHYLPREDNISOLONE 4 MG/1
TABLET ORAL
COMMUNITY
Start: 2023-12-08

## 2024-01-09 ENCOUNTER — PATIENT ROUNDING (BHMG ONLY) (OUTPATIENT)
Dept: ORTHOPEDIC SURGERY | Facility: CLINIC | Age: 74
End: 2024-01-09
Payer: MEDICARE

## 2024-01-09 NOTE — PROGRESS NOTES
A MWI Message has been sent to the patient for PATIENT ROUNDING with [facility] [place]

## 2024-01-09 NOTE — PROGRESS NOTES
General Exam    Patient: Karan Graham    YOB: 1950    Medical Record Number: [id number]    Chief Complaints: Bilateral foot and ankle pain    History of Present Illness:     73 y.o. male patient who presents for evaluation of bilateral foot ankle pain.  States he for started on the right but his left is more problematic.  Locates pain behind the lower aspect of his ankle as well as directly posterior along his heel.  States symptoms been going on for some time he is flat-footed.  States the left side has been pretty painful and very limiting at times.    Denies any numbness or tingling.  Denies any fevers, cough or shortness of breath.    Allergies:   Allergies   Allergen Reactions    Codeine Diarrhea and Nausea And Vomiting    Ibuprofen Nausea And Vomiting       Home Medications:      Current Outpatient Medications:     albuterol sulfate  (90 Base) MCG/ACT inhaler, INHALE 2 PUFFS EVERY 6 HOURS AS NEEDED FOR SHORTNESS OF BREATH, Disp: , Rfl:     aspirin 81 MG chewable tablet, Chew 1 tablet Daily., Disp: , Rfl:     atorvastatin (LIPITOR) 80 MG tablet, TAKE 1 TABLET BY MOUTH EVERY NIGHT, Disp: 90 tablet, Rfl: 1    clindamycin (CLEOCIN T) 1 % lotion, APPLY A THIN LAYER TO THE AFFECTED AREA ON THE LEFT ARM TWICE DAILY DURING FLARES AND ONCE DAILY FOR MAINTENANCE, Disp: , Rfl:     clopidogrel (PLAVIX) 75 MG tablet, TAKE 1 TABLET BY MOUTH DAILY, Disp: 90 tablet, Rfl: 1    diazePAM (VALIUM) 5 MG tablet, TAKE 1 TABLET 30 MINUTES PRIOR TO PROCEDURE AND ONE TABLET AT TIME OF PROCEDURE, Disp: , Rfl:     guaiFENesin (MUCINEX PO), Take  by mouth As Needed., Disp: , Rfl:     ketoconazole (NIZORAL) 2 % cream, Apply  topically to the appropriate area as directed., Disp: , Rfl:     Loratadine 10 MG capsule, Take  by mouth., Disp: , Rfl:     methocarbamol (ROBAXIN) 500 MG tablet, Take 1 tablet by mouth., Disp: , Rfl:     methylPREDNISolone (MEDROL) 4 MG dose pack, follow package directions, Disp: , Rfl:      metoprolol succinate XL (TOPROL-XL) 25 MG 24 hr tablet, Take 1 tablet by mouth Daily., Disp: 90 tablet, Rfl: 3    nitroglycerin (NITROSTAT) 0.4 MG SL tablet, Place 1 tablet under the tongue Every 5 (Five) Minutes As Needed for Chest Pain. Take no more than 3 doses in 15 minutes., Disp: 30 tablet, Rfl: 11    omeprazole (priLOSEC) 20 MG capsule, TAKE 1 CAPSULE(20 MG) BY MOUTH EVERY DAY, Disp: , Rfl:     pantoprazole (PROTONIX) 40 MG EC tablet, Take 1 tablet by mouth Every Night for 180 days., Disp: 90 tablet, Rfl: 1    tamsulosin (FLOMAX) 0.4 MG capsule 24 hr capsule, Take 1 capsule by mouth 2 (Two) Times a Day., Disp: , Rfl:     triamcinolone (KENALOG) 0.1 % ointment, Apply 1 application topically to the appropriate area as directed 2 (Two) Times a Day., Disp: 30 g, Rfl: 2    meloxicam (MOBIC) 7.5 MG tablet, TAKE 1 TABLET BY MOUTH DAILY, Disp: 30 tablet, Rfl: 0    ranolazine (Ranexa) 500 MG 12 hr tablet, Take 1 tablet by mouth 2 (Two) Times a Day. (Patient not taking: Reported on 11/13/2023), Disp: 180 tablet, Rfl: 3    Past Medical History:   Diagnosis Date    Asthma     CAD (coronary artery disease)     hx stents june 2020    Callus     Clotting disorder     Blood thinners    GERD (gastroesophageal reflux disease) Many years ago    Hypertension     Other allergic rhinitis 11/03/2022    Pancreas (digestive gland) works poorly     Skin cancer     Sleep apnea        Past Surgical History:   Procedure Laterality Date    BACK SURGERY      CARDIAC CATHETERIZATION  06/2020    CARDIAC CATHETERIZATION N/A 02/21/2023    Procedure: Left Heart Cath;  Surgeon: Eduard Pedroza MD;  Location:  [facility];  Service: Cardiovascular;  Laterality: N/A;    CARDIAC CATHETERIZATION N/A 02/21/2023    Procedure: Coronary angiography;  Surgeon: Eduard Pedroza MD;  Location:  [facility];  Service: Cardiovascular;  Laterality: N/A;    CHOLECYSTECTOMY      COLONOSCOPY  2020    NORMAL    COLONOSCOPY   2023    POLYPS    CORONARY ANGIOPLASTY WITH STENT PLACEMENT      TWICE    FRACTURE SURGERY  Various    HEMORRHOIDECTOMY      PANCREAS SURGERY      STENTING    SHOULDER SURGERY      SINUS SURGERY  2022    Deviated septum    TOE SURGERY Right     big toe    TONSILLECTOMY      TUMOR REMOVAL      Roof Of Mouth       Social History     Occupational History    Not on file   Tobacco Use    Smoking status: Never     Passive exposure: Past    Smokeless tobacco: Never   Vaping Use    Vaping Use: Never used   Substance and Sexual Activity    Alcohol use: Yes     Comment: Don’t drink weekly    Drug use: No    Sexual activity: Yes     Partners: Female     Birth control/protection: None      Social History     Social History Narrative    Not on file       Family History   Problem Relation Age of Onset    Hypertension Mother     Leukemia Mother     No Known Problems Father     Hypertension Sister     Heart disease Brother     Cancer Brother         Colon cancer    Cancer Brother         Prostate    Cancer Brother         Lung cancer    Cancer Brother         Lung cancer    No Known Problems Maternal Aunt     No Known Problems Maternal Uncle     No Known Problems Paternal Aunt     No Known Problems Paternal Uncle     No Known Problems Maternal Grandmother     No Known Problems Maternal Grandfather     No Known Problems Paternal Grandmother     No Known Problems Paternal Grandfather     Cancer Other     Anesthesia problems Neg Hx     Broken bones Neg Hx     Clotting disorder Neg Hx     Collagen disease Neg Hx     Diabetes Neg Hx     Dislocations Neg Hx     Osteoporosis Neg Hx     Rheumatologic disease Neg Hx     Scoliosis Neg Hx     Severe sprains Neg Hx        Review of Systems:      Constitutional: Denies fever, shaking or chills         All other pertinent positives and negatives as noted above in HPI.    Physical Exam: 73 y.o. male    Vitals:    01/08/24 1435   Temp: 98 °F (36.7 °C)   TempSrc: Temporal   Weight: 100 kg (221 lb  "6.4 oz)   Height: 177.8 cm (70\")       General:  Patient is awake and alert.  Appears in no acute distress or discomfort.      Musculoskeletal/Extremities:    Bilateral lower extremities examined, on standing there is slight valgus alignment of the hindfoot.  On the left there is tenderness on the peroneal tendons and Achilles.  There are some tenderness medially along the posterior tibial tendon on the left.  He has bilateral tenderness on the Achilles tendons.  Range of motion appears equal bilaterally.  Strength appears intact.           Radiology:       2 views of each foot and 3 views of each ankle were taken reviewed to evaluate the patient's complaint/s.    Imaging loss of the arch bilaterally.  There is some mild degenerative changes of the midfoot noted.  No acute fractures appreciated.     No imaging for comparison.    Assessment: Bilateral peroneal tendinitis, bilateral Achilles tendinitis, left posterior tibial tendinitis, pes planovalgus      Plan:      Discussed the findings with the patient a lot of this does seem to be soft tissue and I do worry about how symptomatic he is on the left in regards to possible peroneal tendon tear.  For this we will order MRI for further evaluation.  In regards to his Achilles recommend some here heel cord stretching.  With his flatfeet I mentioned orthotic that he may try.  Will hold off any braces at this time as I like him to get that MRI and follow-up with our foot and ankle specialist in the next 3 to 4 weeks for further evaluation and care.  He was understanding agreeable this plan.           We will plan for follow up as above.    All questions were answered.  Patient understands and agrees with the plan.    Mike An MD    01/08/2024    CC to Marshal Light MD        "

## 2024-01-17 ENCOUNTER — TELEPHONE (OUTPATIENT)
Dept: FAMILY MEDICINE CLINIC | Age: 74
End: 2024-01-17
Payer: MEDICARE

## 2024-01-18 ENCOUNTER — HOSPITAL ENCOUNTER (OUTPATIENT)
Dept: MRI IMAGING | Facility: HOSPITAL | Age: 74
Discharge: HOME | End: 2024-01-18
Admitting: ORTHOPAEDIC SURGERY
Payer: MEDICARE

## 2024-01-18 DIAGNOSIS — M76.72 PERONEAL TENDINITIS OF LEFT LOWER EXTREMITY: ICD-10-CM

## 2024-01-18 PROCEDURE — 73721 MRI JNT OF LWR EXTRE W/O DYE: CPT

## 2024-01-18 NOTE — TELEPHONE ENCOUNTER
I do not generally give steroid shots in this situation as they only offer short-term relief. We can send him to an orthopedist if he would like.

## 2024-03-04 ENCOUNTER — OFFICE VISIT (OUTPATIENT)
Dept: ORTHOPEDIC SURGERY | Facility: CLINIC | Age: 74
End: 2024-03-04
Payer: MEDICARE

## 2024-03-04 ENCOUNTER — OFFICE VISIT (OUTPATIENT)
Age: 74
End: 2024-03-04
Payer: MEDICARE

## 2024-03-04 VITALS — TEMPERATURE: 96.9 F | HEIGHT: 70 IN | WEIGHT: 225.8 LBS | BODY MASS INDEX: 32.33 KG/M2

## 2024-03-04 VITALS
BODY MASS INDEX: 32.24 KG/M2 | HEART RATE: 67 BPM | SYSTOLIC BLOOD PRESSURE: 124 MMHG | WEIGHT: 225.2 LBS | HEIGHT: 70 IN | DIASTOLIC BLOOD PRESSURE: 70 MMHG

## 2024-03-04 DIAGNOSIS — M76.71 PERONEAL TENDONITIS, RIGHT: ICD-10-CM

## 2024-03-04 DIAGNOSIS — M25.879 ANKLE IMPINGEMENT SYNDROME, UNSPECIFIED LATERALITY: ICD-10-CM

## 2024-03-04 DIAGNOSIS — I10 ESSENTIAL HYPERTENSION: ICD-10-CM

## 2024-03-04 DIAGNOSIS — M19.079 ARTHRITIS OF ANKLE: ICD-10-CM

## 2024-03-04 DIAGNOSIS — M76.829 POSTERIOR TIBIAL TENDON DYSFUNCTION: ICD-10-CM

## 2024-03-04 DIAGNOSIS — M19.079 ARTHRITIS OF FOOT: ICD-10-CM

## 2024-03-04 DIAGNOSIS — M19.071 ARTHRITIS OF RIGHT SUBTALAR JOINT: ICD-10-CM

## 2024-03-04 DIAGNOSIS — E78.2 MIXED HYPERLIPIDEMIA: ICD-10-CM

## 2024-03-04 DIAGNOSIS — R93.7 BONE MARROW EDEMA: ICD-10-CM

## 2024-03-04 DIAGNOSIS — I25.118 CORONARY ARTERY DISEASE OF NATIVE ARTERY OF NATIVE HEART WITH STABLE ANGINA PECTORIS: Primary | ICD-10-CM

## 2024-03-04 DIAGNOSIS — M19.072 ARTHRITIS OF LEFT SUBTALAR JOINT: ICD-10-CM

## 2024-03-04 DIAGNOSIS — M25.579 SINUS TARSITIS, UNSPECIFIED LATERALITY: ICD-10-CM

## 2024-03-04 DIAGNOSIS — M89.8X1 PAIN OF LEFT CLAVICLE: Primary | ICD-10-CM

## 2024-03-04 DIAGNOSIS — R06.09 DYSPNEA ON EXERTION: ICD-10-CM

## 2024-03-04 PROCEDURE — 3074F SYST BP LT 130 MM HG: CPT | Performed by: INTERNAL MEDICINE

## 2024-03-04 PROCEDURE — 99214 OFFICE O/P EST MOD 30 MIN: CPT | Performed by: INTERNAL MEDICINE

## 2024-03-04 PROCEDURE — 3078F DIAST BP <80 MM HG: CPT | Performed by: INTERNAL MEDICINE

## 2024-03-04 PROCEDURE — 1160F RVW MEDS BY RX/DR IN RCRD: CPT | Performed by: INTERNAL MEDICINE

## 2024-03-04 PROCEDURE — 99214 OFFICE O/P EST MOD 30 MIN: CPT | Performed by: ORTHOPAEDIC SURGERY

## 2024-03-04 PROCEDURE — 93000 ELECTROCARDIOGRAM COMPLETE: CPT | Performed by: INTERNAL MEDICINE

## 2024-03-04 PROCEDURE — 1159F MED LIST DOCD IN RCRD: CPT | Performed by: INTERNAL MEDICINE

## 2024-03-04 NOTE — PROGRESS NOTES
[facility] Follow Up Office Note     Encounter Date:24  Patient:Karan Graham  :1950  MRN:[id number]      Chief Complaint:   Chief Complaint   Patient presents with    Coronary Artery Disease     6 month f/u     History of Presenting Illness:      Mr. Graham is a 73 y.o. gentleman with past medical history notable for coronary artery disease status post percutaneous intervention, mixed hyperlipidemia, obstructive sleep apnea not on CPAP, gastric reflux disease, easy bruising, and cavernous sinus tumor who presents to our office for scheduled follow-up.  Overall patient is doing about the same he still continues to have shortness of breath is noticed a little bit more leg swelling as of late usually worse in the evening        Review of Systems:  Review of Systems   Constitutional: Positive for malaise/fatigue.   HENT: Negative.     Eyes: Negative.    Cardiovascular:  Positive for dyspnea on exertion.   Respiratory:  Positive for shortness of breath.    Endocrine: Negative.    Hematologic/Lymphatic: Bruises/bleeds easily.   Skin: Negative.    Musculoskeletal: Negative.    Gastrointestinal: Negative.    Genitourinary: Negative.    Neurological: Negative.    Psychiatric/Behavioral: Negative.     Allergic/Immunologic: Negative.        Current Outpatient Medications on File Prior to Visit   Medication Sig Dispense Refill    albuterol sulfate  (90 Base) MCG/ACT inhaler INHALE 2 PUFFS EVERY 6 HOURS AS NEEDED FOR SHORTNESS OF BREATH      aspirin 81 MG chewable tablet Chew 1 tablet Daily.      atorvastatin (LIPITOR) 80 MG tablet TAKE 1 TABLET BY MOUTH EVERY NIGHT 90 tablet 1    clindamycin (CLEOCIN T) 1 % lotion APPLY A THIN LAYER TO THE AFFECTED AREA ON THE LEFT ARM TWICE DAILY DURING FLARES AND ONCE DAILY FOR MAINTENANCE      clopidogrel (PLAVIX) 75 MG tablet TAKE 1 TABLET BY MOUTH DAILY 90 tablet 1    guaiFENesin (MUCINEX PO) Take  by mouth As Needed.      ketoconazole (NIZORAL) 2 % cream  Apply  topically to the appropriate area as directed.      Loratadine 10 MG capsule Take  by mouth.      methocarbamol (ROBAXIN) 500 MG tablet Take 1 tablet by mouth As Needed.      metoprolol succinate XL (TOPROL-XL) 25 MG 24 hr tablet Take 1 tablet by mouth Daily. 90 tablet 3    nitroglycerin (NITROSTAT) 0.4 MG SL tablet Place 1 tablet under the tongue Every 5 (Five) Minutes As Needed for Chest Pain. Take no more than 3 doses in 15 minutes. 30 tablet 11    pantoprazole (PROTONIX) 40 MG EC tablet Take 1 tablet by mouth Every Night for 180 days. 90 tablet 1    ranolazine (Ranexa) 500 MG 12 hr tablet Take 1 tablet by mouth 2 (Two) Times a Day. 180 tablet 3    tamsulosin (FLOMAX) 0.4 MG capsule 24 hr capsule Take 1 capsule by mouth 2 (Two) Times a Day.      triamcinolone (KENALOG) 0.1 % ointment Apply 1 application topically to the appropriate area as directed 2 (Two) Times a Day. 30 g 2    diazePAM (VALIUM) 5 MG tablet TAKE 1 TABLET 30 MINUTES PRIOR TO PROCEDURE AND ONE TABLET AT TIME OF PROCEDURE (Patient not taking: Reported on 3/4/2024)      [DISCONTINUED] meloxicam (MOBIC) 7.5 MG tablet TAKE 1 TABLET BY MOUTH DAILY 30 tablet 0    [DISCONTINUED] methylPREDNISolone (MEDROL) 4 MG dose pack follow package directions      [DISCONTINUED] omeprazole (priLOSEC) 20 MG capsule TAKE 1 CAPSULE(20 MG) BY MOUTH EVERY DAY       No current facility-administered medications on file prior to visit.       Allergies   Allergen Reactions    Codeine Diarrhea and Nausea And Vomiting    Ibuprofen Nausea And Vomiting       Past Medical History:   Diagnosis Date    Asthma     CAD (coronary artery disease)     hx stents june 2020    Callus     Clotting disorder     Blood thinners    GERD (gastroesophageal reflux disease) Many years ago    Hypertension     Other allergic rhinitis 11/03/2022    Pancreas (digestive gland) works poorly     Skin cancer     Sleep apnea        Past Surgical History:   Procedure Laterality Date    BACK SURGERY       CARDIAC CATHETERIZATION  06/2020    CARDIAC CATHETERIZATION N/A 02/21/2023    Procedure: Left Heart Cath;  Surgeon: Eduard Pedroza MD;  Location:  [facility];  Service: Cardiovascular;  Laterality: N/A;    CARDIAC CATHETERIZATION N/A 02/21/2023    Procedure: Coronary angiography;  Surgeon: Eduard Pedroza MD;  Location:  [facility];  Service: Cardiovascular;  Laterality: N/A;    CHOLECYSTECTOMY      COLONOSCOPY  2020    NORMAL    COLONOSCOPY  2023    POLYPS    CORONARY ANGIOPLASTY WITH STENT PLACEMENT      TWICE    FRACTURE SURGERY  Various    HEMORRHOIDECTOMY      PANCREAS SURGERY      STENTING    SHOULDER SURGERY      SINUS SURGERY  2022    Deviated septum    TOE SURGERY Right     big toe    TONSILLECTOMY      TUMOR REMOVAL      Roof Of Mouth       Social History     Socioeconomic History    Marital status:    Tobacco Use    Smoking status: Never     Passive exposure: Past    Smokeless tobacco: Never   Vaping Use    Vaping status: Never Used   Substance and Sexual Activity    Alcohol use: Yes     Comment: Don’t drink weekly    Drug use: No    Sexual activity: Yes     Partners: Female     Birth control/protection: None       Family History   Problem Relation Age of Onset    Hypertension Mother     Leukemia Mother     No Known Problems Father     Hypertension Sister     Heart disease Brother     Cancer Brother         Colon cancer    Cancer Brother         Prostate    Cancer Brother         Lung cancer    Cancer Brother         Lung cancer    No Known Problems Maternal Aunt     No Known Problems Maternal Uncle     No Known Problems Paternal Aunt     No Known Problems Paternal Uncle     No Known Problems Maternal Grandmother     No Known Problems Maternal Grandfather     No Known Problems Paternal Grandmother     No Known Problems Paternal Grandfather     Cancer Other     Anesthesia problems Neg Hx     Broken bones Neg Hx     Clotting disorder Neg Hx     Collagen  "disease Neg Hx     Diabetes Neg Hx     Dislocations Neg Hx     Osteoporosis Neg Hx     Rheumatologic disease Neg Hx     Scoliosis Neg Hx     Severe sprains Neg Hx        The following portions of the patient's history were reviewed and updated as appropriate: allergies, current medications, past family history, past medical history, past social history, past surgical history and problem list.       Objective:       Vitals:    03/04/24 1023   BP: 124/70   BP Location: Left arm   Patient Position: Sitting   Pulse: 67   Weight: 102 kg (225 lb 3.2 oz)   Height: 177.8 cm (70\")       Body mass index is 32.31 kg/m².    Physical Exam:  Constitutional: Well appearing, Well-developed, No acute distress   HENT: Oropharynx clear and membrane moist  Eyes: Normal conjunctiva, no sclera icterus.  Neck: Supple, no carotid bruit bilaterally.  Cardiovascular: Regular rate and rhythm, No Murmur, Trace bilateral lower extremity edema.  Pulmonary: Normal respiratory effort, normal lung sounds, no wheezing.  Neurological: Alert and orient x 3.   Skin: Warm, dry, no ecchymosis, no rash.  Psych: Appropriate mood and affect. Normal judgment and insight.       Lab Results   Component Value Date     05/08/2023     02/08/2023    K 4.2 05/08/2023    K 4.4 02/08/2023     05/08/2023     02/08/2023    CO2 24.0 05/08/2023    CO2 28.0 02/08/2023    BUN 12 05/08/2023    BUN 12 02/08/2023    CREATININE 0.84 05/08/2023    CREATININE 0.90 02/08/2023    EGFRIFNONA 99 09/09/2020    EGFRIFNONA 95 09/08/2020    EGFRIFAFRI >60 04/04/2022    GLUCOSE 106 (H) 05/08/2023    GLUCOSE 92 02/08/2023    CALCIUM 9.1 05/08/2023    CALCIUM 9.2 02/08/2023    ALBUMIN 4.9 05/08/2023    ALBUMIN 4.4 02/08/2023    BILITOT 0.9 05/08/2023    BILITOT 1.0 02/08/2023    AST 29 05/08/2023    AST 21 02/08/2023    ALT 26 05/08/2023    ALT 24 02/08/2023     Lab Results   Component Value Date    WBC 5.75 05/08/2023    WBC 6.30 02/08/2023    HGB 14.2 05/08/2023 "    HGB 14.1 02/08/2023    HCT 40.7 05/08/2023    HCT 40.2 02/08/2023    MCV 89.6 05/08/2023    MCV 87.6 02/08/2023     05/08/2023     02/08/2023     Lab Results   Component Value Date    CHOL 92 05/08/2023    CHOL 95 08/12/2022    TRIG 105 05/08/2023    TRIG 111 08/12/2022    HDL 35 (L) 05/08/2023    HDL 31 (L) 08/12/2022    LDL 37 05/08/2023    LDL 43 08/12/2022     Lab Results   Component Value Date    PROBNP 28.7 02/08/2023    PROBNP 34.7 03/23/2017    BNP <11.1 08/09/2019     Lab Results   Component Value Date    CKTOTAL 53 03/22/2017    CKMB 1.32 03/22/2017    TROPONINI <0.012 08/09/2019    TROPONINT <0.010 03/23/2017     Lab Results   Component Value Date    TSH 0.853 05/08/2023    TSH 1.190 08/12/2022         ECG 12 Lead    Date/Time: 3/4/2024 10:47 AM  Performed by: Eduard Pedroza MD    Authorized by: Eduard Pedroza MD  Comparison: compared with previous ECG from 8/10/2023  Similar to previous ECG  Rhythm: sinus rhythm  Conduction: right bundle branch block and left anterior fascicular block        Cardiac catheterization 2/23/2023:  No significant coronary artery disease with patent stents in the proximal to mid LAD as well as distal RCA and the PDA with no significant in-stent restenosis.  Circumflex contains luminal irregularities.  There is some small vessel disease within the very distal circumflex but small in caliber size and distribution.  Normal LVEDP of 6 mmHg    Cardiac stress test 9/22/2022:  Myocardial perfusion imaging indicates a normal myocardial perfusion study with no evidence of ischemia.  Left ventricular ejection fraction is hyperdynamic (Calculated EF > 70%). .  Impressions are consistent with a low risk study.    Echocardiogram 9/22/2022 images reviewed by myself:  Calculated left ventricular EF = 68.4%. Left ventricular systolic function is normal.All left ventricular wall segments contract normally.Normal Global longitudinal LV strain (GLS) = -23.2%.  Left  ventricular diastolic function was normal.    Stent card 8/13/2020:  Xience RALEIGH 2.5 x 23 mm placed to distal RCA    Stent card 6/26/2020:  Promus RALEIGH placed LAD Dr. Cox at [facility]    Echocardiogram 6/22/2018 [facility]:  Overall left ventricular function is normal. The estimated ejection fraction is 60-65%.   Trace-to-mild mitral regurgitation is present.   Patent foramen ovale.     Carotid duplex 6/21/2018 [facility]:  Bilateral internal carotid arteries with plaque but <50% stenosis.  Bilateral vertebral arteries with antegrade flow.  No prior examination for comparison.           Assessment:          Diagnosis Plan   1. Coronary artery disease of native artery of native heart with stable angina pectoris  ECG 12 Lead    Adult Transthoracic Echo Complete W/ Cont if Necessary Per Protocol      2. Dyspnea on exertion  Adult Transthoracic Echo Complete W/ Cont if Necessary Per Protocol      3. Essential hypertension        4. Mixed hyperlipidemia                 Plan:       Mr. Graham is a 73 y.o. gentleman with past medical history notable for coronary artery disease status post percutaneous intervention, mixed hyperlipidemia, obstructive sleep apnea not on CPAP, gastric reflux disease, easy bruising, and cavernous sinus tumor who presents to our office for scheduled follow-up.  Overall patient is doing reasonably well he still has shortness of breath he is noticing a little bit more leg swelling it has been almost a year and 1/2 to 2 years since we have looked at his heart with an echocardiogram we can make sure were not missing any new congestive heart failure issues or valvular issues that would necessitate adjustments of the his medications or consider starting diuretics.  Will follow-up on echocardiogram results to decide if any changes are needed    Leg swelling/dyspnea on exertion:  Follow-up echocardiogram    Coronary artery disease with stable angina:  Stress test 9/2022 demonstrates no  evidence of ischemia and follow up heart catheterization with no epicardial disease with patent stent.  Had headache with Imdur  Will avoid amlodipine given leg swelling  Will try Ranexa   Continue dual antiplatelet therapy  Continue statin  Continue low-dose beta-blocker    Essential hypertension:  Blood pressure well controlled no changes needed at this time    Mixed hyperlipidemia:  Continue statin therapy  LDL well controlled 5/2023  CMP with normal ALT and AST 5/2023      Follow-up:  6 months      Thank you for allowing me to participate in the care of Karan Graham. Feel free to contact me directly with any further questions or concerns.    Eduard Pedroza MD  [facility]  03/04/24  11:15 EST

## 2024-03-04 NOTE — PROGRESS NOTES
New Patient Complaint      Patient: Karan Graham  YOB: 1950 73 y.o. male  Medical Record Number: [id number]    Chief Complaints: ankles hurt    History of Present Illness: Patient reports onset about 6 months ago of intermittent moderate to severe pain in the inferolateral aspect of the left hand the more so than inframedially.  He does not have any specific acute injury or injury around the time of onset of symptoms but that he has injured it in the past back in 2003.  Pain is mainly when he twists getting in and out of his truck but also with walking.  He saw Dr. An on 1/8/2024 which time symptoms were greater on the left than the right and was sent for MRI.  He reports persistent symptoms as well as worsening symptoms on the right inferolateral more so the medial hindfoot aching and burning in nature.  Pain is rated 5 out of 10 he has had multiple cardiac stents and is on Plavix.    HPI    Allergies:   Allergies   Allergen Reactions    Codeine Diarrhea and Nausea And Vomiting    Ibuprofen Nausea And Vomiting       Medications:   Current Outpatient Medications on File Prior to Visit   Medication Sig    albuterol sulfate  (90 Base) MCG/ACT inhaler INHALE 2 PUFFS EVERY 6 HOURS AS NEEDED FOR SHORTNESS OF BREATH    aspirin 81 MG chewable tablet Chew 1 tablet Daily.    atorvastatin (LIPITOR) 80 MG tablet TAKE 1 TABLET BY MOUTH EVERY NIGHT    clindamycin (CLEOCIN T) 1 % lotion APPLY A THIN LAYER TO THE AFFECTED AREA ON THE LEFT ARM TWICE DAILY DURING FLARES AND ONCE DAILY FOR MAINTENANCE    clopidogrel (PLAVIX) 75 MG tablet TAKE 1 TABLET BY MOUTH DAILY    diazePAM (VALIUM) 5 MG tablet     guaiFENesin (MUCINEX PO) Take  by mouth As Needed.    ketoconazole (NIZORAL) 2 % cream Apply  topically to the appropriate area as directed.    Loratadine 10 MG capsule Take  by mouth.    methocarbamol (ROBAXIN) 500 MG tablet Take 1 tablet by mouth As Needed.    metoprolol succinate XL (TOPROL-XL) 25  MG 24 hr tablet Take 1 tablet by mouth Daily.    nitroglycerin (NITROSTAT) 0.4 MG SL tablet Place 1 tablet under the tongue Every 5 (Five) Minutes As Needed for Chest Pain. Take no more than 3 doses in 15 minutes.    pantoprazole (PROTONIX) 40 MG EC tablet Take 1 tablet by mouth Every Night for 180 days.    ranolazine (Ranexa) 500 MG 12 hr tablet Take 1 tablet by mouth 2 (Two) Times a Day.    tamsulosin (FLOMAX) 0.4 MG capsule 24 hr capsule Take 1 capsule by mouth 2 (Two) Times a Day.    triamcinolone (KENALOG) 0.1 % ointment Apply 1 application topically to the appropriate area as directed 2 (Two) Times a Day.    [DISCONTINUED] meloxicam (MOBIC) 7.5 MG tablet TAKE 1 TABLET BY MOUTH DAILY    [DISCONTINUED] methylPREDNISolone (MEDROL) 4 MG dose pack follow package directions    [DISCONTINUED] omeprazole (priLOSEC) 20 MG capsule TAKE 1 CAPSULE(20 MG) BY MOUTH EVERY DAY     No current facility-administered medications on file prior to visit.       Past Medical History:   Diagnosis Date    Arthritis of back     Years ago    Asthma     CAD (coronary artery disease)     hx stents june 2020    Callus     Clotting disorder     Blood thinners    GERD (gastroesophageal reflux disease) Many years ago    Hypertension     Knee swelling Years ago    Low back strain     Lumbosacral disc disease     Other allergic rhinitis 11/03/2022    Pancreas (digestive gland) works poorly     Rotator cuff syndrome     Skin cancer     Sleep apnea      Past Surgical History:   Procedure Laterality Date    BACK SURGERY      CARDIAC CATHETERIZATION  06/2020    CARDIAC CATHETERIZATION N/A 02/21/2023    Procedure: Left Heart Cath;  Surgeon: Eduard Pedroza MD;  Location:  [facility];  Service: Cardiovascular;  Laterality: N/A;    CARDIAC CATHETERIZATION N/A 02/21/2023    Procedure: Coronary angiography;  Surgeon: Eduard Pedroza MD;  Location:  [facility];  Service: Cardiovascular;  Laterality: N/A;     CHOLECYSTECTOMY      COLONOSCOPY  2020    NORMAL    COLONOSCOPY  2023    POLYPS    CORONARY ANGIOPLASTY WITH STENT PLACEMENT      TWICE    FRACTURE SURGERY  Various    HEMORRHOIDECTOMY      PANCREAS SURGERY      STENTING    SHOULDER SURGERY      SINUS SURGERY  2022    Deviated septum    TOE SURGERY Right     big toe    TONSILLECTOMY      TUMOR REMOVAL      Roof Of Mouth     Social History     Occupational History    Not on file   Tobacco Use    Smoking status: Never     Passive exposure: Past    Smokeless tobacco: Never   Vaping Use    Vaping status: Never Used   Substance and Sexual Activity    Alcohol use: Yes     Comment: Don’t drink weekly    Drug use: No    Sexual activity: Yes     Partners: Female     Birth control/protection: None      Social History     Social History Narrative    Not on file     Family History   Problem Relation Age of Onset    Hypertension Mother     Leukemia Mother     No Known Problems Father     Hypertension Sister     Heart disease Brother     Cancer Brother         Colon cancer    Cancer Brother         Prostate    Cancer Brother         Lung cancer    Cancer Brother         Lung cancer    No Known Problems Maternal Aunt     No Known Problems Maternal Uncle     No Known Problems Paternal Aunt     No Known Problems Paternal Uncle     No Known Problems Maternal Grandmother     No Known Problems Maternal Grandfather     No Known Problems Paternal Grandmother     No Known Problems Paternal Grandfather     Cancer Other     Anesthesia problems Neg Hx     Broken bones Neg Hx     Clotting disorder Neg Hx     Collagen disease Neg Hx     Diabetes Neg Hx     Dislocations Neg Hx     Osteoporosis Neg Hx     Rheumatologic disease Neg Hx     Scoliosis Neg Hx     Severe sprains Neg Hx        Review of Systems: 14 point review of systems performed, positive pertinent findings identified in HPI. All remaining systems negative     Review of Systems      Physical Exam:   Vitals:    03/04/24 1426   Temp:  "96.9 °F (36.1 °C)   Weight: 102 kg (225 lb 12.8 oz)   Height: 177.8 cm (70\")   PainSc:   5     Physical Exam   Constitutional: pleasant, well developed   Eyes: sclera non icteric  Hearing : adequate for exam  Cardiovascular: palpable pulses in left foot, left calf/ thigh NT without sign of DVT  Respiratoy: breathing unlabored   Neurological: grossly sensate to LT throughout left LE  Psychiatric: oriented with normal mood and affect.   Lymphatic: No palpable popliteal lymphadenopathy left LE  Skin: intact throughout left leg/foot  Musculoskeletal: Exam he has moderate tenderness along the inferolateral hindfoot along the peroneal tendons and some of the sinus tarsi as well as calcaneocuboid joint but none of the talonavicular joint and mild inferomedial pain.    On the right he has pain on the peroneal tendons worse with it is that inversion some pain in the sinus tarsi and inferomedial hindfoot along the posterior tib tendon.  Physical Exam  Ortho Exam    Radiology: 3 views of both ankles including lateral view of the foot and 2 views of each foot  reviewed from 1/8/2024.  Left foot and ankle do show some mild arthritis around the tibiotalar joint.  There is loss of joint space with narrowing of the subtalar joint.  There are some mild arthritis of the tibiotalar joint and some slight irregularity of the dorsal calcaneocuboid joint.  There is some mild midfoot arthritis but nothing dramatic.    Right foot and ankle are similar in appearance with decreased joint space consistent with deformity of the subtalar joint and some arthritic change of the talonavicular and calcaneocuboid joints less so to the tibiotalar joint.  Both sides do show plantar calcaneal spurring MRI films and report of the left hindfoot dated 1/18/2024 reviewed which showed probable remote nonunited fracture of the anterior process calcaneus.  There was moderate subtalar joint narrowing and moderate calcaneocuboid joint narrowing and mild to " moderate talonavicular joint narrowing.  There is mild to moderate tarsometatarsal joint space narrowing most pronounced at the first and second TMT joint and mild tibiotalar joint space narrowing.    There is subchondral edema in the lateral subtalar articulation but no acute fracture.  There is moderate hindfoot valgus alignment    There was thickening of the medial band of the plantar fascia.  Flexor and extensor tendons appeared normal as did the peroneal tendons and medial and lateral ligamentous complex.  There was lack of normal fatty signal in the sinus tarsi.    Assessment/Plan: 1.  Left inferolateral hindfoot pain with evidence of peroneal tendon injury  2.  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  Left moderate subtalar joint narrowing with hindfoot valgus and lack of sinus tarsi normal fat signal intensity with subchondral edema in the lateral subtalar articulation  4.  Left moderate talonavicular and calcaneocuboid joint space narrowing  5.  Left midfoot moderate tarsometatarsal joint space narrowing most pronounced at first and second TMT joint  6.  Left tibiotalar joint space narrowing  7.  Right inferolateral hindfoot pain with possible peroneal tendon tear and/or symptomatic subtalar arthritis  8.  Right inferomedial hindfoot pain with possible posterior tib tendon tear versus symptomatic pes planus    We discussed treatment going forward and fitted him with ASO braces bilaterally for stability during ambulation. .  I would not recommend surgical treatment at this time but could eventually require as such.    Also prescribed compounding cream for him to apply several times daily.  He said he had some trouble with Voltaren cream in the past affecting his blood thinner and he will watch this carefully and understands ramifications thereof.    Will go ahead and get an MRI of his right hindfoot to evaluate for any stress fracture tendon or ligament tear in order to help tailor  treatment protocol    Depending what we see there may end up needing bilateral radiographic guided injections I think it is important to get this prior to proceeding with that.      As we were finishing he mentioned he had some swelling around his left and recommended that he see him for that.  Medial clavicle and he has seen Dr. Garcia in the past    Will see him back in about 3 weeks no x-rays.

## 2024-03-21 ENCOUNTER — HOSPITAL ENCOUNTER (OUTPATIENT)
Dept: CARDIOLOGY | Facility: HOSPITAL | Age: 74
Discharge: HOME | End: 2024-03-21
Payer: MEDICARE

## 2024-03-21 VITALS
BODY MASS INDEX: 32.21 KG/M2 | SYSTOLIC BLOOD PRESSURE: 152 MMHG | HEART RATE: 64 BPM | DIASTOLIC BLOOD PRESSURE: 74 MMHG | WEIGHT: 225 LBS | HEIGHT: 70 IN

## 2024-03-21 DIAGNOSIS — R06.09 DYSPNEA ON EXERTION: ICD-10-CM

## 2024-03-21 DIAGNOSIS — I25.118 CORONARY ARTERY DISEASE OF NATIVE ARTERY OF NATIVE HEART WITH STABLE ANGINA PECTORIS: ICD-10-CM

## 2024-03-21 LAB
AORTIC ARCH: 2.8 CM
AORTIC DIMENSIONLESS INDEX: 0.9 (DI)
ASCENDING AORTA: 2.9 CM
BH CV ECHO LEFT VENTRICLE GLOBAL LONGITUDINAL STRAIN: -24.7 %
BH CV ECHO MEAS - ACS: 1.55 CM
BH CV ECHO MEAS - AO MAX PG: 4.6 MMHG
BH CV ECHO MEAS - AO MEAN PG: 2 MMHG
BH CV ECHO MEAS - AO ROOT DIAM: 3.2 CM
BH CV ECHO MEAS - AO V2 MAX: 107 CM/SEC
BH CV ECHO MEAS - AO V2 VTI: 23.7 CM
BH CV ECHO MEAS - AVA(I,D): 3 CM2
BH CV ECHO MEAS - EDV(CUBED): 91.1 ML
BH CV ECHO MEAS - EDV(MOD-SP2): 99 ML
BH CV ECHO MEAS - EDV(MOD-SP4): 83 ML
BH CV ECHO MEAS - EF(MOD-BP): 64 %
BH CV ECHO MEAS - EF(MOD-SP2): 64.6 %
BH CV ECHO MEAS - EF(MOD-SP4): 61.4 %
BH CV ECHO MEAS - EF_3D-VOL: 59 %
BH CV ECHO MEAS - ESV(CUBED): 16 ML
BH CV ECHO MEAS - ESV(MOD-SP2): 35 ML
BH CV ECHO MEAS - ESV(MOD-SP4): 32 ML
BH CV ECHO MEAS - FS: 44 %
BH CV ECHO MEAS - IVS/LVPW: 1.2 CM
BH CV ECHO MEAS - IVSD: 1.2 CM
BH CV ECHO MEAS - LA 3D VOL INDEX: 40
BH CV ECHO MEAS - LAT PEAK E' VEL: 5.3 CM/SEC
BH CV ECHO MEAS - LV DIASTOLIC VOL/BSA (35-75): 37.8 CM2
BH CV ECHO MEAS - LV MASS(C)D: 175 GRAMS
BH CV ECHO MEAS - LV MAX PG: 4.4 MMHG
BH CV ECHO MEAS - LV MEAN PG: 2 MMHG
BH CV ECHO MEAS - LV SYSTOLIC VOL/BSA (12-30): 14.6 CM2
BH CV ECHO MEAS - LV V1 MAX: 105 CM/SEC
BH CV ECHO MEAS - LV V1 VTI: 22.4 CM
BH CV ECHO MEAS - LVIDD: 4.5 CM
BH CV ECHO MEAS - LVIDS: 2.5 CM
BH CV ECHO MEAS - LVOT AREA: 3.2 CM2
BH CV ECHO MEAS - LVOT DIAM: 2.03 CM
BH CV ECHO MEAS - LVPWD: 1 CM
BH CV ECHO MEAS - MED PEAK E' VEL: 8.8 CM/SEC
BH CV ECHO MEAS - MV A DUR: 0.2 SEC
BH CV ECHO MEAS - MV A MAX VEL: 75.4 CM/SEC
BH CV ECHO MEAS - MV DEC SLOPE: 263.7 CM/SEC2
BH CV ECHO MEAS - MV DEC TIME: 0.28 SEC
BH CV ECHO MEAS - MV E MAX VEL: 60.8 CM/SEC
BH CV ECHO MEAS - MV E/A: 0.81
BH CV ECHO MEAS - MV MAX PG: 2.7 MMHG
BH CV ECHO MEAS - MV MEAN PG: 1.13 MMHG
BH CV ECHO MEAS - MV P1/2T: 96.5 MSEC
BH CV ECHO MEAS - MV V2 VTI: 27.7 CM
BH CV ECHO MEAS - MVA(P1/2T): 2.28 CM2
BH CV ECHO MEAS - MVA(VTI): 2.6 CM2
BH CV ECHO MEAS - PA ACC TIME: 0.08 SEC
BH CV ECHO MEAS - PA V2 MAX: 104.5 CM/SEC
BH CV ECHO MEAS - PULM A REVS DUR: 0.16 SEC
BH CV ECHO MEAS - PULM A REVS VEL: 43.3 CM/SEC
BH CV ECHO MEAS - PULM DIAS VEL: 35.7 CM/SEC
BH CV ECHO MEAS - PULM S/D: 1.44
BH CV ECHO MEAS - PULM SYS VEL: 51.4 CM/SEC
BH CV ECHO MEAS - QP/QS: 0.6
BH CV ECHO MEAS - RAP SYSTOLE: 3 MMHG
BH CV ECHO MEAS - RV MAX PG: 2.27 MMHG
BH CV ECHO MEAS - RV V1 MAX: 75.4 CM/SEC
BH CV ECHO MEAS - RV V1 VTI: 15.2 CM
BH CV ECHO MEAS - RVOT DIAM: 1.9 CM
BH CV ECHO MEAS - RVSP: 19 MMHG
BH CV ECHO MEAS - SI(MOD-SP2): 29.2 ML/M2
BH CV ECHO MEAS - SI(MOD-SP4): 23.2 ML/M2
BH CV ECHO MEAS - SUP REN AO DIAM: 2.4 CM
BH CV ECHO MEAS - SV(LVOT): 72.3 ML
BH CV ECHO MEAS - SV(MOD-SP2): 64 ML
BH CV ECHO MEAS - SV(MOD-SP4): 51 ML
BH CV ECHO MEAS - SV(RVOT): 43.3 ML
BH CV ECHO MEAS - TAPSE (>1.6): 1.8 CM
BH CV ECHO MEAS - TR MAX PG: 16 MMHG
BH CV ECHO MEAS - TR MAX VEL: 200 CM/SEC
BH CV ECHO MEASUREMENTS AVERAGE E/E' RATIO: 8.62
BH CV XLRA - RV BASE: 3.8 CM
BH CV XLRA - RV LENGTH: 8.4 CM
BH CV XLRA - RV MID: 3.1 CM
BH CV XLRA - TDI S': 12.3 CM/SEC
LEFT ATRIUM VOLUME INDEX: 21.5 ML/M2
SINUS: 2.9 CM
STJ: 2.7 CM

## 2024-03-21 PROCEDURE — 93356 MYOCRD STRAIN IMG SPCKL TRCK: CPT

## 2024-03-21 PROCEDURE — 93306 TTE W/DOPPLER COMPLETE: CPT

## 2024-03-25 ENCOUNTER — OFFICE VISIT (OUTPATIENT)
Dept: ORTHOPEDIC SURGERY | Facility: CLINIC | Age: 74
End: 2024-03-25
Payer: MEDICARE

## 2024-03-25 VITALS — TEMPERATURE: 98.1 F | HEIGHT: 70 IN | BODY MASS INDEX: 30.82 KG/M2 | WEIGHT: 215.3 LBS

## 2024-03-25 DIAGNOSIS — M89.8X1 PAIN OF LEFT CLAVICLE: Primary | ICD-10-CM

## 2024-03-25 PROCEDURE — 1160F RVW MEDS BY RX/DR IN RCRD: CPT | Performed by: ORTHOPAEDIC SURGERY

## 2024-03-25 PROCEDURE — 99213 OFFICE O/P EST LOW 20 MIN: CPT | Performed by: ORTHOPAEDIC SURGERY

## 2024-03-25 PROCEDURE — 1159F MED LIST DOCD IN RCRD: CPT | Performed by: ORTHOPAEDIC SURGERY

## 2024-03-25 PROCEDURE — 73000 X-RAY EXAM OF COLLAR BONE: CPT | Performed by: ORTHOPAEDIC SURGERY

## 2024-03-25 RX ORDER — CELECOXIB 200 MG/1
200 CAPSULE ORAL 2 TIMES DAILY PRN
Qty: 60 CAPSULE | Refills: 2 | Status: SHIPPED | OUTPATIENT
Start: 2024-03-25

## 2024-03-25 NOTE — PROGRESS NOTES
Patient: Karan Graham    YOB: 1950    Medical Record Number: [id number]    Chief Complaints:  Left shoulder pain and swelling      History of Present Illness:     73 y.o. male patient who presents for his left shoulder.  He says that this for started to bother him about 2 or 3 months ago.  He cannot recall any specific inciting event or factor.  He he noticed some prominence at the anterior aspect of his shoulder, right in the front of his neck.  He has been having some mild soreness in that area.  He was concerned that it could be a tumor so he wanted to get this checked out.  Ever since he noticed the swollen area, he says that it has not really changed in size or character.  He still has good use and function of his left arm.    Allergies:   Allergies   Allergen Reactions    Codeine Diarrhea and Nausea And Vomiting    Ibuprofen Nausea And Vomiting       Home Medications:      Current Outpatient Medications:     albuterol sulfate  (90 Base) MCG/ACT inhaler, INHALE 2 PUFFS EVERY 6 HOURS AS NEEDED FOR SHORTNESS OF BREATH, Disp: , Rfl:     aspirin 81 MG chewable tablet, Chew 1 tablet Daily., Disp: , Rfl:     atorvastatin (LIPITOR) 80 MG tablet, TAKE 1 TABLET BY MOUTH EVERY NIGHT, Disp: 90 tablet, Rfl: 1    clindamycin (CLEOCIN T) 1 % lotion, APPLY A THIN LAYER TO THE AFFECTED AREA ON THE LEFT ARM TWICE DAILY DURING FLARES AND ONCE DAILY FOR MAINTENANCE, Disp: , Rfl:     clopidogrel (PLAVIX) 75 MG tablet, TAKE 1 TABLET BY MOUTH DAILY, Disp: 90 tablet, Rfl: 1    diazePAM (VALIUM) 5 MG tablet, , Disp: , Rfl:     guaiFENesin (MUCINEX PO), Take  by mouth As Needed., Disp: , Rfl:     Ibuprofen 3 %, Gabapentin 10 %, Baclofen 2 %, lidocaine 4 %, Ketamine HCl 4 %, Apply 1-2 g topically to the appropriate area as directed 3 (Three) to 4 (Four) times daily., Disp: 90 g, Rfl: 1    ketoconazole (NIZORAL) 2 % cream, Apply  topically to the appropriate area as directed., Disp: , Rfl:     Loratadine 10 MG  capsule, Take  by mouth., Disp: , Rfl:     methocarbamol (ROBAXIN) 500 MG tablet, Take 1 tablet by mouth As Needed., Disp: , Rfl:     metoprolol succinate XL (TOPROL-XL) 25 MG 24 hr tablet, Take 1 tablet by mouth Daily., Disp: 90 tablet, Rfl: 3    nitroglycerin (NITROSTAT) 0.4 MG SL tablet, Place 1 tablet under the tongue Every 5 (Five) Minutes As Needed for Chest Pain. Take no more than 3 doses in 15 minutes., Disp: 30 tablet, Rfl: 11    pantoprazole (PROTONIX) 40 MG EC tablet, Take 1 tablet by mouth Every Night for 180 days., Disp: 90 tablet, Rfl: 1    ranolazine (Ranexa) 500 MG 12 hr tablet, Take 1 tablet by mouth 2 (Two) Times a Day., Disp: 180 tablet, Rfl: 3    tamsulosin (FLOMAX) 0.4 MG capsule 24 hr capsule, Take 1 capsule by mouth 2 (Two) Times a Day., Disp: , Rfl:     triamcinolone (KENALOG) 0.1 % ointment, Apply 1 application topically to the appropriate area as directed 2 (Two) Times a Day., Disp: 30 g, Rfl: 2    celecoxib (CeleBREX) 200 MG capsule, Take 1 capsule by mouth 2 (Two) Times a Day As Needed for Mild Pain or Moderate Pain (Take as directed with food.)., Disp: 60 capsule, Rfl: 2    Past Medical History:   Diagnosis Date    Arthritis of back     Years ago    Asthma     CAD (coronary artery disease)     hx stents june 2020    Callus     Clotting disorder     Blood thinners    GERD (gastroesophageal reflux disease) Many years ago    Hypertension     Knee swelling Years ago    Low back strain     Lumbosacral disc disease     Other allergic rhinitis 11/03/2022    Pancreas (digestive gland) works poorly     Rotator cuff syndrome     Skin cancer     Sleep apnea        Past Surgical History:   Procedure Laterality Date    BACK SURGERY      CARDIAC CATHETERIZATION  06/2020    CARDIAC CATHETERIZATION N/A 02/21/2023    Procedure: Left Heart Cath;  Surgeon: Eduard Pedroza MD;  Location: [facility] CATH INVASIVE LOCATION;  Service: Cardiovascular;  Laterality: N/A;    CARDIAC CATHETERIZATION N/A 02/21/2023     Procedure: Coronary angiography;  Surgeon: Eduard Pedroza MD;  Location: [facility] INVASIVE LOCATION;  Service: Cardiovascular;  Laterality: N/A;    CHOLECYSTECTOMY      COLONOSCOPY  2020    NORMAL    COLONOSCOPY  2023    POLYPS    CORONARY ANGIOPLASTY WITH STENT PLACEMENT      TWICE    FRACTURE SURGERY  Various    HAND SURGERY      Years ago    HEMORRHOIDECTOMY      PANCREAS SURGERY      STENTING    SHOULDER SURGERY      SINUS SURGERY  2022    Deviated septum    TOE SURGERY Right     big toe    TONSILLECTOMY      TUMOR REMOVAL      Ro[place] Of Mouth       Social History     Occupational History    Not on file   Tobacco Use    Smoking status: Never     Passive exposure: Past    Smokeless tobacco: Never   Vaping Use    Vaping status: Never Used   Substance and Sexual Activity    Alcohol use: Yes     Comment: Don’t drink weekly    Drug use: No    Sexual activity: Yes     Partners: Female     Birth control/protection: None      Social History     Social History Narrative    Not on file       Family History   Problem Relation Age of Onset    Hypertension Mother     Leukemia Mother     No Known Problems Father     Hypertension Sister     Heart disease Brother     Cancer Brother         Colon cancer    Cancer Brother         Prostate    Cancer Brother         Lung cancer    Cancer Brother         Lung cancer    No Known Problems Maternal Aunt     No Known Problems Maternal Uncle     No Known Problems Paternal Aunt     No Known Problems Paternal Uncle     No Known Problems Maternal Grandmother     No Known Problems Maternal Grandfather     No Known Problems Paternal Grandmother     No Known Problems Paternal Grandfather     Cancer Other     Anesthesia problems Neg Hx     Broken bones Neg Hx     Clotting disorder Neg Hx     Collagen disease Neg Hx     Diabetes Neg Hx     Dislocations Neg Hx     Osteoporosis Neg Hx     Rheumatologic disease Neg Hx     Scoliosis Neg Hx     Severe sprains Neg Hx        Review of Systems:  "     Constitutional: Denies fever, shaking or chills   Eyes: Denies change in visual acuity   HEENT: Denies nasal congestion or sore throat   Respiratory: Denies cough or shortness of breath   Cardiovascular: Denies chest pain or edema  Endocrine: Denies tremors, palpitations, intolerance of heat or cold, polyuria, polydipsia.  GI: Denies abdominal pain, nausea, vomiting, bloody stools or diarrhea  : Denies frequency, urgency, incontinence, retention, or nocturia.  Musculoskeletal: Denies numbness, tingling or loss of motor function except as above  Integument: Denies rash, lesion or ulceration   Neurologic: Denies headache or focal weakness, deficits  Heme: Denies spontaneous or excessive bleeding, epistaxis, hematuria, melena, fatigue, enlarged or tender lymph nodes.      All other pertinent positives and negatives as noted above in HPI.    Physical Exam: 73 y.o. male    Vitals:    03/25/24 0917   Temp: 98.1 °F (36.7 °C)   TempSrc: Temporal   Weight: 97.7 kg (215 lb 4.8 oz)   Height: 177.8 cm (70\")       General:  Patient is awake and alert.  Appears in no acute distress or discomfort.    Psych:  Affect and demeanor are appropriate.    Eyes:  Conjunctiva and sclera appear grossly normal.  Eyes track well and EOM seem to be intact.    Ears:  No gross abnormalities.  Hearing adequate for the exam.    Cardiovascular:  Regular rate and rhythm.    Lungs:  Good chest expansion.  Breathing unlabored.    Lymph:  No palpable adenopathy in the left upper extremity    Extremities: Left upper extremity is examined.  He has prominence of the medial clavicle at the sternoclavicular joint.  Mild tenderness over the left sternoclavicular joint.  No effusion or increased warmth.  No discrete mass.  He has full shoulder motion.  Good strength with abduction, elevation, internal and external rotation.  He has normal motor and sensory function throughout his arm and hand.  Brisk capillary refill.  Palpable radial pulse.       "   Radiology:   AP and orthogonal views left clavicle are ordered and reviewed to evaluate his complaint.  The left sternoclavicular joint is poorly visualized but I do not clearly see any mass or other concerning finding.    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  We talked about the natural history of this condition.  I offered him an injection.  He says the pain is not bad enough to justify an injection.  He was interested in trying a prescription strength anti-inflammatory.  I agreed to give him a prescription for Celebrex.  We did talk about the risk of this medicine with respect to his Plavix therapy.  He understands that is only to be taken as needed and I do recommend that he get clearance from his cardiologist.  Going forward, if he wants to continue on that medicine, I told him that he should get future refills from his PCP.  He needs to keep an eye on that area.  If it is enlarging or he notices any other changes, I told her to let me know and I will for him for advanced imaging.  Otherwise, we will manage this issue expectantly for now.    Torres Garcia MD    03/25/2024    CC to Marshal Light MD

## 2024-03-26 ENCOUNTER — APPOINTMENT (OUTPATIENT)
Dept: GENERAL RADIOLOGY | Facility: HOSPITAL | Age: 74
End: 2024-03-26
Payer: MEDICARE

## 2024-03-26 ENCOUNTER — HOSPITAL ENCOUNTER (EMERGENCY)
Facility: HOSPITAL | Age: 74
Discharge: HOME | End: 2024-03-27
Attending: EMERGENCY MEDICINE | Admitting: EMERGENCY MEDICINE
Payer: MEDICARE

## 2024-03-26 VITALS
HEIGHT: 70 IN | OXYGEN SATURATION: 98 % | HEART RATE: 66 BPM | TEMPERATURE: 98.1 F | RESPIRATION RATE: 20 BRPM | BODY MASS INDEX: 32.57 KG/M2 | SYSTOLIC BLOOD PRESSURE: 136 MMHG | WEIGHT: 227.51 LBS | DIASTOLIC BLOOD PRESSURE: 72 MMHG

## 2024-03-26 DIAGNOSIS — R07.9 CHEST PAIN, UNSPECIFIED TYPE: Primary | ICD-10-CM

## 2024-03-26 LAB
ALBUMIN SERPL-MCNC: 4.2 G/DL (ref 3.5–5.2)
ALBUMIN/GLOB SERPL: 1.9 G/DL
ALP SERPL-CCNC: 84 U/L (ref 39–117)
ALT SERPL W P-5'-P-CCNC: 17 U/L (ref 1–41)
ANION GAP SERPL CALCULATED.3IONS-SCNC: 10.3 MMOL/L (ref 5–15)
AST SERPL-CCNC: 18 U/L (ref 1–40)
BASOPHILS # BLD AUTO: 0.07 10*3/MM3 (ref 0–0.2)
BASOPHILS NFR BLD AUTO: 1.1 % (ref 0–1.5)
BILIRUB SERPL-MCNC: 0.8 MG/DL (ref 0–1.2)
BUN SERPL-MCNC: 12 MG/DL (ref 8–23)
BUN/CREAT SERPL: 11.3 (ref 7–25)
CALCIUM SPEC-SCNC: 9.1 MG/DL (ref 8.6–10.5)
CHLORIDE SERPL-SCNC: 105 MMOL/L (ref 98–107)
CO2 SERPL-SCNC: 24.7 MMOL/L (ref 22–29)
CREAT SERPL-MCNC: 1.06 MG/DL (ref 0.76–1.27)
DEPRECATED RDW RBC AUTO: 41.3 FL (ref 37–54)
EGFRCR SERPLBLD CKD-EPI 2021: 75 ML/MIN/1.73
EOSINOPHIL # BLD AUTO: 0.16 10*3/MM3 (ref 0–0.4)
EOSINOPHIL NFR BLD AUTO: 2.5 % (ref 0.3–6.2)
ERYTHROCYTE [DISTWIDTH] IN BLOOD BY AUTOMATED COUNT: 12.7 % (ref 12.3–15.4)
GLOBULIN UR ELPH-MCNC: 2.2 GM/DL
GLUCOSE SERPL-MCNC: 103 MG/DL (ref 65–99)
HCT VFR BLD AUTO: 40.5 % (ref 37.5–51)
HGB BLD-MCNC: 13.9 G/DL (ref 13–17.7)
HOLD SPECIMEN: NORMAL
HOLD SPECIMEN: NORMAL
IMM GRANULOCYTES # BLD AUTO: 0.01 10*3/MM3 (ref 0–0.05)
IMM GRANULOCYTES NFR BLD AUTO: 0.2 % (ref 0–0.5)
LIPASE SERPL-CCNC: 51 U/L (ref 13–60)
LYMPHOCYTES # BLD AUTO: 2.31 10*3/MM3 (ref 0.7–3.1)
LYMPHOCYTES NFR BLD AUTO: 35.5 % (ref 19.6–45.3)
MAGNESIUM SERPL-MCNC: 2.2 MG/DL (ref 1.6–2.4)
MCH RBC QN AUTO: 30.3 PG (ref 26.6–33)
MCHC RBC AUTO-ENTMCNC: 34.3 G/DL (ref 31.5–35.7)
MCV RBC AUTO: 88.4 FL (ref 79–97)
MONOCYTES # BLD AUTO: 0.69 10*3/MM3 (ref 0.1–0.9)
MONOCYTES NFR BLD AUTO: 10.6 % (ref 5–12)
NEUTROPHILS NFR BLD AUTO: 3.27 10*3/MM3 (ref 1.7–7)
NEUTROPHILS NFR BLD AUTO: 50.1 % (ref 42.7–76)
NRBC BLD AUTO-RTO: 0 /100 WBC (ref 0–0.2)
NT-PROBNP SERPL-MCNC: <36 PG/ML (ref 0–900)
PLATELET # BLD AUTO: 180 10*3/MM3 (ref 140–450)
PMV BLD AUTO: 10.7 FL (ref 6–12)
POTASSIUM SERPL-SCNC: 4.1 MMOL/L (ref 3.5–5.2)
PROT SERPL-MCNC: 6.4 G/DL (ref 6–8.5)
QT INTERVAL: 427 MS
QTC INTERVAL: 455 MS
RBC # BLD AUTO: 4.58 10*6/MM3 (ref 4.14–5.8)
SODIUM SERPL-SCNC: 140 MMOL/L (ref 136–145)
TROPONIN T SERPL HS-MCNC: 6 NG/L
WBC NRBC COR # BLD AUTO: 6.51 10*3/MM3 (ref 3.4–10.8)
WHOLE BLOOD HOLD COAG: NORMAL
WHOLE BLOOD HOLD SPECIMEN: NORMAL

## 2024-03-26 PROCEDURE — 84484 ASSAY OF TROPONIN QUANT: CPT | Performed by: EMERGENCY MEDICINE

## 2024-03-26 PROCEDURE — 36415 COLL VENOUS BLD VENIPUNCTURE: CPT

## 2024-03-26 PROCEDURE — 80053 COMPREHEN METABOLIC PANEL: CPT

## 2024-03-26 PROCEDURE — 93005 ELECTROCARDIOGRAM TRACING: CPT

## 2024-03-26 PROCEDURE — 99284 EMERGENCY DEPT VISIT MOD MDM: CPT

## 2024-03-26 PROCEDURE — 83690 ASSAY OF LIPASE: CPT

## 2024-03-26 PROCEDURE — 71045 X-RAY EXAM CHEST 1 VIEW: CPT

## 2024-03-26 PROCEDURE — 93005 ELECTROCARDIOGRAM TRACING: CPT | Performed by: EMERGENCY MEDICINE

## 2024-03-26 PROCEDURE — 85025 COMPLETE CBC W/AUTO DIFF WBC: CPT

## 2024-03-26 PROCEDURE — 83880 ASSAY OF NATRIURETIC PEPTIDE: CPT

## 2024-03-26 PROCEDURE — 83735 ASSAY OF MAGNESIUM: CPT

## 2024-03-26 PROCEDURE — 84484 ASSAY OF TROPONIN QUANT: CPT

## 2024-03-26 RX ORDER — ASPIRIN 81 MG/1
324 TABLET, CHEWABLE ORAL ONCE
Status: COMPLETED | OUTPATIENT
Start: 2024-03-26 | End: 2024-03-26

## 2024-03-26 RX ORDER — SODIUM CHLORIDE 0.9 % (FLUSH) 0.9 %
10 SYRINGE (ML) INJECTION AS NEEDED
Status: DISCONTINUED | OUTPATIENT
Start: 2024-03-26 | End: 2024-03-27 | Stop reason: HOSPADM

## 2024-03-26 RX ADMIN — ASPIRIN 324 MG: 81 TABLET, CHEWABLE ORAL at 20:45

## 2024-03-27 ENCOUNTER — TELEPHONE (OUTPATIENT)
Dept: CARDIOLOGY | Facility: CLINIC | Age: 74
End: 2024-03-27
Payer: MEDICARE

## 2024-03-27 ENCOUNTER — TELEPHONE (OUTPATIENT)
Dept: ORTHOPEDIC SURGERY | Facility: CLINIC | Age: 74
End: 2024-03-27

## 2024-03-27 LAB
GEN 5 2HR TROPONIN T REFLEX: 7 NG/L
TROPONIN T DELTA: 1 NG/L

## 2024-03-27 NOTE — ED NOTES
Took a nitro 30 minutes ago.  Wife reports he was driving down road and had sharp chest pain.  Patient states pain is a 3/10 dull pain currently.  Patient states he's having shortness of breathe and tightness.      Patient states he had an echo on Thursday but hasn't received results yet

## 2024-03-27 NOTE — ED TRIAGE NOTES
Pt comes to the ER tonight for chest pain. Pt states that he started having sharp chest pain about 15 mins ago. Pt states that he has 2 stents placed in his heart.

## 2024-03-27 NOTE — ED PROVIDER NOTES
Time: 8:16 PM EDT  Date of encounter:  3/26/2024  Independent Historian/Clinical History and Information was obtained by:   Patient    History is limited by: N/A    Chief Complaint   Patient presents with    Chest Pain         History of Present Illness:  Patient is a 73 y.o. year old male who presents to the emergency department for evaluation of chest pain that started 15 minutes PTA.  Patient has taken nitro.  States that he has a history of 2 stents from 2 different times.  States that he feels like he is having a heart attack.  Denies any shortness of breath, fever, chills. (Triage Provider: Stephon Reyes PA-C).  Minimal pain at this time. No associated symptoms      Patient Care Team  Primary Care Provider: Marshal Light MD    Past Medical History:     Allergies   Allergen Reactions    Codeine Diarrhea and Nausea And Vomiting    Ibuprofen Nausea And Vomiting     Past Medical History:   Diagnosis Date    Arthritis of back     Years ago    Asthma     CAD (coronary artery disease)     hx stents june 2020    Callus     Clotting disorder     Blood thinners    GERD (gastroesophageal reflux disease) Many years ago    Hypertension     Knee swelling Years ago    Low back strain     Lumbosacral disc disease     Other allergic rhinitis 11/03/2022    Pancreas (digestive gland) works poorly     Rotator cuff syndrome     Skin cancer     Sleep apnea      Past Surgical History:   Procedure Laterality Date    BACK SURGERY      CARDIAC CATHETERIZATION  06/2020    CARDIAC CATHETERIZATION N/A 02/21/2023    Procedure: Left Heart Cath;  Surgeon: Eduard Pedroza MD;  Location:  [facility];  Service: Cardiovascular;  Laterality: N/A;    CARDIAC CATHETERIZATION N/A 02/21/2023    Procedure: Coronary angiography;  Surgeon: Eduard Pedroza MD;  Location:  [facility];  Service: Cardiovascular;  Laterality: N/A;    CHOLECYSTECTOMY      COLONOSCOPY  2020    NORMAL    COLONOSCOPY  2023     POLYPS    CORONARY ANGIOPLASTY WITH STENT PLACEMENT      TWICE    FRACTURE SURGERY  Various    HAND SURGERY      Years ago    HEMORRHOIDECTOMY      PANCREAS SURGERY      STENTING    SHOULDER SURGERY      SINUS SURGERY  2022    Deviated septum    TOE SURGERY Right     big toe    TONSILLECTOMY      TUMOR REMOVAL      [place] Mouth     Family History   Problem Relation Age of Onset    Hypertension Mother     Leukemia Mother     No Known Problems Father     Hypertension Sister     Heart disease Brother     Cancer Brother         Colon cancer    Cancer Brother         Prostate    Cancer Brother         Lung cancer    Cancer Brother         Lung cancer    No Known Problems Maternal Aunt     No Known Problems Maternal Uncle     No Known Problems Paternal Aunt     No Known Problems Paternal Uncle     No Known Problems Maternal Grandmother     No Known Problems Maternal Grandfather     No Known Problems Paternal Grandmother     No Known Problems Paternal Grandfather     Cancer Other     Anesthesia problems Neg Hx     Broken bones Neg Hx     Clotting disorder Neg Hx     Collagen disease Neg Hx     Diabetes Neg Hx     Dislocations Neg Hx     Osteoporosis Neg Hx     Rheumatologic disease Neg Hx     Scoliosis Neg Hx     Severe sprains Neg Hx        Home Medications:  Prior to Admission medications    Medication Sig Start Date End Date Taking? Authorizing Provider   albuterol sulfate  (90 Base) MCG/ACT inhaler INHALE 2 PUFFS EVERY 6 HOURS AS NEEDED FOR SHORTNESS OF BREATH 5/15/22   ProviderDanelle MD   aspirin 81 MG chewable tablet Chew 1 tablet Daily.    Provider, MD Danelle   atorvastatin (LIPITOR) 80 MG tablet TAKE 1 TABLET BY MOUTH EVERY NIGHT 12/18/23   Marshal Light MD   celecoxib (CeleBREX) 200 MG capsule Take 1 capsule by mouth 2 (Two) Times a Day As Needed for Mild Pain or Moderate Pain (Take as directed with food.). 3/25/24   Torres Garcia MD   clindamycin (CLEOCIN T) 1 % lotion APPLY A  THIN LAYER TO THE AFFECTED AREA ON THE LEFT ARM TWICE DAILY DURING FLARES AND ONCE DAILY FOR MAINTENANCE 9/18/23   Danelle Molina MD   clopidogrel (PLAVIX) 75 MG tablet TAKE 1 TABLET BY MOUTH DAILY 11/6/23   Marshal Light MD   diazePAM (VALIUM) 5 MG tablet  11/14/23   Danelle Molina MD   guaiFENesin (MUCINEX PO) Take  by mouth As Needed.    Danelle Molina MD   Ibuprofen 3 %, Gabapentin 10 %, Baclofen 2 %, lidocaine 4 %, Ketamine HCl 4 % Apply 1-2 g topically to the appropriate area as directed 3 (Three) to 4 (Four) times daily. 3/4/24 3/4/25  Amandeep Lim MD   ketoconazole (NIZORAL) 2 % cream Apply  topically to the appropriate area as directed. 9/8/23   Danelle Molina MD   Loratadine 10 MG capsule Take  by mouth.    Danelle Molina MD   methocarbamol (ROBAXIN) 500 MG tablet Take 1 tablet by mouth As Needed. 12/8/23   Danelle Molian MD   metoprolol succinate XL (TOPROL-XL) 25 MG 24 hr tablet Take 1 tablet by mouth Daily. 4/24/23   Sunshine Birmingham APRN   nitroglycerin (NITROSTAT) 0.4 MG SL tablet Place 1 tablet under the tongue Every 5 (Five) Minutes As Needed for Chest Pain. Take no more than 3 doses in 15 minutes. 1/8/24   Eduard Pedroza MD   pantoprazole (PROTONIX) 40 MG EC tablet Take 1 tablet by mouth Every Night for 180 days. 11/1/23 4/29/24  Marshal Light MD   ranolazine (Ranexa) 500 MG 12 hr tablet Take 1 tablet by mouth 2 (Two) Times a Day. 8/10/23   Eduard Pedroza MD   tamsulosin (FLOMAX) 0.4 MG capsule 24 hr capsule Take 1 capsule by mouth 2 (Two) Times a Day.    Danelle Molina MD   triamcinolone (KENALOG) 0.1 % ointment Apply 1 application topically to the appropriate area as directed 2 (Two) Times a Day. 9/8/22   Judie Charlton APRN        Social History:   Social History     Tobacco Use    Smoking status: Never     Passive exposure: Past    Smokeless tobacco: Never   Vaping Use    Vaping status: Never Used  "  Substance Use Topics    Alcohol use: Yes     Comment: Don’t drink weekly    Drug use: No         Review of Systems:  Review of Systems   Constitutional:  Negative for chills and fever.   HENT:  Negative for congestion, ear pain and sore throat.    Eyes:  Negative for pain.   Respiratory:  Negative for cough, chest tightness and shortness of breath.    Cardiovascular:  Positive for chest pain.   Gastrointestinal:  Negative for abdominal pain, diarrhea, nausea and vomiting.   Genitourinary:  Negative for flank pain and hematuria.   Musculoskeletal:  Negative for joint swelling.   Skin:  Negative for pallor.   Neurological:  Negative for seizures and headaches.   All other systems reviewed and are negative.       Physical Exam:  /72   Pulse 66   Temp 98.1 °F (36.7 °C) (Oral)   Resp 20   Ht 177.8 cm (70\")   Wt 103 kg (227 lb 8.2 oz)   SpO2 98%   BMI 32.65 kg/m²         Physical Exam  Constitutional:       Appearance: Normal appearance.   HENT:      Head: Normocephalic and atraumatic.      Nose: Nose normal.      Mouth/Throat:      Mouth: Mucous membranes are moist.   Eyes:      Extraocular Movements: Extraocular movements intact.      Conjunctiva/sclera: Conjunctivae normal.      Pupils: Pupils are equal, round, and reactive to light.   Cardiovascular:      Rate and Rhythm: Normal rate and regular rhythm.      Pulses: Normal pulses.      Heart sounds: Normal heart sounds.   Pulmonary:      Effort: Pulmonary effort is normal.      Breath sounds: Normal breath sounds.   Abdominal:      General: There is no distension.      Palpations: Abdomen is soft.      Tenderness: There is no abdominal tenderness.   Musculoskeletal:         General: Normal range of motion.      Cervical back: Normal range of motion.   Skin:     General: Skin is warm and dry.      Capillary Refill: Capillary refill takes less than 2 seconds.      Coloration: Skin is not cyanotic.   Neurological:      General: No focal deficit present.    "   Mental Status: He is alert and oriented to person, place, and time. Mental status is at baseline.   Psychiatric:         Attention and Perception: Attention and perception normal.         Mood and Affect: Mood normal.         Behavior: Behavior normal.                      Procedures:  Procedures      Medical Decision Making:      Comorbidities that affect care:    Asthma, Coronary Artery Disease, Hypertension    External Notes reviewed:    Previous Radiological Studies: Heart cath and Echocardiogram  Heart Cath 2/23/23  Conclusions:  No significant coronary artery disease with patent stents in the proximal to mid LAD as well as distal RCA and the PDA with no significant in-stent restenosis.  Circumflex contains luminal irregularities.  There is some small vessel disease within the very distal circumflex but small in caliber size and distribution.  Normal LVEDP of 6 mmHg    Echocardiogram 3/21/24    Left ventricular systolic function is normal. Calculated left ventricular EF = 64%    Left ventricular diastolic function was normal.    There is mild calcification of the aortic valve.    Estimated right ventricular systolic pressure from tricuspid regurgitation is normal (<35 mmHg).    The following orders were placed and all results were independently analyzed by me:  Orders Placed This Encounter   Procedures    XR Chest 1 View    [place] Draw    High Sensitivity Troponin T    Comprehensive Metabolic Panel    Lipase    BNP    Magnesium    CBC Auto Differential    High Sensitivity Troponin T 2Hr    NPO Diet NPO Type: Strict NPO    Undress & Gown    Continuous Pulse Oximetry    Oxygen Therapy- Nasal Cannula; Titrate 1-6 LPM Per SpO2; 90 - 95%    ECG 12 Lead ED Triage Standing Order; Chest Pain    Insert Peripheral IV    CBC & Differential    Green Top (Gel)    Lavender Top    Gold Top - SST    Light Blue Top       Medications Given in the Emergency Department:  Medications   sodium chloride 0.9 % flush 10 mL (has no  administration in time range)   aspirin chewable tablet 324 mg (324 mg Oral Given 3/26/24 2045)        ED Course:    The patient was initially evaluated in the triage area where orders were placed. The patient was later dispositioned by Pratik Shields MD.      The patient was advised to stay for completion of workup which includes but is not limited to communication of labs and radiological results, reassessment and plan. The patient was advised that leaving prior to disposition by a provider could result in critical findings that are not communicated to the patient.     ED Course as of 03/27/24 0202   Tue Mar 26, 2024   2017 PROVIDER IN TRIAGE  Patient was evaluated by Stephon grant PA-C. Orders were placed and awaiting final results and disposition.   [MV]   2355 ECG 12 Lead ED Triage Standing Order; Chest Pain  Sinus rhythm with rate of 68.  Right bundle branch block.  Normal NC interval.  No acute ST elevation.  EKG interpreted by me [LD]      ED Course User Index  [LD] Pratik Shields MD  [MV] Stephon Reyes PA       Labs:    Lab Results (last 24 hours)       Procedure Component Value Units Date/Time    High Sensitivity Troponin T [[id number]]  (Normal) Collected: 03/26/24 2018    Specimen: Blood from Arm, Right Updated: 03/26/24 2055     HS Troponin T 6 ng/L     Narrative:      High Sensitive Troponin T Reference Range:  <14.0 ng/L- Negative Female for AMI  <22.0 ng/L- Negative Male for AMI  >=14 - Abnormal Female indicating possible myocardial injury.  >=22 - Abnormal Male indicating possible myocardial injury.   Clinicians would have to utilize clinical acumen, EKG, Troponin, and serial changes to determine if it is an Acute Myocardial Infarction or myocardial injury due to an underlying chronic condition.         CBC & Differential [[id number]]  (Normal) Collected: 03/26/24 2018    Specimen: Blood from Arm, Right Updated: 03/26/24 2032    Narrative:      The following orders were created for panel  order CBC & Differential.  Procedure                               Abnormality         Status                     ---------                               -----------         ------                     CBC Auto Differential[[id number]]        Normal              Final result                 Please view results for these tests on the individual orders.    Comprehensive Metabolic Panel [[id number]]  (Abnormal) Collected: 03/26/24 2018    Specimen: Blood from Arm, Right Updated: 03/26/24 2055     Glucose 103 mg/dL      BUN 12 mg/dL      Creatinine 1.06 mg/dL      Sodium 140 mmol/L      Potassium 4.1 mmol/L      Chloride 105 mmol/L      CO2 24.7 mmol/L      Calcium 9.1 mg/dL      Total Protein 6.4 g/dL      Albumin 4.2 g/dL      ALT (SGPT) 17 U/L      AST (SGOT) 18 U/L      Alkaline Phosphatase 84 U/L      Total Bilirubin 0.8 mg/dL      Globulin 2.2 gm/dL      A/G Ratio 1.9 g/dL      BUN/Creatinine Ratio 11.3     Anion Gap 10.3 mmol/L      eGFR 75.0 mL/min/1.73     Narrative:      GFR Normal >60  Chronic Kidney Disease <60  Kidney Failure <15    The GFR formula is only valid for adults with stable renal function between ages 18 and 70.    Lipase [[id number]]  (Normal) Collected: 03/26/24 2018    Specimen: Blood from Arm, Right Updated: 03/26/24 2055     Lipase 51 U/L     BNP [[id number]]  (Normal) Collected: 03/26/24 2018    Specimen: Blood from Arm, Right Updated: 03/26/24 2052     proBNP <36.0 pg/mL     Narrative:      This assay is used as an aid in the diagnosis of individuals suspected of having heart failure. It can be used as an aid in the diagnosis of acute decompensated heart failure (ADHF) in patients presenting with signs and symptoms of ADHF to the emergency department (ED). In addition, NT-proBNP of <300 pg/mL indicates ADHF is not likely.    Age Range Result Interpretation  NT-proBNP Concentration (pg/mL:      <50             Positive            >450                   Gray                 300-450                     Negative             <300    50-75           Positive            >900                  Gray                300-900                  Negative            <300      >75             Positive            >1800                  Gray                300-1800                  Negative            <300    Magnesium [[id number]]  (Normal) Collected: 03/26/24 2018    Specimen: Blood from Arm, Right Updated: 03/26/24 2055     Magnesium 2.2 mg/dL     CBC Auto Differential [[id number]]  (Normal) Collected: 03/26/24 2018    Specimen: Blood from Arm, Right Updated: 03/26/24 2032     WBC 6.51 10*3/mm3      RBC 4.58 10*6/mm3      Hemoglobin 13.9 g/dL      Hematocrit 40.5 %      MCV 88.4 fL      MCH 30.3 pg      MCHC 34.3 g/dL      RDW 12.7 %      RDW-SD 41.3 fl      MPV 10.7 fL      Platelets 180 10*3/mm3      Neutrophil % 50.1 %      Lymphocyte % 35.5 %      Monocyte % 10.6 %      Eosinophil % 2.5 %      Basophil % 1.1 %      Immature Grans % 0.2 %      Neutrophils, Absolute 3.27 10*3/mm3      Lymphocytes, Absolute 2.31 10*3/mm3      Monocytes, Absolute 0.69 10*3/mm3      Eosinophils, Absolute 0.16 10*3/mm3      Basophils, Absolute 0.07 10*3/mm3      Immature Grans, Absolute 0.01 10*3/mm3      nRBC 0.0 /100 WBC     High Sensitivity Troponin T 2Hr [[id number]]  (Normal) Collected: 03/26/24 2359    Specimen: Blood Updated: 03/27/24 0023     HS Troponin T 7 ng/L      Troponin T Delta 1 ng/L     Narrative:      High Sensitive Troponin T Reference Range:  <14.0 ng/L- Negative Female for AMI  <22.0 ng/L- Negative Male for AMI  >=14 - Abnormal Female indicating possible myocardial injury.  >=22 - Abnormal Male indicating possible myocardial injury.   Clinicians would have to utilize clinical acumen, EKG, Troponin, and serial changes to determine if it is an Acute Myocardial Infarction or myocardial injury due to an underlying chronic condition.                  Imaging:    XR Chest 1 View    Result Date: 3/26/2024  XR CHEST 1 VW-  Date of  Exam: 3/26/2024 8:29 PM  Indication: Chest Pain Triage Protocol  Comparison: August 22, 2022  Findings: There is density at the left base which could reflect some atelectasis or scarring. The right lung is clear. The heart is not enlarged. There is slight elevation left hemidiaphragm which has been suggested.      Impression: 1.  Left basilar atelectasis or scarring suggested. There is slight elevation left hemidiaphragm which has been suggested.   Electronically Signed By-Danny Colmenares MD On:3/26/2024 8:49 PM         Differential Diagnosis and Discussion:      Chest Pain:  Based on the patient's signs and symptoms, I considered aortic dissection, myocardial infaction, pulmonary embolism, cardiac tamponade, pericarditis, pneumothorax, musculoskeletal chest pain and other differential diagnosis as an etiology of the patient's chest pain.     All labs were reviewed and interpreted by me.  All X-rays impressions were independently interpreted by me.  EKG was interpreted by me.    MDM  Number of Diagnoses or Management Options  Chest pain, unspecified type  Diagnosis management comments: Patient is afebrile nontoxic-appearing.  Vital signs are stable.  EKG shows sinus rhythm no acute ST elevation.  Initial troponin 6.  Repeat not elevated.  Patient had a heart cath a year ago did not show any significant occlusions.  Echo from few days ago did not show any concerning findings.  Pain has since resolved.  Discussed this with patient.  Recommend close follow-up with his cardiologist.  Discussed return precautions, discharge instructions and answered all his questions.       Amount and/or Complexity of Data Reviewed  Clinical lab tests: reviewed  Tests in the radiology section of CPT®: reviewed  Review and summarize past medical records: yes  Independent visualization of images, tracings, or specimens: yes    Risk of Complications, Morbidity, and/or Mortality  Presenting problems: moderate  Management options: moderate                  Patient Care Considerations:    CT CHEST: I considered ordering a CT scan of the chest, however no respiratory symptoms      Consultants/Shared Management Plan:    None    Social Determinants of Health:    Patient is independent, reliable, and has access to care.       Disposition and Care Coordination:    Discharged: I considered escalation of care by admitting this patient to the hospital, however patient has had recent echo and heart cath that did not show acute findings    I have explained the patient´s condition, diagnoses and treatment plan based on the information available to me at this time. I have answered questions and addressed any concerns. The patient has a good  understanding of the patient´s diagnosis, condition, and treatment plan as can be expected at this point. The vital signs have been stable. The patient´s condition is stable and appropriate for discharge from the emergency department.      The patient will pursue further outpatient evaluation with the primary care physician or other designated or consulting physician as outlined in the discharge instructions. They are agreeable to this plan of care and follow-up instructions have been explained in detail. The patient has received these instructions in written format and has expressed an understanding of the discharge instructions. The patient is aware that any significant change in condition or worsening of symptoms should prompt an immediate return to this or the closest emergency department or call to 911.  I have explained discharge medications and the need for follow up with the patient/caretakers. This was also printed in the discharge instructions. Patient was discharged with the following medications and follow up:      Medication List      No changes were made to your prescriptions during this visit.      Marshal Light MD  [address] [facility] [address]  [facility] [address]  [phone]    In 2 days          Final diagnoses:   Chest pain, unspecified type        ED Disposition       ED Disposition   Discharge    Condition   Stable    Comment   --               This medical record created using voice recognition software.             Pratik Shields MD  03/27/24 2460

## 2024-03-27 NOTE — TELEPHONE ENCOUNTER
03/27/2024    Called and left a voicemail for patient to return call to schedule this appointment.     Thanks   Toni

## 2024-03-27 NOTE — TELEPHONE ENCOUNTER
Caller: Kymberly Graham    Relationship to patient: Emergency Contact    Best call back number:     Chief complaint: RT FOOT    Type of visit: FUP       If rescheduling, when is the original appointment: 3/27/24     Additional notes:PATIENT HAD MRI DONE AT [facility] AND WAS HOSPITALIZED.  PLEASE CALL HIM BACK TO SCHEDULE FUP TO GO OVER MRI ON DISC

## 2024-03-27 NOTE — TELEPHONE ENCOUNTER
Have tried to call patient but keep getting voicemail.  Looks like he was just in the emergency room last night.  His echocardiogram was normal we will try and work on maybe getting him into the office to reevaluate his chest pain overall workup in the ER was fairly bland with normal EKG and normal troponin does have known small vessel disease which we will try to medically manage.

## 2024-04-01 ENCOUNTER — PATIENT OUTREACH (OUTPATIENT)
Dept: CASE MANAGEMENT | Facility: OTHER | Age: 74
End: 2024-04-01
Payer: MEDICARE

## 2024-04-01 NOTE — OUTREACH NOTE
AMBULATORY CASE MANAGEMENT NOTE    Name and Relationship of Patient/Support Person: Karan Graham R - Self    Karan was identified on [facility] as presenting to the ER for chest pain.  Cardiology has actually been attempting to reach out to patient to schedule a follow up.  Kymberly/Karan report that his phone is messed up and have not been getting reports.  They are to call today to get a follow up scheduled.      Catherine BOUCHER  Ambulatory Case Management    4/1/2024, 11:09 EDT

## 2024-04-03 LAB
QT INTERVAL: 427 MS
QTC INTERVAL: 455 MS

## 2024-04-08 ENCOUNTER — OFFICE VISIT (OUTPATIENT)
Dept: ORTHOPEDIC SURGERY | Facility: CLINIC | Age: 74
End: 2024-04-08
Payer: MEDICARE

## 2024-04-08 ENCOUNTER — OFFICE VISIT (OUTPATIENT)
Dept: CARDIOLOGY | Facility: CLINIC | Age: 74
End: 2024-04-08
Payer: MEDICARE

## 2024-04-08 VITALS — WEIGHT: 225.8 LBS | TEMPERATURE: 96.9 F | BODY MASS INDEX: 32.33 KG/M2 | HEIGHT: 70 IN

## 2024-04-08 VITALS
SYSTOLIC BLOOD PRESSURE: 125 MMHG | DIASTOLIC BLOOD PRESSURE: 80 MMHG | HEIGHT: 70 IN | WEIGHT: 226 LBS | HEART RATE: 71 BPM | BODY MASS INDEX: 32.35 KG/M2

## 2024-04-08 DIAGNOSIS — M19.072 ARTHRITIS OF BOTH ANKLES: ICD-10-CM

## 2024-04-08 DIAGNOSIS — M19.079 ARTHRITIS OF FOOT: Primary | ICD-10-CM

## 2024-04-08 DIAGNOSIS — M19.071 ARTHRITIS OF BOTH ANKLES: ICD-10-CM

## 2024-04-08 DIAGNOSIS — M19.072 ARTHRITIS OF LEFT SUBTALAR JOINT: ICD-10-CM

## 2024-04-08 DIAGNOSIS — I25.118 CORONARY ARTERY DISEASE OF NATIVE ARTERY OF NATIVE HEART WITH STABLE ANGINA PECTORIS: Primary | ICD-10-CM

## 2024-04-08 DIAGNOSIS — M76.829 POSTERIOR TIBIAL TENDON DYSFUNCTION: ICD-10-CM

## 2024-04-08 DIAGNOSIS — M19.071 ARTHRITIS OF RIGHT SUBTALAR JOINT: ICD-10-CM

## 2024-04-08 DIAGNOSIS — I10 ESSENTIAL HYPERTENSION: ICD-10-CM

## 2024-04-08 PROCEDURE — 3074F SYST BP LT 130 MM HG: CPT | Performed by: NURSE PRACTITIONER

## 2024-04-08 PROCEDURE — 93000 ELECTROCARDIOGRAM COMPLETE: CPT | Performed by: NURSE PRACTITIONER

## 2024-04-08 PROCEDURE — 1159F MED LIST DOCD IN RCRD: CPT | Performed by: NURSE PRACTITIONER

## 2024-04-08 PROCEDURE — 1160F RVW MEDS BY RX/DR IN RCRD: CPT | Performed by: NURSE PRACTITIONER

## 2024-04-08 PROCEDURE — 99214 OFFICE O/P EST MOD 30 MIN: CPT | Performed by: NURSE PRACTITIONER

## 2024-04-08 PROCEDURE — 3079F DIAST BP 80-89 MM HG: CPT | Performed by: NURSE PRACTITIONER

## 2024-04-08 RX ORDER — AMLODIPINE BESYLATE 2.5 MG/1
2.5 TABLET ORAL DAILY
Qty: 30 TABLET | Refills: 11 | Status: SHIPPED | OUTPATIENT
Start: 2024-04-08

## 2024-04-08 NOTE — PROGRESS NOTES
[facility] Follow Up Office Note     Encounter Date:24  Patient:Karan Graham  :1950  MRN:[id number]      Chief Complaint:   Chief Complaint   Patient presents with    Follow-up         History of Presenting Illness:        Karan Grhaam is a 73 y.o. male who is here for follow-up.  He is a patient of Dr Pedroza.    Patient has past medical history significant for coronary artery disease status post PCI, mixed hyperlipidemia, REGULO not on CPAP, GERD, cavernous sinus tumor.    Patient was seen by Dr. Pedroza in March at which time he had a little more swelling and a repeat echo was ordered.  This showed normal LVEF with no significant valvular heart disease.    Patient was actually evaluated for chest pain in the ER on 3/26/2024.  Had negative troponin x 2 and stable EKG although he has baseline right bundle branch block.    Today patient reports he has had another episode of chest pain. He does wonder if it could be related to food/ eating although he has not had significant diet changes. He denies increased dyspnea.      Review of Systems:  Review of Systems   Cardiovascular:  Positive for chest pain. Negative for dyspnea on exertion, leg swelling, orthopnea and palpitations.   Respiratory:  Negative for shortness of breath.        Current Outpatient Medications on File Prior to Visit   Medication Sig Dispense Refill    albuterol sulfate  (90 Base) MCG/ACT inhaler INHALE 2 PUFFS EVERY 6 HOURS AS NEEDED FOR SHORTNESS OF BREATH      aspirin 81 MG chewable tablet Chew 1 tablet Daily.      atorvastatin (LIPITOR) 80 MG tablet TAKE 1 TABLET BY MOUTH EVERY NIGHT 90 tablet 1    celecoxib (CeleBREX) 200 MG capsule Take 1 capsule by mouth 2 (Two) Times a Day As Needed for Mild Pain or Moderate Pain (Take as directed with food.). 60 capsule 2    clindamycin (CLEOCIN T) 1 % lotion APPLY A THIN LAYER TO THE AFFECTED AREA ON THE LEFT ARM TWICE DAILY DURING FLARES AND ONCE DAILY FOR MAINTENANCE       clopidogrel (PLAVIX) 75 MG tablet TAKE 1 TABLET BY MOUTH DAILY 90 tablet 1    guaiFENesin (MUCINEX PO) Take  by mouth As Needed.      Ibuprofen 3 %, Gabapentin 10 %, Baclofen 2 %, lidocaine 4 %, Ketamine HCl 4 % Apply 1-2 g topically to the appropriate area as directed 3 (Three) to 4 (Four) times daily. 90 g 1    ketoconazole (NIZORAL) 2 % cream Apply  topically to the appropriate area as directed.      Loratadine 10 MG capsule Take  by mouth.      metoprolol succinate XL (TOPROL-XL) 25 MG 24 hr tablet Take 1 tablet by mouth Daily. 90 tablet 3    nitroglycerin (NITROSTAT) 0.4 MG SL tablet Place 1 tablet under the tongue Every 5 (Five) Minutes As Needed for Chest Pain. Take no more than 3 doses in 15 minutes. 30 tablet 11    pantoprazole (PROTONIX) 40 MG EC tablet Take 1 tablet by mouth Every Night for 180 days. 90 tablet 1    tamsulosin (FLOMAX) 0.4 MG capsule 24 hr capsule Take 1 capsule by mouth 2 (Two) Times a Day.      triamcinolone (KENALOG) 0.1 % ointment Apply 1 application topically to the appropriate area as directed 2 (Two) Times a Day. 30 g 2    [DISCONTINUED] diazePAM (VALIUM) 5 MG tablet       [DISCONTINUED] methocarbamol (ROBAXIN) 500 MG tablet Take 1 tablet by mouth As Needed.      [DISCONTINUED] ranolazine (Ranexa) 500 MG 12 hr tablet Take 1 tablet by mouth 2 (Two) Times a Day. 180 tablet 3     No current facility-administered medications on file prior to visit.       Allergies   Allergen Reactions    Codeine Diarrhea and Nausea And Vomiting    Ibuprofen Nausea And Vomiting       Past Medical History:   Diagnosis Date    Arthritis of back     Years ago    Asthma     CAD (coronary artery disease)     hx stents june 2020    Callus     Clotting disorder     Blood thinners    GERD (gastroesophageal reflux disease) Many years ago    Hypertension     Knee swelling Years ago    Low back strain     Lumbosacral disc disease     Other allergic rhinitis 11/03/2022    Pancreas (digestive gland) works poorly      Rotator cuff syndrome     Skin cancer     Sleep apnea        Past Surgical History:   Procedure Laterality Date    BACK SURGERY      CARDIAC CATHETERIZATION  06/2020    CARDIAC CATHETERIZATION N/A 02/21/2023    Procedure: Left Heart Cath;  Surgeon: Eduard Pedroza MD;  Location:  [facility];  Service: Cardiovascular;  Laterality: N/A;    CARDIAC CATHETERIZATION N/A 02/21/2023    Procedure: Coronary angiography;  Surgeon: Eduard Pedroza MD;  Location:  [facility];  Service: Cardiovascular;  Laterality: N/A;    CHOLECYSTECTOMY      COLONOSCOPY  2020    NORMAL    COLONOSCOPY  2023    POLYPS    CORONARY ANGIOPLASTY WITH STENT PLACEMENT      TWICE    FRACTURE SURGERY  Various    HAND SURGERY      Years ago    HEMORRHOIDECTOMY      PANCREAS SURGERY      STENTING    SHOULDER SURGERY      SINUS SURGERY  2022    Deviated septum    TOE SURGERY Right     big toe    TONSILLECTOMY      TUMOR REMOVAL      Roof Of Mouth       Social History     Socioeconomic History    Marital status:    Tobacco Use    Smoking status: Never     Passive exposure: Past    Smokeless tobacco: Never   Vaping Use    Vaping status: Never Used   Substance and Sexual Activity    Alcohol use: Yes     Comment: Don’t drink weekly    Drug use: No    Sexual activity: Yes     Partners: Female     Birth control/protection: None       Family History   Problem Relation Age of Onset    Hypertension Mother     Leukemia Mother     No Known Problems Father     Hypertension Sister     Heart disease Brother     Cancer Brother         Colon cancer    Cancer Brother         Prostate    Cancer Brother         Lung cancer    Cancer Brother         Lung cancer    No Known Problems Maternal Aunt     No Known Problems Maternal Uncle     No Known Problems Paternal Aunt     No Known Problems Paternal Uncle     No Known Problems Maternal Grandmother     No Known Problems Maternal Grandfather     No Known Problems Paternal  "Grandmother     No Known Problems Paternal Grandfather     Cancer Other     Anesthesia problems Neg Hx     Broken bones Neg Hx     Clotting disorder Neg Hx     Collagen disease Neg Hx     Diabetes Neg Hx     Dislocations Neg Hx     Osteoporosis Neg Hx     Rheumatologic disease Neg Hx     Scoliosis Neg Hx     Severe sprains Neg Hx        The following portions of the patient's history were reviewed and updated as appropriate: allergies, current medications, past family history, past medical history, past social history, past surgical history and problem list.       Objective:       Vitals:    04/08/24 1423   BP: 125/80   Pulse: 71   Weight: 103 kg (226 lb)   Height: 177.8 cm (70\")         Physical Exam:  Constitutional: Well appearing, well developed, no acute distress   HENT: Oropharynx clear and membrane moist  Eyes: Normal conjunctiva, no sclera icterus  Neck: Supple, no carotid bruit bilaterally  Cardiovascular: Regular rate and rhythm, No Murmur, No bilateral lower extremity edema  Pulmonary: Normal respiratory effort, normal lung sounds, no wheezing  Neurological: Alert and orient x 3  Skin: Warm, dry, no ecchymosis, no rash  Psych: Appropriate mood and affect. Normal judgment and insight         Lab Results   Component Value Date     03/26/2024     05/08/2023    K 4.1 03/26/2024    K 4.2 05/08/2023     03/26/2024     05/08/2023    CO2 24.7 03/26/2024    CO2 24.0 05/08/2023    BUN 12 03/26/2024    BUN 12 05/08/2023    CREATININE 1.06 03/26/2024    CREATININE 0.84 05/08/2023    EGFRIFNONA 99 09/09/2020    EGFRIFNONA 95 09/08/2020    EGFRIFAFRI >60 04/04/2022    GLUCOSE 103 (H) 03/26/2024    GLUCOSE 106 (H) 05/08/2023    CALCIUM 9.1 03/26/2024    CALCIUM 9.1 05/08/2023    ALBUMIN 4.2 03/26/2024    ALBUMIN 4.9 05/08/2023    BILITOT 0.8 03/26/2024    BILITOT 0.9 05/08/2023    AST 18 03/26/2024    AST 29 05/08/2023    ALT 17 03/26/2024    ALT 26 05/08/2023     Lab Results   Component Value " Date    WBC 6.51 03/26/2024    WBC 5.75 05/08/2023    HGB 13.9 03/26/2024    HGB 14.2 05/08/2023    HCT 40.5 03/26/2024    HCT 40.7 05/08/2023    MCV 88.4 03/26/2024    MCV 89.6 05/08/2023     03/26/2024     05/08/2023     Lab Results   Component Value Date    CHOL 92 05/08/2023    CHOL 95 08/12/2022    TRIG 105 05/08/2023    TRIG 111 08/12/2022    HDL 35 (L) 05/08/2023    HDL 31 (L) 08/12/2022    LDL 37 05/08/2023    LDL 43 08/12/2022     Lab Results   Component Value Date    PROBNP <36.0 03/26/2024    PROBNP 28.7 02/08/2023    BNP <11.1 08/09/2019     Lab Results   Component Value Date    CKTOTAL 53 03/22/2017    CKMB 1.32 03/22/2017    TROPONINI <0.012 08/09/2019    TROPONINT 7 03/26/2024     Lab Results   Component Value Date    TSH 0.853 05/08/2023    TSH 1.190 08/12/2022           ECG 12 Lead    Date/Time: 4/8/2024 3:05 PM  Performed by: Aiyana Metcalf APRN    Authorized by: Aiyana Metcalf APRN  Comparison: compared with previous ECG from 3/26/2024  Similar to previous ECG  Rhythm: sinus rhythm  Rate: normal  Conduction: right bundle branch block and left anterior fascicular block        Cardiac catheterization 2/23/2023:  No significant coronary artery disease with patent stents in the proximal to mid LAD as well as distal RCA and the PDA with no significant in-stent restenosis.  Circumflex contains luminal irregularities.  There is some small vessel disease within the very distal circumflex but small in caliber size and distribution.  Normal LVEDP of 6 mmHg     Cardiac stress test 9/22/2022:  Myocardial perfusion imaging indicates a normal myocardial perfusion study with no evidence of ischemia.  Left ventricular ejection fraction is hyperdynamic (Calculated EF > 70%). .  Impressions are consistent with a low risk study.     Echocardiogram 9/22/2022 images reviewed by myself:  Calculated left ventricular EF = 68.4%. Left ventricular systolic function is normal.All left ventricular  wall segments contract normally.Normal Global longitudinal LV strain (GLS) = -23.2%.  Left ventricular diastolic function was normal.     Stent card 8/13/2020:  Xience RALEIGH 2.5 x 23 mm placed to distal RCA     Stent card 6/26/2020:  Promus RALEIGH placed LAD Dr. Cox at [facility]     Echocardiogram 6/22/2018 [facility]:  Overall left ventricular function is normal. The estimated ejection fraction is 60-65%.   Trace-to-mild mitral regurgitation is present.   Patent foramen ovale.      Carotid duplex 6/21/2018 [facility]:  Bilateral internal carotid arteries with plaque but <50% stenosis.  Bilateral vertebral arteries with antegrade flow.  No prior examination for comparison.      Assessment:          Diagnosis Plan   1. Coronary artery disease (stents)  ECG 12 Lead      2. Essential hypertension               Plan:       Coronary artery disease -history of PCI.  Cath 3/2022 with patent stents, notes small vessel disease.  Recent chest pain with normal troponin, EKG has chronic right bundle branch block.  Previously did not tolerate Imdur for headaches, will start low-dose amlodipine and monitor symptoms.  He just had a recent echo that shows normal EF  Essential hypertension  Mixed hyperlipidemia -well-controlled on labs 5/2023.  Continue atorvastatin      Orders Placed This Encounter   Procedures    ECG 12 Lead     This order was created via procedure documentation     Order Specific Question:   Release to patient     Answer:   Routine Release [[id number]]            AMINAH Kimble  [facility]  04/08/24  15:07 EDT

## 2024-04-08 NOTE — PROGRESS NOTES
Ankle Follow Up      Patient: Karan Graham    YOB: 1950 73 y.o. male    Chief Complaints: Ankles hurt off and on     History of Present Illness:Patient was seen on 3/4/2024 reporting onset about 6 months prior of intermittent moderate to severe pain in the inferolateral aspect of the left ankle more so than inframedially.  He did not have any specific acute injury or injury around the time of onset of symptoms but reported that he has injured it in the past back in 2003.  Pain was mainly when he twisted getting in and out of his truck but also with walking.  He had seen Dr. An on 1/8/2024 at which time symptoms were greater on the left than the right and was sent for MRI.  On 3/4/2024 he reported persistent symptoms as well as worsening symptoms on the right inferolateral more so the medial hindfoot aching and burning in nature.  Pain was rated 5 out of 10.  He reported that had had multiple cardiac stents and was on Plavix.    We fitted with ASO braces for bilateral stability during ambulation and did not recommend any surgical treatment.  He was prescribed compounding cream to apply several times daily.  Get some trouble with Voltaren cream in the past affecting his blood thinner so is instructed to watch this carefully.  He was sent for MRI of the right hindfoot we discussed that he may end up needing bilateral radiographic guided injections.    Patient is seen back today stating that he is doing okay does get an intermittent pain in the inferolateral aspect of both hind feet.  He reports that the compounding cream does help and he also gets some gel inserts at [facility] from Dr. Willis's which seemed to help quite a bit.  Current symptoms are rated 1 out of 10  HPI    ROS: ankle pain  Past Medical History:   Diagnosis Date    Arthritis of back     Years ago    Asthma     CAD (coronary artery disease)     hx stents june 2020    Callus     Clotting disorder     Blood thinners    GERD  "(gastroesophageal reflux disease) Many years ago    Hypertension     Knee swelling Years ago    Low back strain     Lumbosacral disc disease     Other allergic rhinitis 11/03/2022    Pancreas (digestive gland) works poorly     Rotator cuff syndrome     Skin cancer     Sleep apnea        Physical Exam:   Vitals:    04/08/24 1344   Temp: 96.9 °F (36.1 °C)   Weight: 102 kg (225 lb 12.8 oz)   Height: 177.8 cm (70\")   PainSc:   1     Well developed with normal mood.  On exam he has moderate pes planus and good inversion strength.  He has stable anterior drawer and mild discomfort anterolaterally but no exacerbation of pain with range of motion of the right ankle.      Radiology: MRI films and report of the right hindfoot dated 3/26/2024 reviewed.  The posterior tib FDL and FHL remain intact but do show some tenosynovitis    There are degenerative signal noted in the ATFL and CFL and partial-thickness tear of the PT FL.  Degenerative changes in the superficial and deep deltoid    Mild thinning of the cartilage of the distal tibial plafond with marginal spurring at the tibiotalar joint.  Mild cartilage thinning the dome of the talus and small tibiotalar joint effusion    There is reactive marrow edema in the posterior inferior body of the talus adjacent to the posterior subtalar joint and moderate talonavicular joint effusion.  Small posterior subtalar joint effusion.  Nonspecific edema in the fat of the sinus tarsi    MRI films and report of the left hindfoot dated 1/18/2024 reviewed which showed probable remote nonunited fracture of the anterior process calcaneus.  There was moderate subtalar joint narrowing and moderate calcaneocuboid joint narrowing and mild to moderate talonavicular joint narrowing.  There is mild to moderate tarsometatarsal joint space narrowing most pronounced at the first and second TMT joint and mild tibiotalar joint space narrowing.     There is subchondral edema in the lateral subtalar " articulation but no acute fracture.  There is moderate hindfoot valgus alignment     There was thickening of the medial band of the plantar fascia.  Flexor and extensor tendons appeared normal as did the peroneal tendons and medial and lateral ligamentous complex.  There was lack of normal fatty signal in the sinus tarsi.      Assessment/Plan:  1.  Left inferolateral hindfoot pain without evidence of peroneal tendon injury  2.  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  Left moderate subtalar joint narrowing with hindfoot valgus and lack of sinus tarsi normal fat signal intensity with subchondral edema in the lateral subtalar articulation  4.  Left moderate talonavicular and calcaneocuboid joint space narrowing  5.  Left midfoot moderate tarsometatarsal joint space narrowing most pronounced at first and second TMT joint  6.  Left tibiotalar joint space narrowing  7.  Right inferolateral hindfoot pain with without peroneal tendon tear a with some reactive marrow edema in the posterior inferior body of the talus adjacent to the posterior subtalar joint and sinus tarsi fat edema.  8.  Right inferomedial hindfoot pain with with flexor tenosynovitis without tear of the posterior tib, FT H or FHL  9.  Degenerative signal of the right ATFL CFL and partial thickness tear of the PT FL with degenerative changes of the superficial and deep deltoid  10.  Mild thinning of the right distal tibial plafond with marginal spurring and mild thinning of the dome of the talus with small tibiotalar joint effusion.  11.  Moderate right talonavicular joint effusion and posterior subtalar joint effusion    We had a long discussion regarding treatment options and nothing that I would recommend from a surgical standpoint most of his symptoms seem to be coming from some inferolateral impingement bilaterally with pes planus.  We discussed injections which she wanted to hold off on at this point which would be in the sinus  tarsi/subtalar joint.    He will continue with prescribed compounding cream.  We discussed custom or more robust inserts and needs to stick with what he has as this seems to be helping    If he has persistent symptoms we will consider injection possible therapy and/or possible robust or custom orthotics    We had a pleasant visit and we will see him back as needed

## 2024-04-22 ENCOUNTER — OFFICE (OUTPATIENT)
Dept: URBAN - METROPOLITAN AREA CLINIC 66 | Facility: CLINIC | Age: 74
End: 2024-04-22

## 2024-04-22 VITALS
HEIGHT: 70 IN | HEART RATE: 70 BPM | WEIGHT: 226 LBS | SYSTOLIC BLOOD PRESSURE: 124 MMHG | DIASTOLIC BLOOD PRESSURE: 68 MMHG

## 2024-04-22 DIAGNOSIS — R10.84 GENERALIZED ABDOMINAL PAIN: ICD-10-CM

## 2024-04-22 DIAGNOSIS — R11.0 NAUSEA: ICD-10-CM

## 2024-04-22 DIAGNOSIS — Z86.010 PERSONAL HISTORY OF COLONIC POLYPS: ICD-10-CM

## 2024-04-22 DIAGNOSIS — K59.00 CONSTIPATION, UNSPECIFIED: ICD-10-CM

## 2024-04-22 DIAGNOSIS — Z80.0 FAMILY HISTORY OF MALIGNANT NEOPLASM OF DIGESTIVE ORGANS: ICD-10-CM

## 2024-04-22 PROBLEM — K57.90 DIVERTICULOSIS: Status: ACTIVE | Noted: 2018-03-12

## 2024-04-22 PROCEDURE — 99214 OFFICE O/P EST MOD 30 MIN: CPT | Performed by: NURSE PRACTITIONER

## 2024-04-26 ENCOUNTER — TRANSCRIBE ORDERS (OUTPATIENT)
Dept: LAB | Facility: HOSPITAL | Age: 74
End: 2024-04-26
Payer: MEDICARE

## 2024-04-26 ENCOUNTER — LAB (OUTPATIENT)
Dept: LAB | Facility: HOSPITAL | Age: 74
End: 2024-04-26
Payer: MEDICARE

## 2024-04-26 DIAGNOSIS — Z80.0 FAMILY HISTORY OF MALIGNANT NEOPLASM OF GASTROINTESTINAL TRACT: ICD-10-CM

## 2024-04-26 DIAGNOSIS — R11.0 NAUSEA: ICD-10-CM

## 2024-04-26 DIAGNOSIS — R10.84 ABDOMINAL PAIN, GENERALIZED: ICD-10-CM

## 2024-04-26 DIAGNOSIS — K57.90 DIVERTICULOSIS: ICD-10-CM

## 2024-04-26 DIAGNOSIS — K59.00 CONSTIPATION, UNSPECIFIED CONSTIPATION TYPE: ICD-10-CM

## 2024-04-26 DIAGNOSIS — Z86.010 HX OF COLONIC POLYPS: ICD-10-CM

## 2024-04-26 DIAGNOSIS — K59.00 CONSTIPATION, UNSPECIFIED CONSTIPATION TYPE: Primary | ICD-10-CM

## 2024-04-26 LAB
ALBUMIN SERPL-MCNC: 4.6 G/DL (ref 3.5–5.2)
ALBUMIN/GLOB SERPL: 2.3 G/DL
ALP SERPL-CCNC: 84 U/L (ref 39–117)
ALT SERPL W P-5'-P-CCNC: 21 U/L (ref 1–41)
AMYLASE SERPL-CCNC: 48 U/L (ref 28–100)
ANION GAP SERPL CALCULATED.3IONS-SCNC: 11 MMOL/L (ref 5–15)
AST SERPL-CCNC: 22 U/L (ref 1–40)
BASOPHILS # BLD AUTO: 0.05 10*3/MM3 (ref 0–0.2)
BASOPHILS NFR BLD AUTO: 0.8 % (ref 0–1.5)
BILIRUB SERPL-MCNC: 0.9 MG/DL (ref 0–1.2)
BUN SERPL-MCNC: 14 MG/DL (ref 8–23)
BUN/CREAT SERPL: 13.7 (ref 7–25)
CALCIUM SPEC-SCNC: 9.1 MG/DL (ref 8.6–10.5)
CHLORIDE SERPL-SCNC: 106 MMOL/L (ref 98–107)
CO2 SERPL-SCNC: 23 MMOL/L (ref 22–29)
CREAT SERPL-MCNC: 1.02 MG/DL (ref 0.76–1.27)
DEPRECATED RDW RBC AUTO: 41.5 FL (ref 37–54)
EGFRCR SERPLBLD CKD-EPI 2021: 78.6 ML/MIN/1.73
EOSINOPHIL # BLD AUTO: 0.2 10*3/MM3 (ref 0–0.4)
EOSINOPHIL NFR BLD AUTO: 3.3 % (ref 0.3–6.2)
ERYTHROCYTE [DISTWIDTH] IN BLOOD BY AUTOMATED COUNT: 12.6 % (ref 12.3–15.4)
GLOBULIN UR ELPH-MCNC: 2 GM/DL
GLUCOSE SERPL-MCNC: 95 MG/DL (ref 65–99)
HCT VFR BLD AUTO: 41.4 % (ref 37.5–51)
HGB BLD-MCNC: 14 G/DL (ref 13–17.7)
IMM GRANULOCYTES # BLD AUTO: 0.01 10*3/MM3 (ref 0–0.05)
IMM GRANULOCYTES NFR BLD AUTO: 0.2 % (ref 0–0.5)
LIPASE SERPL-CCNC: 36 U/L (ref 13–60)
LYMPHOCYTES # BLD AUTO: 1.94 10*3/MM3 (ref 0.7–3.1)
LYMPHOCYTES NFR BLD AUTO: 31.8 % (ref 19.6–45.3)
MCH RBC QN AUTO: 30.3 PG (ref 26.6–33)
MCHC RBC AUTO-ENTMCNC: 33.8 G/DL (ref 31.5–35.7)
MCV RBC AUTO: 89.6 FL (ref 79–97)
MONOCYTES # BLD AUTO: 0.7 10*3/MM3 (ref 0.1–0.9)
MONOCYTES NFR BLD AUTO: 11.5 % (ref 5–12)
NEUTROPHILS NFR BLD AUTO: 3.2 10*3/MM3 (ref 1.7–7)
NEUTROPHILS NFR BLD AUTO: 52.4 % (ref 42.7–76)
PLATELET # BLD AUTO: 158 10*3/MM3 (ref 140–450)
PMV BLD AUTO: 10.8 FL (ref 6–12)
POTASSIUM SERPL-SCNC: 4.3 MMOL/L (ref 3.5–5.2)
PROT SERPL-MCNC: 6.6 G/DL (ref 6–8.5)
RBC # BLD AUTO: 4.62 10*6/MM3 (ref 4.14–5.8)
SODIUM SERPL-SCNC: 140 MMOL/L (ref 136–145)
WBC NRBC COR # BLD AUTO: 6.1 10*3/MM3 (ref 3.4–10.8)

## 2024-04-26 PROCEDURE — 36415 COLL VENOUS BLD VENIPUNCTURE: CPT

## 2024-04-26 PROCEDURE — 82150 ASSAY OF AMYLASE: CPT

## 2024-04-26 PROCEDURE — 83690 ASSAY OF LIPASE: CPT

## 2024-04-26 PROCEDURE — 85025 COMPLETE CBC W/AUTO DIFF WBC: CPT

## 2024-04-26 PROCEDURE — 80053 COMPREHEN METABOLIC PANEL: CPT

## 2024-05-13 RX ORDER — CLOPIDOGREL BISULFATE 75 MG/1
75 TABLET ORAL DAILY
Qty: 90 TABLET | Refills: 1 | Status: SHIPPED | OUTPATIENT
Start: 2024-05-13

## 2024-05-15 ENCOUNTER — OFFICE VISIT (OUTPATIENT)
Dept: FAMILY MEDICINE CLINIC | Age: 74
End: 2024-05-15
Payer: MEDICARE

## 2024-05-15 ENCOUNTER — LAB (OUTPATIENT)
Dept: LAB | Facility: HOSPITAL | Age: 74
End: 2024-05-15
Payer: MEDICARE

## 2024-05-15 VITALS
SYSTOLIC BLOOD PRESSURE: 116 MMHG | HEART RATE: 66 BPM | WEIGHT: 223.8 LBS | RESPIRATION RATE: 18 BRPM | DIASTOLIC BLOOD PRESSURE: 64 MMHG | BODY MASS INDEX: 32.04 KG/M2 | HEIGHT: 70 IN | OXYGEN SATURATION: 97 %

## 2024-05-15 DIAGNOSIS — Z11.59 ENCOUNTER FOR SCREENING FOR OTHER VIRAL DISEASES: ICD-10-CM

## 2024-05-15 DIAGNOSIS — K21.9 GERD WITHOUT ESOPHAGITIS: ICD-10-CM

## 2024-05-15 DIAGNOSIS — E78.00 HIGH CHOLESTEROL: ICD-10-CM

## 2024-05-15 DIAGNOSIS — J45.20 MILD INTERMITTENT ASTHMA, UNCOMPLICATED: ICD-10-CM

## 2024-05-15 DIAGNOSIS — Z12.5 SCREENING FOR PROSTATE CANCER: ICD-10-CM

## 2024-05-15 DIAGNOSIS — I10 ESSENTIAL HYPERTENSION: Primary | ICD-10-CM

## 2024-05-15 PROBLEM — Z00.00 MEDICARE ANNUAL WELLNESS VISIT, SUBSEQUENT: Status: RESOLVED | Noted: 2023-11-13 | Resolved: 2024-05-15

## 2024-05-15 PROBLEM — M19.071 ARTHRITIS OF RIGHT SUBTALAR JOINT: Status: RESOLVED | Noted: 2024-03-04 | Resolved: 2024-05-15

## 2024-05-15 PROBLEM — M25.879 ANKLE IMPINGEMENT SYNDROME: Status: RESOLVED | Noted: 2024-03-04 | Resolved: 2024-05-15

## 2024-05-15 PROBLEM — K59.09 CHRONIC CONSTIPATION: Status: ACTIVE | Noted: 2024-05-15

## 2024-05-15 PROBLEM — R93.7 BONE MARROW EDEMA: Status: RESOLVED | Noted: 2024-03-04 | Resolved: 2024-05-15

## 2024-05-15 PROBLEM — M76.829 POSTERIOR TIBIAL TENDON DYSFUNCTION: Status: RESOLVED | Noted: 2024-03-04 | Resolved: 2024-05-15

## 2024-05-15 PROBLEM — M25.579 SINUS TARSITIS: Status: RESOLVED | Noted: 2024-03-04 | Resolved: 2024-05-15

## 2024-05-15 PROBLEM — M19.079 ARTHRITIS OF FOOT: Status: RESOLVED | Noted: 2024-03-04 | Resolved: 2024-05-15

## 2024-05-15 LAB
CHOLEST SERPL-MCNC: 97 MG/DL (ref 0–200)
HCV AB SER QL: NORMAL
HDLC SERPL-MCNC: 33 MG/DL (ref 40–60)
LDLC SERPL CALC-MCNC: 45 MG/DL (ref 0–100)
LDLC/HDLC SERPL: 1.34 {RATIO}
PSA SERPL-MCNC: 0.69 NG/ML (ref 0–4)
TRIGL SERPL-MCNC: 99 MG/DL (ref 0–150)
TSH SERPL DL<=0.05 MIU/L-ACNC: 1.3 UIU/ML (ref 0.27–4.2)
VLDLC SERPL-MCNC: 19 MG/DL (ref 5–40)

## 2024-05-15 PROCEDURE — G2211 COMPLEX E/M VISIT ADD ON: HCPCS | Performed by: FAMILY MEDICINE

## 2024-05-15 PROCEDURE — 99214 OFFICE O/P EST MOD 30 MIN: CPT | Performed by: FAMILY MEDICINE

## 2024-05-15 PROCEDURE — 84443 ASSAY THYROID STIM HORMONE: CPT | Performed by: FAMILY MEDICINE

## 2024-05-15 PROCEDURE — G0103 PSA SCREENING: HCPCS | Performed by: FAMILY MEDICINE

## 2024-05-15 PROCEDURE — 3074F SYST BP LT 130 MM HG: CPT | Performed by: FAMILY MEDICINE

## 2024-05-15 PROCEDURE — 86803 HEPATITIS C AB TEST: CPT | Performed by: FAMILY MEDICINE

## 2024-05-15 PROCEDURE — 1125F AMNT PAIN NOTED PAIN PRSNT: CPT | Performed by: FAMILY MEDICINE

## 2024-05-15 PROCEDURE — 3078F DIAST BP <80 MM HG: CPT | Performed by: FAMILY MEDICINE

## 2024-05-15 PROCEDURE — 1160F RVW MEDS BY RX/DR IN RCRD: CPT | Performed by: FAMILY MEDICINE

## 2024-05-15 PROCEDURE — 1159F MED LIST DOCD IN RCRD: CPT | Performed by: FAMILY MEDICINE

## 2024-05-15 PROCEDURE — 36415 COLL VENOUS BLD VENIPUNCTURE: CPT | Performed by: FAMILY MEDICINE

## 2024-05-15 PROCEDURE — 80061 LIPID PANEL: CPT | Performed by: FAMILY MEDICINE

## 2024-05-15 RX ORDER — PANTOPRAZOLE SODIUM 40 MG/1
40 TABLET, DELAYED RELEASE ORAL NIGHTLY
COMMUNITY
Start: 2024-04-29

## 2024-05-15 RX ORDER — DOXYCYCLINE HYCLATE 100 MG/1
1 CAPSULE ORAL EVERY 12 HOURS SCHEDULED
COMMUNITY
Start: 2024-04-26 | End: 2024-05-15

## 2024-05-15 NOTE — PROGRESS NOTES
Chief Complaint  Hypertension (6 month check up / med refill) and Tick Removal (Tick bites. )    Subjective          Karan Graham presents to [facility]  History of Present Illness  --TOLERATING BLOOD PRESSURE MEDICATION WITHOUT APPARENT SIDE EFFECTS  --LAST LIPIDS WERE OK, NO ISSUES WITH THE STATIN  --GERD IS WELL CONTROLLED WITH THE PPI  --BREATJHING IS STABLE ON CURRENT INHALED MEDS        Allergies   Allergen Reactions    Codeine Diarrhea and Nausea And Vomiting    Ibuprofen Nausea And Vomiting        Health Maintenance Due   Topic Date Due    ZOSTER VACCINE (1 of 2) Never done    RSV Vaccine - Adults (1 - 1-dose 60+ series) Never done    HEPATITIS C SCREENING  Never done    COVID-19 Vaccine (5 - 2023-24 season) 05/04/2024    LIPID PANEL  05/08/2024        Current Outpatient Medications on File Prior to Visit   Medication Sig    albuterol sulfate  (90 Base) MCG/ACT inhaler INHALE 2 PUFFS EVERY 6 HOURS AS NEEDED FOR SHORTNESS OF BREATH    amLODIPine (NORVASC) 2.5 MG tablet Take 1 tablet by mouth Daily.    aspirin 81 MG chewable tablet Chew 1 tablet Daily.    atorvastatin (LIPITOR) 80 MG tablet TAKE 1 TABLET BY MOUTH EVERY NIGHT    celecoxib (CeleBREX) 200 MG capsule Take 1 capsule by mouth 2 (Two) Times a Day As Needed for Mild Pain or Moderate Pain (Take as directed with food.).    clindamycin (CLEOCIN T) 1 % lotion APPLY A THIN LAYER TO THE AFFECTED AREA ON THE LEFT ARM TWICE DAILY DURING FLARES AND ONCE DAILY FOR MAINTENANCE    clopidogrel (PLAVIX) 75 MG tablet TAKE 1 TABLET BY MOUTH DAILY    Ibuprofen 3 %, Gabapentin 10 %, Baclofen 2 %, lidocaine 4 %, Ketamine HCl 4 % Apply 1-2 g topically to the appropriate area as directed 3 (Three) to 4 (Four) times daily.    ketoconazole (NIZORAL) 2 % cream Apply  topically to the appropriate area as directed.    Loratadine 10 MG capsule Take  by mouth.    metoprolol succinate XL (TOPROL-XL) 25 MG 24 hr tablet Take 1 tablet by  "mouth Daily.    nitroglycerin (NITROSTAT) 0.4 MG SL tablet Place 1 tablet under the tongue Every 5 (Five) Minutes As Needed for Chest Pain. Take no more than 3 doses in 15 minutes.    pantoprazole (PROTONIX) 40 MG EC tablet Take 1 tablet by mouth Every Night.    tamsulosin (FLOMAX) 0.4 MG capsule 24 hr capsule Take 1 capsule by mouth 2 (Two) Times a Day.    triamcinolone (KENALOG) 0.1 % ointment Apply 1 application topically to the appropriate area as directed 2 (Two) Times a Day.    [DISCONTINUED] doxycycline (VIBRAMYCIN) 100 MG capsule Take 1 capsule by mouth Every 12 (Twelve) Hours.    [DISCONTINUED] guaiFENesin (MUCINEX PO) Take  by mouth As Needed.     No current facility-administered medications on file prior to visit.       Immunization History   Administered Date(s) Administered    COVID-19 (PFIZER) Purple Cap Monovalent 02/24/2021, 03/17/2021, 10/06/2021    COVID-19 F23 (MODERNA) 12YRS+ (SPIKEVAX) 01/04/2024    Pneumococcal Conjugate 13-Valent (PCV13) 08/08/2016    Pneumococcal Polysaccharide (PPSV23) 10/01/2018       Review of Systems   Constitutional:  Negative for activity change, appetite change, chills, fatigue and fever.   HENT:  Negative for congestion, ear pain, rhinorrhea and sore throat.    Respiratory:  Negative for cough and shortness of breath.    Cardiovascular:  Negative for chest pain, palpitations and leg swelling.   Gastrointestinal:  Negative for abdominal pain, constipation, diarrhea, nausea and vomiting.   Musculoskeletal:  Positive for arthralgias. Negative for myalgias.   Neurological:  Negative for headache.        Objective     /64 (BP Location: Left arm, Patient Position: Sitting, Cuff Size: Adult)   Pulse 66   Resp 18   Ht 177.8 cm (70\")   Wt 102 kg (223 lb 12.8 oz)   SpO2 97% Comment: room air  BMI 32.11 kg/m²       Physical Exam  Vitals and nursing note reviewed.   Constitutional:       General: He is not in acute distress.     Appearance: Normal appearance. "   Cardiovascular:      Rate and Rhythm: Normal rate and regular rhythm.      Heart sounds: Normal heart sounds. No murmur heard.  Pulmonary:      Effort: Pulmonary effort is normal.      Breath sounds: Normal breath sounds.   Abdominal:      Palpations: Abdomen is soft.      Tenderness: There is no abdominal tenderness.   Musculoskeletal:      Cervical back: Neck supple.      Right lower leg: No edema.      Left lower leg: No edema.   Lymphadenopathy:      Cervical: No cervical adenopathy.   Neurological:      General: No focal deficit present.      Mental Status: He is alert.      Cranial Nerves: No cranial nerve deficit.      Coordination: Coordination normal.      Gait: Gait normal.   Psychiatric:         Mood and Affect: Mood normal.         Behavior: Behavior normal.         Result Review :                             Assessment and Plan      Diagnoses and all orders for this visit:    1. Essential hypertension (Primary)  Assessment & Plan:  Hypertension is stable and controlled  Continue current treatment regimen.  Blood pressure will be reassessed in 6 months.    Orders:  -     TSH Rfx On Abnormal To Free T4    2. GERD without esophagitis  Assessment & Plan:  IMPROVED WITH CURRENT TREATMENT, CONTINUE SAME, WILL REEVALUATE AT NEXT VISIT       3. High cholesterol  Assessment & Plan:   Lipid abnormalities are stable    Plan:  Continue same medication/s without change.      Discussed medication dosage, use, side effects, and goals of treatment in detail.    Counseled patient on lifestyle modifications to help control hyperlipidemia.     Patient Treatment Goals:   LDL goal is less than 70    Followup in 6 months.    Orders:  -     Lipid Panel    4. Mild intermittent asthma, uncomplicated  Assessment & Plan:  Asthma is stable.  The patient is experiencing no daytime asthma symptoms and he is experiencing no nighttime asthma symptoms.    Plan: Continue same medication/s without change.    Discussed medication  dosage, use, side effects, and goals of treatment in detail.      Patient Treatment Goals: symptom prevention.    Followup in 6 months.              5. Screening for prostate cancer  -     PSA Screen    6. Encounter for screening for other viral diseases  -     Hepatitis C Antibody            Follow Up     Return in about 6 months (around 11/15/2024).    Patient was given instructions and counseling regarding his condition or for health maintenance advice. Please see specific information pulled into the AVS if appropriate.

## 2024-05-15 NOTE — ASSESSMENT & PLAN NOTE
Asthma is stable.  The patient is experiencing no daytime asthma symptoms and he is experiencing no nighttime asthma symptoms.    Plan: Continue same medication/s without change.    Discussed medication dosage, use, side effects, and goals of treatment in detail.      Patient Treatment Goals: symptom prevention.    Followup in 6 months.

## 2024-05-20 RX ORDER — ATORVASTATIN CALCIUM 80 MG/1
80 TABLET, FILM COATED ORAL NIGHTLY
Qty: 90 TABLET | Refills: 1 | Status: SHIPPED | OUTPATIENT
Start: 2024-05-20

## 2024-06-18 RX ORDER — METOPROLOL SUCCINATE 25 MG/1
25 TABLET, EXTENDED RELEASE ORAL DAILY
Qty: 90 TABLET | Refills: 3 | Status: SHIPPED | OUTPATIENT
Start: 2024-06-18

## 2024-07-09 ENCOUNTER — OFFICE VISIT (OUTPATIENT)
Dept: ORTHOPEDIC SURGERY | Facility: CLINIC | Age: 74
End: 2024-07-09
Payer: MEDICARE

## 2024-07-09 DIAGNOSIS — M46.1 SI (SACROILIAC) JOINT INFLAMMATION: Primary | ICD-10-CM

## 2024-07-09 DIAGNOSIS — M70.62 TROCHANTERIC BURSITIS OF LEFT HIP: ICD-10-CM

## 2024-07-09 DIAGNOSIS — M25.552 LEFT HIP PAIN: ICD-10-CM

## 2024-07-09 PROCEDURE — 1159F MED LIST DOCD IN RCRD: CPT | Performed by: ORTHOPAEDIC SURGERY

## 2024-07-09 PROCEDURE — 73502 X-RAY EXAM HIP UNI 2-3 VIEWS: CPT | Performed by: ORTHOPAEDIC SURGERY

## 2024-07-09 PROCEDURE — 1160F RVW MEDS BY RX/DR IN RCRD: CPT | Performed by: ORTHOPAEDIC SURGERY

## 2024-07-09 PROCEDURE — 99214 OFFICE O/P EST MOD 30 MIN: CPT | Performed by: ORTHOPAEDIC SURGERY

## 2024-07-09 RX ORDER — ALBUTEROL SULFATE 90 UG/1
AEROSOL, METERED RESPIRATORY (INHALATION)
OUTPATIENT
Start: 2024-07-09

## 2024-07-09 NOTE — PROGRESS NOTES
General Exam    Patient: Karan Graham    YOB: 1950    Medical Record Number: [id number]    Chief Complaints: Left hip/low back pain    History of Present Illness:     74 y.o. male patient who presents for evaluation left hip/low back pain.  Patient states she is feels that he has had issues since 2016 he had a bulldozer roll down a hill he jumped on the way and fell onto that side.  He states the pain is more in the low back left side.  Also some lateral hip pain can be difficult to lay on that side.  Activity worsens symptoms.    Patient states he does have a history of back issues and was told at some point he would need surgery for his back.    Denies any numbness or tingling.  Denies any fevers, cough or shortness of breath.    Allergies:   Allergies   Allergen Reactions    Codeine Diarrhea and Nausea And Vomiting    Ibuprofen Nausea And Vomiting       Home Medications:      Current Outpatient Medications:     albuterol sulfate  (90 Base) MCG/ACT inhaler, INHALE 2 PUFFS EVERY 6 HOURS AS NEEDED FOR SHORTNESS OF BREATH, Disp: , Rfl:     amLODIPine (NORVASC) 2.5 MG tablet, Take 1 tablet by mouth Daily., Disp: 30 tablet, Rfl: 11    aspirin 81 MG chewable tablet, Chew 1 tablet Daily., Disp: , Rfl:     atorvastatin (LIPITOR) 80 MG tablet, TAKE 1 TABLET BY MOUTH EVERY NIGHT, Disp: 90 tablet, Rfl: 1    celecoxib (CeleBREX) 200 MG capsule, Take 1 capsule by mouth 2 (Two) Times a Day As Needed for Mild Pain or Moderate Pain (Take as directed with food.)., Disp: 60 capsule, Rfl: 2    clindamycin (CLEOCIN T) 1 % lotion, APPLY A THIN LAYER TO THE AFFECTED AREA ON THE LEFT ARM TWICE DAILY DURING FLARES AND ONCE DAILY FOR MAINTENANCE, Disp: , Rfl:     clopidogrel (PLAVIX) 75 MG tablet, TAKE 1 TABLET BY MOUTH DAILY, Disp: 90 tablet, Rfl: 1    Ibuprofen 3 %, Gabapentin 10 %, Baclofen 2 %, lidocaine 4 %, Ketamine HCl 4 %, Apply 1-2 g topically to the appropriate area as directed 3 (Three) to 4 (Four)  times daily., Disp: 90 g, Rfl: 1    ketoconazole (NIZORAL) 2 % cream, Apply  topically to the appropriate area as directed., Disp: , Rfl:     Loratadine 10 MG capsule, Take  by mouth., Disp: , Rfl:     metoprolol succinate XL (TOPROL-XL) 25 MG 24 hr tablet, TAKE 1 TABLET BY MOUTH DAILY, Disp: 90 tablet, Rfl: 3    nitroglycerin (NITROSTAT) 0.4 MG SL tablet, Place 1 tablet under the tongue Every 5 (Five) Minutes As Needed for Chest Pain. Take no more than 3 doses in 15 minutes., Disp: 30 tablet, Rfl: 11    pantoprazole (PROTONIX) 40 MG EC tablet, Take 1 tablet by mouth Every Night., Disp: , Rfl:     tamsulosin (FLOMAX) 0.4 MG capsule 24 hr capsule, Take 1 capsule by mouth 2 (Two) Times a Day., Disp: , Rfl:     triamcinolone (KENALOG) 0.1 % ointment, Apply 1 application topically to the appropriate area as directed 2 (Two) Times a Day., Disp: 30 g, Rfl: 2    Past Medical History:   Diagnosis Date    Arthritis of back     Years ago    Asthma     CAD (coronary artery disease)     hx stents june 2020    Callus     Clotting disorder     Blood thinners    Fracture, clavicle     GERD (gastroesophageal reflux disease) Many years ago    Hypertension     Knee swelling Years ago    Low back strain     Lumbosacral disc disease     Other allergic rhinitis 11/03/2022    Pancreas (digestive gland) works poorly     Rotator cuff syndrome     Skin cancer     Sleep apnea        Past Surgical History:   Procedure Laterality Date    BACK SURGERY      CARDIAC CATHETERIZATION  06/2020    CARDIAC CATHETERIZATION N/A 02/21/2023    Procedure: Left Heart Cath;  Surgeon: Eduard Pedroza MD;  Location:  [facility];  Service: Cardiovascular;  Laterality: N/A;    CARDIAC CATHETERIZATION N/A 02/21/2023    Procedure: Coronary angiography;  Surgeon: Eduard Pedroza MD;  Location:  [facility];  Service: Cardiovascular;  Laterality: N/A;    CHOLECYSTECTOMY      COLONOSCOPY  2020    NORMAL    COLONOSCOPY  2023     POLYPS    CORONARY ANGIOPLASTY WITH STENT PLACEMENT      TWICE    FRACTURE SURGERY  Various    HAND SURGERY      Years ago    HEMORRHOIDECTOMY      PANCREAS SURGERY      STENTING    SHOULDER SURGERY      SINUS SURGERY  2022    Deviated septum    TOE SURGERY Right     big toe    TONSILLECTOMY      TUMOR REMOVAL      Roof Of Mouth       Social History     Occupational History    Not on file   Tobacco Use    Smoking status: Some Days     Types: Pipe     Passive exposure: Past    Smokeless tobacco: Never   Vaping Use    Vaping status: Never Used   Substance and Sexual Activity    Alcohol use: Yes     Comment: Don’t drink weekly    Drug use: No    Sexual activity: Yes     Partners: Female     Birth control/protection: None      Social History     Social History Narrative    Not on file       Family History   Problem Relation Age of Onset    Hypertension Mother     Leukemia Mother     No Known Problems Father     Hypertension Sister     Heart disease Brother     Cancer Brother         Colon cancer    Cancer Brother         Prostate    Cancer Brother         Lung cancer    Cancer Brother         Lung cancer    No Known Problems Maternal Aunt     No Known Problems Maternal Uncle     No Known Problems Paternal Aunt     No Known Problems Paternal Uncle     No Known Problems Maternal Grandmother     No Known Problems Maternal Grandfather     No Known Problems Paternal Grandmother     No Known Problems Paternal Grandfather     Cancer Other     Anesthesia problems Neg Hx     Broken bones Neg Hx     Clotting disorder Neg Hx     Collagen disease Neg Hx     Diabetes Neg Hx     Dislocations Neg Hx     Osteoporosis Neg Hx     Rheumatologic disease Neg Hx     Scoliosis Neg Hx     Severe sprains Neg Hx        Review of Systems:      Constitutional: Denies fever, shaking or chills         All other pertinent positives and negatives as noted above in HPI.    Physical Exam: 74 y.o. male    There were no vitals filed for this  visit.    General:  Patient is awake and alert.  Appears in no acute distress or discomfort.      Musculoskeletal/Extremities:    Left lower extremity examined mild tenderness along the low back region particularly about the SI joint area.  Also some tenderness over the hip bursa.  Gentle hip range of motion tolerated negative straight leg raise Stinchfield.  Motor and sensory intact distally.         Radiology:       AP and lateral imaging left hip taken reviewed to evaluate the patient's complaint/s.    Imaging demonstrates mild degenerative changes of the hip joint with some early bone sclerosis.  Evidence of likely degenerative changes of the SI joint.  No acute fractures noted.     No imaging for comparison.    Assessment: Left SI joint arthritis/inflammation, hip bursitis      Plan:      Discussed finds with the patient I think he has several things going on as noted above.  After further discussion with him he does have some knee arthritis and has gotten injections there as well so hard to tell where things stirred up from.  This time we will focus on the SI joint and the bursa.  Recommended some anti-inflammatory gel as well as icing rest activity modification and some formal therapy.  If things or not improving in the next couple weeks he can be scheduled back prior to his vacation we can determine if an injection is warranted.           We will plan for follow up as above.    All questions were answered.  Patient understands and agrees with the plan.    Mike An MD    07/09/2024    CC to Marshal Light MD       Answers submitted by the patient for this visit:  Primary Reason for Visit (Submitted on 7/7/2024)  What is the primary reason for your visit?: Other  Other (Submitted on 7/7/2024)  Please describe your symptoms.: Hip pain  Have you had these symptoms before?: Yes  How long have you been having these symptoms?: Greater than 2 weeks  Please list any medications you are currently  taking for this condition.: Celebrex 200mg

## 2024-07-15 ENCOUNTER — OFFICE VISIT (OUTPATIENT)
Age: 74
End: 2024-07-15
Payer: MEDICARE

## 2024-07-15 VITALS
HEART RATE: 64 BPM | DIASTOLIC BLOOD PRESSURE: 80 MMHG | SYSTOLIC BLOOD PRESSURE: 124 MMHG | HEIGHT: 70 IN | WEIGHT: 224.6 LBS | BODY MASS INDEX: 32.16 KG/M2

## 2024-07-15 DIAGNOSIS — I25.118 CORONARY ARTERY DISEASE OF NATIVE ARTERY OF NATIVE HEART WITH STABLE ANGINA PECTORIS: Primary | ICD-10-CM

## 2024-07-15 DIAGNOSIS — I10 ESSENTIAL HYPERTENSION: ICD-10-CM

## 2024-07-15 DIAGNOSIS — E78.00 HIGH CHOLESTEROL: ICD-10-CM

## 2024-07-15 PROCEDURE — 3074F SYST BP LT 130 MM HG: CPT | Performed by: INTERNAL MEDICINE

## 2024-07-15 PROCEDURE — 1159F MED LIST DOCD IN RCRD: CPT | Performed by: INTERNAL MEDICINE

## 2024-07-15 PROCEDURE — 3079F DIAST BP 80-89 MM HG: CPT | Performed by: INTERNAL MEDICINE

## 2024-07-15 PROCEDURE — 1160F RVW MEDS BY RX/DR IN RCRD: CPT | Performed by: INTERNAL MEDICINE

## 2024-07-15 PROCEDURE — 93000 ELECTROCARDIOGRAM COMPLETE: CPT | Performed by: INTERNAL MEDICINE

## 2024-07-15 PROCEDURE — 99214 OFFICE O/P EST MOD 30 MIN: CPT | Performed by: INTERNAL MEDICINE

## 2024-07-15 RX ORDER — ATORVASTATIN CALCIUM 40 MG/1
40 TABLET, FILM COATED ORAL NIGHTLY
Qty: 90 TABLET | Refills: 3 | Status: SHIPPED | OUTPATIENT
Start: 2024-07-15

## 2024-07-15 NOTE — PROGRESS NOTES
[facility] Follow Up Office Note     Encounter Date:07/15/24  Patient:Karan Graham  :1950  MRN:[id number]      Chief Complaint:   Chief Complaint   Patient presents with    Coronary Artery Disease     3 month f/u     History of Presenting Illness:      Mr. Graham is a 74 y.o. gentleman with past medical history notable for coronary artery disease status post percutaneous intervention, mixed hyperlipidemia, obstructive sleep apnea not on CPAP, gastric reflux disease, easy bruising, and cavernous sinus tumor who presents to our office for scheduled follow-up.  Overall patient still has episodes of chest pain shortness of breath symptoms fairly stable it has had extensive workup over the last year and a half with stress testing repeat heart catheterizations echocardiograms no real changes though          Review of Systems:  Review of Systems   Constitutional: Positive for malaise/fatigue.   HENT: Negative.     Eyes: Negative.    Cardiovascular:  Positive for dyspnea on exertion.   Respiratory:  Positive for shortness of breath.    Endocrine: Negative.    Hematologic/Lymphatic: Bruises/bleeds easily.   Skin: Negative.    Musculoskeletal: Negative.    Gastrointestinal: Negative.    Genitourinary: Negative.    Neurological: Negative.    Psychiatric/Behavioral: Negative.     Allergic/Immunologic: Negative.        Current Outpatient Medications on File Prior to Visit   Medication Sig Dispense Refill    albuterol sulfate  (90 Base) MCG/ACT inhaler INHALE 2 PUFFS EVERY 6 HOURS AS NEEDED FOR SHORTNESS OF BREATH      amLODIPine (NORVASC) 2.5 MG tablet Take 1 tablet by mouth Daily. 30 tablet 11    aspirin 81 MG chewable tablet Chew 1 tablet Daily.      celecoxib (CeleBREX) 200 MG capsule Take 1 capsule by mouth 2 (Two) Times a Day As Needed for Mild Pain or Moderate Pain (Take as directed with food.). 60 capsule 2    clindamycin (CLEOCIN T) 1 % lotion APPLY A THIN LAYER TO THE AFFECTED AREA ON THE  LEFT ARM TWICE DAILY DURING FLARES AND ONCE DAILY FOR MAINTENANCE      Ibuprofen 3 %, Gabapentin 10 %, Baclofen 2 %, lidocaine 4 %, Ketamine HCl 4 % Apply 1-2 g topically to the appropriate area as directed 3 (Three) to 4 (Four) times daily. 90 g 1    ketoconazole (NIZORAL) 2 % cream Apply  topically to the appropriate area as directed.      Loratadine 10 MG capsule Take  by mouth.      metoprolol succinate XL (TOPROL-XL) 25 MG 24 hr tablet TAKE 1 TABLET BY MOUTH DAILY 90 tablet 3    nitroglycerin (NITROSTAT) 0.4 MG SL tablet Place 1 tablet under the tongue Every 5 (Five) Minutes As Needed for Chest Pain. Take no more than 3 doses in 15 minutes. 30 tablet 11    pantoprazole (PROTONIX) 40 MG EC tablet Take 1 tablet by mouth Every Night.      tamsulosin (FLOMAX) 0.4 MG capsule 24 hr capsule Take 1 capsule by mouth 2 (Two) Times a Day.      triamcinolone (KENALOG) 0.1 % ointment Apply 1 application topically to the appropriate area as directed 2 (Two) Times a Day. 30 g 2    clopidogrel (PLAVIX) 75 MG tablet TAKE 1 TABLET BY MOUTH DAILY (Patient not taking: Reported on 7/15/2024) 90 tablet 1    [DISCONTINUED] atorvastatin (LIPITOR) 80 MG tablet TAKE 1 TABLET BY MOUTH EVERY NIGHT (Patient not taking: Reported on 7/15/2024) 90 tablet 1     No current facility-administered medications on file prior to visit.       Allergies   Allergen Reactions    Codeine Diarrhea and Nausea And Vomiting    Ibuprofen Nausea And Vomiting       Past Medical History:   Diagnosis Date    Arthritis of back     Years ago    Asthma     CAD (coronary artery disease)     hx stents june 2020    Callus     Clotting disorder     Blood thinners    Fracture, clavicle     GERD (gastroesophageal reflux disease) Many years ago    Hypertension     Knee swelling Years ago    Low back strain     Lumbosacral disc disease     Other allergic rhinitis 11/03/2022    Pancreas (digestive gland) works poorly     Rotator cuff syndrome     Skin cancer     Sleep apnea         Past Surgical History:   Procedure Laterality Date    BACK SURGERY      CARDIAC CATHETERIZATION  06/2020    CARDIAC CATHETERIZATION N/A 02/21/2023    Procedure: Left Heart Cath;  Surgeon: Eduard Pedroza MD;  Location:  [facility];  Service: Cardiovascular;  Laterality: N/A;    CARDIAC CATHETERIZATION N/A 02/21/2023    Procedure: Coronary angiography;  Surgeon: Eduard Pedroza MD;  Location:  [facility];  Service: Cardiovascular;  Laterality: N/A;    CHOLECYSTECTOMY      COLONOSCOPY  2020    NORMAL    COLONOSCOPY  2023    POLYPS    CORONARY ANGIOPLASTY WITH STENT PLACEMENT      TWICE    FRACTURE SURGERY  Various    HAND SURGERY      Years ago    HEMORRHOIDECTOMY      PANCREAS SURGERY      STENTING    SHOULDER SURGERY      SINUS SURGERY  2022    Deviated septum    TOE SURGERY Right     big toe    TONSILLECTOMY      TUMOR REMOVAL      Roof Of Mouth       Social History     Socioeconomic History    Marital status:    Tobacco Use    Smoking status: Former     Types: Pipe     Passive exposure: Past    Smokeless tobacco: Never   Vaping Use    Vaping status: Never Used   Substance and Sexual Activity    Alcohol use: Yes     Comment: Don’t drink weekly    Drug use: No    Sexual activity: Yes     Partners: Female     Birth control/protection: None       Family History   Problem Relation Age of Onset    Hypertension Mother     Leukemia Mother     No Known Problems Father     Hypertension Sister     Heart disease Brother     Cancer Brother         Colon cancer    Cancer Brother         Prostate    Cancer Brother         Lung cancer    Cancer Brother         Lung cancer    No Known Problems Maternal Aunt     No Known Problems Maternal Uncle     No Known Problems Paternal Aunt     No Known Problems Paternal Uncle     No Known Problems Maternal Grandmother     No Known Problems Maternal Grandfather     No Known Problems Paternal Grandmother     No Known Problems Paternal  "Grandfather     Cancer Other     Anesthesia problems Neg Hx     Broken bones Neg Hx     Clotting disorder Neg Hx     Collagen disease Neg Hx     Diabetes Neg Hx     Dislocations Neg Hx     Osteoporosis Neg Hx     Rheumatologic disease Neg Hx     Scoliosis Neg Hx     Severe sprains Neg Hx        The following portions of the patient's history were reviewed and updated as appropriate: allergies, current medications, past family history, past medical history, past social history, past surgical history and problem list.       Objective:       Vitals:    07/15/24 1055   BP: 124/80   BP Location: Left arm   Patient Position: Sitting   Pulse: 64   Weight: 102 kg (224 lb 9.6 oz)   Height: 177.8 cm (70\")       Body mass index is 32.23 kg/m².    Physical Exam:  Constitutional: Well appearing, Well-developed, No acute distress   HENT: Oropharynx clear and membrane moist  Eyes: Normal conjunctiva, no sclera icterus.  Neck: Supple, no carotid bruit bilaterally.  Cardiovascular: Regular rate and rhythm, No Murmur, No bilateral lower extremity edema.  Pulmonary: Normal respiratory effort, normal lung sounds, no wheezing.  Neurological: Alert and orient x 3.   Skin: Warm, dry, no ecchymosis, no rash.  Psych: Appropriate mood and affect. Normal judgment and insight.       Lab Results   Component Value Date     04/26/2024     03/26/2024    K 4.3 04/26/2024    K 4.1 03/26/2024     04/26/2024     03/26/2024    CO2 23.0 04/26/2024    CO2 24.7 03/26/2024    BUN 14 04/26/2024    BUN 12 03/26/2024    CREATININE 1.02 04/26/2024    CREATININE 1.06 03/26/2024    EGFRIFNONA 99 09/09/2020    EGFRIFNONA 95 09/08/2020    EGFRIFAFRI >60 04/04/2022    GLUCOSE 95 04/26/2024    GLUCOSE 103 (H) 03/26/2024    CALCIUM 9.1 04/26/2024    CALCIUM 9.1 03/26/2024    ALBUMIN 4.6 04/26/2024    ALBUMIN 4.2 03/26/2024    BILITOT 0.9 04/26/2024    BILITOT 0.8 03/26/2024    AST 22 04/26/2024    AST 18 03/26/2024    ALT 21 04/26/2024    ALT " 17 03/26/2024     Lab Results   Component Value Date    WBC 6.10 04/26/2024    WBC 6.51 03/26/2024    HGB 14.0 04/26/2024    HGB 13.9 03/26/2024    HCT 41.4 04/26/2024    HCT 40.5 03/26/2024    MCV 89.6 04/26/2024    MCV 88.4 03/26/2024     04/26/2024     03/26/2024     Lab Results   Component Value Date    CHOL 97 05/15/2024    CHOL 92 05/08/2023    TRIG 99 05/15/2024    TRIG 105 05/08/2023    HDL 33 (L) 05/15/2024    HDL 35 (L) 05/08/2023    LDL 45 05/15/2024    LDL 37 05/08/2023     Lab Results   Component Value Date    PROBNP <36.0 03/26/2024    PROBNP 28.7 02/08/2023    BNP <11.1 08/09/2019     Lab Results   Component Value Date    CKTOTAL 53 03/22/2017    CKMB 1.32 03/22/2017    TROPONINI <0.012 08/09/2019    TROPONINT 7 03/26/2024     Lab Results   Component Value Date    TSH 1.300 05/15/2024    TSH 0.853 05/08/2023         ECG 12 Lead    Date/Time: 7/15/2024 11:26 AM  Performed by: Eduard Pedroza MD    Authorized by: Eduard Pedroza MD  Comparison: compared with previous ECG from 4/8/2024  Similar to previous ECG  Rhythm: sinus rhythm  Conduction: right bundle branch block          Echocardiogram 3/21/2024 with images reviewed by myself:  Left ventricular systolic function is normal. Calculated left ventricular EF = 64%  Left ventricular diastolic function was normal.  There is mild calcification of the aortic valve.  Estimated right ventricular systolic pressure from tricuspid regurgitation is normal (<35 mmHg).    Cardiac catheterization 2/23/2023:  No significant coronary artery disease with patent stents in the proximal to mid LAD as well as distal RCA and the PDA with no significant in-stent restenosis.  Circumflex contains luminal irregularities.  There is some small vessel disease within the very distal circumflex but small in caliber size and distribution.  Normal LVEDP of 6 mmHg    Cardiac stress test 9/22/2022:  Myocardial perfusion imaging indicates a normal myocardial  perfusion study with no evidence of ischemia.  Left ventricular ejection fraction is hyperdynamic (Calculated EF > 70%). .  Impressions are consistent with a low risk study.    Echocardiogram 9/22/2022 images reviewed by myself:  Calculated left ventricular EF = 68.4%. Left ventricular systolic function is normal.All left ventricular wall segments contract normally.Normal Global longitudinal LV strain (GLS) = -23.2%.  Left ventricular diastolic function was normal.    Stent card 8/13/2020:  Xience RALEIGH 2.5 x 23 mm placed to distal RCA    Stent card 6/26/2020:  Promus RALEIGH placed LAD Dr. Cox at [facility]    Echocardiogram 6/22/2018 [facility]:  Overall left ventricular function is normal. The estimated ejection fraction is 60-65%.   Trace-to-mild mitral regurgitation is present.   Patent foramen ovale.     Carotid duplex 6/21/2018 [facility]:  Bilateral internal carotid arteries with plaque but <50% stenosis.  Bilateral vertebral arteries with antegrade flow.  No prior examination for comparison.           Assessment:          Diagnosis Plan   1. Coronary artery disease (stents)  ECG 12 Lead      2. High cholesterol        3. Essential hypertension                   Plan:       Mr. Graham is a 74 y.o. gentleman with past medical history notable for coronary artery disease status post percutaneous intervention, mixed hyperlipidemia, obstructive sleep apnea not on CPAP, gastric reflux disease, easy bruising, and cavernous sinus tumor who presents to our office for scheduled follow-up.  Overall patient is doing reasonably well I would continue with his current medical regimen I do not see any major changes to make with his medical regimen his blood pressure heart rate and EKG are all fairly normal she had recent cardiac testing all again all fairly bland.  With his symptoms being fairly stable and not increasing in frequency severity or duration I would hold off on any repeat testing at this time we  will see back in 6 months.  He does mention being constipated with atorvastatin he is on 80 mg dosing his cholesterol is actually in the low side Dilma cut it in half to see if he tolerates this better      Coronary artery disease with stable angina:  Stress test 9/2022 demonstrates no evidence of ischemia and follow up heart catheterization with no epicardial disease with patent stent.  Had headache with Imdur  Continue amlodipine  Tried Ranexa but did not like it  Continue aspirin and clopidogrel  Continue statin  Continue low-dose beta-blocker    Essential hypertension:  Blood pressure well controlled no changes needed at this time    Mixed hyperlipidemia:  Continue statin therapy but will back off he is concerned about constipation with higher dose  LDL well controlled 5/2024  CMP with normal ALT and AST 5/2024      Follow-up:  6 months      Thank you for allowing me to participate in the care of Karan Graham. Feel free to contact me directly with any further questions or concerns.    Eduard Pedroza MD  [facility]  07/15/24  12:32 EDT

## 2024-07-23 ENCOUNTER — OFFICE VISIT (OUTPATIENT)
Dept: ORTHOPEDIC SURGERY | Facility: CLINIC | Age: 74
End: 2024-07-23
Payer: MEDICARE

## 2024-07-23 VITALS — WEIGHT: 225.4 LBS | TEMPERATURE: 98.6 F | HEIGHT: 70 IN | BODY MASS INDEX: 32.27 KG/M2

## 2024-07-23 DIAGNOSIS — M46.1 SI (SACROILIAC) JOINT INFLAMMATION: Primary | ICD-10-CM

## 2024-07-23 DIAGNOSIS — M70.62 TROCHANTERIC BURSITIS OF LEFT HIP: ICD-10-CM

## 2024-07-23 PROCEDURE — 1159F MED LIST DOCD IN RCRD: CPT | Performed by: ORTHOPAEDIC SURGERY

## 2024-07-23 PROCEDURE — 1160F RVW MEDS BY RX/DR IN RCRD: CPT | Performed by: ORTHOPAEDIC SURGERY

## 2024-07-23 PROCEDURE — 99214 OFFICE O/P EST MOD 30 MIN: CPT | Performed by: ORTHOPAEDIC SURGERY

## 2024-07-23 RX ORDER — METHYLPREDNISOLONE 4 MG/1
TABLET ORAL
Qty: 21 TABLET | Refills: 0 | Status: SHIPPED | OUTPATIENT
Start: 2024-07-23

## 2024-07-23 NOTE — PROGRESS NOTES
Patient: Karan Graham  YOB: 1950 74 y.o. male  Medical Record Number: [id number]    Chief Complaint:   Chief Complaint   Patient presents with    Left Hip - Pain, Follow-up       History of Present Illness:Karan Graham is a 74 y.o. male who presents for follow-up of left low back and hip pain.  Patient states he still has some discomfort in the left side of the low back pelvic area.  Minimal to mild pain laterally about the hip.  No groin pain.  Has not done therapy yet.    Allergies:   Allergies   Allergen Reactions    Codeine Diarrhea and Nausea And Vomiting    Ibuprofen Nausea And Vomiting       Medications:   Current Outpatient Medications   Medication Sig Dispense Refill    albuterol sulfate  (90 Base) MCG/ACT inhaler INHALE 2 PUFFS EVERY 6 HOURS AS NEEDED FOR SHORTNESS OF BREATH      amLODIPine (NORVASC) 2.5 MG tablet Take 1 tablet by mouth Daily. 30 tablet 11    aspirin 81 MG chewable tablet Chew 1 tablet Daily.      atorvastatin (LIPITOR) 40 MG tablet Take 1 tablet by mouth Every Night. 90 tablet 3    celecoxib (CeleBREX) 200 MG capsule Take 1 capsule by mouth 2 (Two) Times a Day As Needed for Mild Pain or Moderate Pain (Take as directed with food.). 60 capsule 2    clindamycin (CLEOCIN T) 1 % lotion APPLY A THIN LAYER TO THE AFFECTED AREA ON THE LEFT ARM TWICE DAILY DURING FLARES AND ONCE DAILY FOR MAINTENANCE      clopidogrel (PLAVIX) 75 MG tablet TAKE 1 TABLET BY MOUTH DAILY 90 tablet 1    Ibuprofen 3 %, Gabapentin 10 %, Baclofen 2 %, lidocaine 4 %, Ketamine HCl 4 % Apply 1-2 g topically to the appropriate area as directed 3 (Three) to 4 (Four) times daily. 90 g 1    ketoconazole (NIZORAL) 2 % cream Apply  topically to the appropriate area as directed.      Loratadine 10 MG capsule Take  by mouth.      metoprolol succinate XL (TOPROL-XL) 25 MG 24 hr tablet TAKE 1 TABLET BY MOUTH DAILY 90 tablet 3    nitroglycerin (NITROSTAT) 0.4 MG SL tablet Place 1 tablet under the tongue  "Every 5 (Five) Minutes As Needed for Chest Pain. Take no more than 3 doses in 15 minutes. 30 tablet 11    pantoprazole (PROTONIX) 40 MG EC tablet Take 1 tablet by mouth Every Night.      tamsulosin (FLOMAX) 0.4 MG capsule 24 hr capsule Take 1 capsule by mouth 2 (Two) Times a Day.      triamcinolone (KENALOG) 0.1 % ointment Apply 1 application topically to the appropriate area as directed 2 (Two) Times a Day. 30 g 2    methylPREDNISolone (MEDROL) 4 MG dose pack Use as directed by package instructions 21 tablet 0     No current facility-administered medications for this visit.         The following portions of the patient's history were reviewed and updated as appropriate: allergies, current medications, past family history, past medical history, past social history, past surgical history and problem list.    Review of Systems:   A 14 point review of systems was performed. All systems negative except pertinent positives/negative listed in HPI above    Physical Exam:   Vitals:    07/23/24 1026   Temp: 98.6 °F (37 °C)   Weight: 102 kg (225 lb 6.4 oz)   Height: 177.8 cm (70\")   PainSc:   3   PainLoc: Hip       General: A and O x 3, ASA, NAD    SCLERA:    Normal    DENTITION:   Normal  Left lower extremity examined still some tenderness about the SI joint left side.      Assessment/Plan:  Left SI joint inflammation, hip bursitis    Tell the patient I recommend continue conservative treatment but we will order Medrol Dosepak to see if they can help with his symptoms as well as some formal therapy to make sure return in the right area.  If symptoms continue after Medrol Dosepak and therapy may return for further evaluation if needed.  If warranted may send for injections depending on symptoms. All questions answered      Mike An MD  7/23/2024   "

## 2024-08-14 RX ORDER — PANTOPRAZOLE SODIUM 40 MG/1
40 TABLET, DELAYED RELEASE ORAL NIGHTLY
Qty: 90 TABLET | Refills: 0 | Status: SHIPPED | OUTPATIENT
Start: 2024-08-14

## 2024-09-30 ENCOUNTER — OFFICE (OUTPATIENT)
Dept: URBAN - METROPOLITAN AREA CLINIC 76 | Facility: CLINIC | Age: 74
End: 2024-09-30

## 2024-09-30 ENCOUNTER — OFFICE (OUTPATIENT)
Age: 74
End: 2024-09-30

## 2024-09-30 VITALS
OXYGEN SATURATION: 97 % | DIASTOLIC BLOOD PRESSURE: 70 MMHG | HEART RATE: 64 BPM | HEART RATE: 64 BPM | HEIGHT: 70 IN | DIASTOLIC BLOOD PRESSURE: 70 MMHG | DIASTOLIC BLOOD PRESSURE: 70 MMHG | SYSTOLIC BLOOD PRESSURE: 126 MMHG | WEIGHT: 222 LBS | DIASTOLIC BLOOD PRESSURE: 70 MMHG | DIASTOLIC BLOOD PRESSURE: 70 MMHG | SYSTOLIC BLOOD PRESSURE: 126 MMHG | HEIGHT: 70 IN | OXYGEN SATURATION: 97 % | HEIGHT: 70 IN | OXYGEN SATURATION: 97 % | HEART RATE: 64 BPM | HEIGHT: 70 IN | HEART RATE: 64 BPM | OXYGEN SATURATION: 97 % | HEIGHT: 70 IN | HEIGHT: 70 IN | HEART RATE: 64 BPM | SYSTOLIC BLOOD PRESSURE: 126 MMHG | SYSTOLIC BLOOD PRESSURE: 126 MMHG | WEIGHT: 222 LBS | DIASTOLIC BLOOD PRESSURE: 70 MMHG | HEART RATE: 64 BPM | HEIGHT: 70 IN | WEIGHT: 222 LBS | SYSTOLIC BLOOD PRESSURE: 126 MMHG | DIASTOLIC BLOOD PRESSURE: 70 MMHG | OXYGEN SATURATION: 97 % | SYSTOLIC BLOOD PRESSURE: 126 MMHG | WEIGHT: 222 LBS | WEIGHT: 222 LBS | OXYGEN SATURATION: 97 % | HEART RATE: 64 BPM | OXYGEN SATURATION: 97 % | SYSTOLIC BLOOD PRESSURE: 126 MMHG | WEIGHT: 222 LBS | WEIGHT: 222 LBS

## 2024-09-30 DIAGNOSIS — K86.1 OTHER CHRONIC PANCREATITIS: ICD-10-CM

## 2024-09-30 DIAGNOSIS — K92.1 MELENA: ICD-10-CM

## 2024-09-30 DIAGNOSIS — K31.84 GASTROPARESIS: ICD-10-CM

## 2024-09-30 PROCEDURE — 99214 OFFICE O/P EST MOD 30 MIN: CPT

## 2024-10-10 ENCOUNTER — OFFICE VISIT (OUTPATIENT)
Dept: CARDIOLOGY | Facility: CLINIC | Age: 74
End: 2024-10-10
Payer: MEDICARE

## 2024-10-10 VITALS
DIASTOLIC BLOOD PRESSURE: 74 MMHG | BODY MASS INDEX: 31.92 KG/M2 | HEART RATE: 70 BPM | WEIGHT: 223 LBS | HEIGHT: 70 IN | SYSTOLIC BLOOD PRESSURE: 132 MMHG

## 2024-10-10 DIAGNOSIS — I10 ESSENTIAL HYPERTENSION: ICD-10-CM

## 2024-10-10 DIAGNOSIS — E78.00 HIGH CHOLESTEROL: Primary | ICD-10-CM

## 2024-10-10 DIAGNOSIS — I25.118 CORONARY ARTERY DISEASE OF NATIVE ARTERY OF NATIVE HEART WITH STABLE ANGINA PECTORIS: ICD-10-CM

## 2024-10-10 RX ORDER — METOPROLOL SUCCINATE 25 MG/1
12.5 TABLET, EXTENDED RELEASE ORAL DAILY
Start: 2024-10-10

## 2024-10-10 NOTE — PROGRESS NOTES
Subjective:     Encounter Date:10/10/2024      Patient ID: Karan Graham is a 74 y.o. male.    Chief Complaint:follow up CAD  History of Present Illness  This is a 74-year-old man who follows with Dr. Pedroza and is new to me today.  He has a past medical history of coronary artery disease, hyperlipidemia, obstructive sleep apnea not on CPAP, gastric reflux, cavernous sinus tumor and easy bruising.    He was last seen by Dr. Pedroza in July and was still having episodes of chest pain and shortness of breath that appeared to be pretty stable.  He had undergone extensive workup over the past year and a half with a stress test and repeat cardiac catheterization along with echocardiograms and there was no real change.    He is here today for a follow-up visit. His chest pain and shortness of breath is about the same. He has been getting dizzy with bending over for the last 6 months. BP is typically 120s/60s. He reports feeling more tired. 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 He denies palpitations or syncope. He has some swelling in his lower extremities that is stable. No reported orthopnea or PND.    I have reviewed and updated as appropriate allergies, current medications, past family history, past medical history, past surgical history and problem list.    Review of Systems   Constitutional: Positive for malaise/fatigue. Negative for fever, weight gain and weight loss.   HENT:  Negative for congestion, hoarse voice and sore throat.    Eyes:  Negative for blurred vision and double vision.   Cardiovascular:  Positive for dyspnea on exertion and leg swelling. Negative for chest pain, orthopnea, palpitations and syncope.   Respiratory:  Positive for shortness of breath. Negative for cough and wheezing.    Gastrointestinal:  Negative for abdominal pain, hematemesis, hematochezia and melena.   Genitourinary:  Negative for dysuria and hematuria.    Neurological:  Positive for dizziness. Negative for headaches, light-headedness and numbness.   Psychiatric/Behavioral:  Negative for depression. The patient is not nervous/anxious.          Current Outpatient Medications:     albuterol sulfate  (90 Base) MCG/ACT inhaler, INHALE 2 PUFFS EVERY 6 HOURS AS NEEDED FOR SHORTNESS OF BREATH, Disp: , Rfl:     amLODIPine (NORVASC) 2.5 MG tablet, Take 1 tablet by mouth Daily., Disp: 30 tablet, Rfl: 11    aspirin 81 MG chewable tablet, Chew 1 tablet Daily., Disp: , Rfl:     atorvastatin (LIPITOR) 40 MG tablet, Take 1 tablet by mouth Every Night., Disp: 90 tablet, Rfl: 3    celecoxib (CeleBREX) 200 MG capsule, Take 1 capsule by mouth 2 (Two) Times a Day As Needed for Mild Pain or Moderate Pain (Take as directed with food.)., Disp: 60 capsule, Rfl: 2    clindamycin (CLEOCIN T) 1 % lotion, APPLY A THIN LAYER TO THE AFFECTED AREA ON THE LEFT ARM TWICE DAILY DURING FLARES AND ONCE DAILY FOR MAINTENANCE, Disp: , Rfl:     clopidogrel (PLAVIX) 75 MG tablet, TAKE 1 TABLET BY MOUTH DAILY, Disp: 90 tablet, Rfl: 1    Ibuprofen 3 %, Gabapentin 10 %, Baclofen 2 %, lidocaine 4 %, Ketamine HCl 4 %, Apply 1-2 g topically to the appropriate area as directed 3 (Three) to 4 (Four) times daily., Disp: 90 g, Rfl: 1    ketoconazole (NIZORAL) 2 % cream, Apply  topically to the appropriate area as directed., Disp: , Rfl:     Loratadine 10 MG capsule, Take  by mouth., Disp: , Rfl:     metoprolol succinate XL (TOPROL-XL) 25 MG 24 hr tablet, Take 0.5 tablets by mouth Daily., Disp: , Rfl:     nitroglycerin (NITROSTAT) 0.4 MG SL tablet, Place 1 tablet under the tongue Every 5 (Five) Minutes As Needed for Chest Pain. Take no more than 3 doses in 15 minutes., Disp: 30 tablet, Rfl: 11    pantoprazole (PROTONIX) 40 MG EC tablet, TAKE 1 TABLET BY MOUTH EVERY NIGHT, Disp: 90 tablet, Rfl: 0    tamsulosin (FLOMAX) 0.4 MG capsule 24 hr capsule, Take 1 capsule by mouth 2 (Two) Times a Day., Disp: , Rfl:      triamcinolone (KENALOG) 0.1 % ointment, Apply 1 application topically to the appropriate area as directed 2 (Two) Times a Day., Disp: 30 g, Rfl: 2    Past Medical History:   Diagnosis Date    Arthritis of back     Years ago    Asthma     CAD (coronary artery disease)     hx stents june 2020    Callus     Clotting disorder     Blood thinners    Fracture, clavicle     GERD (gastroesophageal reflux disease) Many years ago    Hypertension     Knee swelling Years ago    Low back strain     Lumbosacral disc disease     Other allergic rhinitis 11/03/2022    Pancreas (digestive gland) works poorly     Rotator cuff syndrome     Skin cancer     Sleep apnea        Past Surgical History:   Procedure Laterality Date    BACK SURGERY      CARDIAC CATHETERIZATION  06/2020    CARDIAC CATHETERIZATION N/A 02/21/2023    Procedure: Left Heart Cath;  Surgeon: Eduard Pedroza MD;  Location:  [facility];  Service: Cardiovascular;  Laterality: N/A;    CARDIAC CATHETERIZATION N/A 02/21/2023    Procedure: Coronary angiography;  Surgeon: Eduard Pedroza MD;  Location:  [facility];  Service: Cardiovascular;  Laterality: N/A;    CHOLECYSTECTOMY      COLONOSCOPY  2020    NORMAL    COLONOSCOPY  2023    POLYPS    CORONARY ANGIOPLASTY WITH STENT PLACEMENT      TWICE    FRACTURE SURGERY  Various    HAND SURGERY      Years ago    HEMORRHOIDECTOMY      PANCREAS SURGERY      STENTING    SHOULDER SURGERY      SINUS SURGERY  2022    Deviated septum    TOE SURGERY Right     big toe    TONSILLECTOMY      TUMOR REMOVAL      Roof Of Mouth       Family History   Problem Relation Age of Onset    Hypertension Mother     Leukemia Mother     No Known Problems Father     Hypertension Sister     Heart disease Brother     Cancer Brother         Colon cancer    Cancer Brother         Prostate    Cancer Brother         Lung cancer    Cancer Brother         Lung cancer    No Known Problems Maternal Aunt     No Known Problems  "Maternal Uncle     No Known Problems Paternal Aunt     No Known Problems Paternal Uncle     No Known Problems Maternal Grandmother     No Known Problems Maternal Grandfather     No Known Problems Paternal Grandmother     No Known Problems Paternal Grandfather     Cancer Other     Anesthesia problems Neg Hx     Broken bones Neg Hx     Clotting disorder Neg Hx     Collagen disease Neg Hx     Diabetes Neg Hx     Dislocations Neg Hx     Osteoporosis Neg Hx     Rheumatologic disease Neg Hx     Scoliosis Neg Hx     Severe sprains Neg Hx        Social History     Tobacco Use    Smoking status: Former     Types: Pipe     Passive exposure: Past    Smokeless tobacco: Never   Vaping Use    Vaping status: Never Used   Substance Use Topics    Alcohol use: Yes     Comment: Don’t drink weekly    Drug use: No         ECG 12 Lead    Date/Time: 10/10/2024 10:42 AM  Performed by: Madeline Arita APRN    Authorized by: Madeline Arita APRN  Comparison: compared with previous ECG from 7/15/2024  Similar to previous ECG  Rhythm: sinus rhythm  Conduction: right bundle branch block             Objective:     Visit Vitals  /74   Pulse 70   Ht 177.8 cm (70\")   Wt 101 kg (223 lb)   BMI 32.00 kg/m²             Physical Exam  Constitutional:       Appearance: Normal appearance. He is normal weight.   HENT:      Head: Normocephalic.   Neck:      Vascular: No carotid bruit.   Cardiovascular:      Rate and Rhythm: Normal rate and regular rhythm.      Chest Wall: PMI is not displaced.      Pulses: Normal pulses.           Radial pulses are 2+ on the right side and 2+ on the left side.        Posterior tibial pulses are 2+ on the right side and 2+ on the left side.      Heart sounds: Normal heart sounds. No murmur heard.     No friction rub. No gallop.   Pulmonary:      Effort: Pulmonary effort is normal.      Breath sounds: Normal breath sounds.   Abdominal:      General: Bowel sounds are normal. There is no distension.      Palpations: " Abdomen is soft.   Musculoskeletal:      Right lower le+ Edema present.      Left lower le+ Edema present.   Skin:     General: Skin is warm and dry.      Capillary Refill: Capillary refill takes less than 2 seconds.   Neurological:      Mental Status: He is alert and oriented to person, place, and time.   Psychiatric:         Mood and Affect: Mood normal.         Behavior: Behavior normal.         Thought Content: Thought content normal.          Lab Review:   Lipid Panel          5/15/2024    10:14   Lipid Panel   Total Cholesterol 97    Triglycerides 99    HDL Cholesterol 33    VLDL Cholesterol 19    LDL Cholesterol  45    LDL/HDL Ratio 1.34          Cardiac Procedures:       Assessment:         Diagnoses and all orders for this visit:    1. High cholesterol (Primary)    2. Coronary artery disease (stents)    3. Essential hypertension    Other orders  -     metoprolol succinate XL (TOPROL-XL) 25 MG 24 hr tablet; Take 0.5 tablets by mouth Daily.            Plan:       CAD: with stable angina. Stress test in  showed no evidence of ischemia and follow up heart catheterization showed patent stent. He has been tried on Imdur but developed headache. Unable to tolerate Ranexa. On aspirin, clopidogrel, statin, beta blocker and amlodipine. EKG is stable. Due to complaints of dizziness and some ED issues as well, I am going to try to decrease Toprol XL and see how he does with this.  HTN: blood pressure well controlled at home in the low 120s/60s. Will monitor on the lower dose of Toprol XL  Dizziness: plan as above in #1 along with recommended compression socks.  HLD: on statin therapy. Goal LDL < 70. Most recent was 45.    Thank you for allowing me to participate in this patient's care. Please call with any questions or concerns. Mr. Graham will follow up with Dr. Pedroza in 6 months.          Your medication list            Accurate as of October 10, 2024  9:55 AM. If you have any questions, ask your nurse  or doctor.                CHANGE how you take these medications        Instructions Last Dose Given Next Dose Due   metoprolol succinate XL 25 MG 24 hr tablet  Commonly known as: TOPROL-XL  What changed: how much to take  Changed by: Madeline Arita      Take 0.5 tablets by mouth Daily.              CONTINUE taking these medications        Instructions Last Dose Given Next Dose Due   albuterol sulfate  (90 Base) MCG/ACT inhaler  Commonly known as: PROVENTIL HFA;VENTOLIN HFA;PROAIR HFA      INHALE 2 PUFFS EVERY 6 HOURS AS NEEDED FOR SHORTNESS OF BREATH       amLODIPine 2.5 MG tablet  Commonly known as: NORVASC      Take 1 tablet by mouth Daily.       aspirin 81 MG chewable tablet      Chew 1 tablet Daily.       atorvastatin 40 MG tablet  Commonly known as: LIPITOR      Take 1 tablet by mouth Every Night.       celecoxib 200 MG capsule  Commonly known as: CeleBREX      Take 1 capsule by mouth 2 (Two) Times a Day As Needed for Mild Pain or Moderate Pain (Take as directed with food.).       clindamycin 1 % lotion  Commonly known as: CLEOCIN T      APPLY A THIN LAYER TO THE AFFECTED AREA ON THE LEFT ARM TWICE DAILY DURING FLARES AND ONCE DAILY FOR MAINTENANCE       clopidogrel 75 MG tablet  Commonly known as: PLAVIX      TAKE 1 TABLET BY MOUTH DAILY       Ibuprofen 3 %, Gabapentin 10 %, Baclofen 2 %, lidocaine 4 %, Ketamine HCl 4 %      Apply 1-2 g topically to the appropriate area as directed 3 (Three) to 4 (Four) times daily.       ketoconazole 2 % cream  Commonly known as: NIZORAL      Apply  topically to the appropriate area as directed.       Loratadine 10 MG capsule      Take  by mouth.       nitroglycerin 0.4 MG SL tablet  Commonly known as: NITROSTAT      Place 1 tablet under the tongue Every 5 (Five) Minutes As Needed for Chest Pain. Take no more than 3 doses in 15 minutes.       pantoprazole 40 MG EC tablet  Commonly known as: PROTONIX      TAKE 1 TABLET BY MOUTH EVERY NIGHT       tamsulosin 0.4 MG capsule  24 hr capsule  Commonly known as: FLOMAX      Take 1 capsule by mouth 2 (Two) Times a Day.       triamcinolone 0.1 % ointment  Commonly known as: KENALOG      Apply 1 application topically to the appropriate area as directed 2 (Two) Times a Day.                 Where to Get Your Medications        Information about where to get these medications is not yet available    Ask your nurse or doctor about these medications  metoprolol succinate XL 25 MG 24 hr tablet           AMINAH Call  10/10/24  9:16 AM EDT

## 2024-10-21 ENCOUNTER — TELEPHONE (OUTPATIENT)
Dept: ORTHOPEDIC SURGERY | Facility: CLINIC | Age: 74
End: 2024-10-21
Payer: MEDICARE

## 2024-10-21 NOTE — TELEPHONE ENCOUNTER
Spoke with patient's wife and stated per Dr. An; that at the patients last office visit on 7- an MRI was not discussed but a possible hip injection was offered. Patient is scheduled on 10-- to for further evaluation and to discuss treatment options.

## 2024-10-21 NOTE — TELEPHONE ENCOUNTER
Caller: Karan Graham    Relationship: Self    Best call back number: [phone]    CALL WIFE ROGELIO GRAHAM- [phone]    What orders are you requesting (i.e. lab or imaging): MRI OF THE LT HIP AND LOWER BACK    In what timeframe would the patient need to come in: ASAP    Where will you receive your lab/imaging services: [facility]-    IF IT IS FASTER- PT DOESN'T MIND BEING SEEN AT THE [facility] LOCATION     Additional notes: PT STATES THAT HE HAS SEEN DR. HALL ON 7.23.24 FOR HIS LT HIP PAIN- PT WOULD LIKE TO SEE IF HE CAN HAVE AN MRI OF HIS LT HIP AND HIS LOWER BACK IF POSSIBLE ON THE SAME DAY.    PLEASE CONTACT PT AND ADVISE

## 2024-10-22 RX ORDER — CELECOXIB 200 MG/1
CAPSULE ORAL
Qty: 60 CAPSULE | Refills: 2 | Status: SHIPPED | OUTPATIENT
Start: 2024-10-22

## 2024-10-24 ENCOUNTER — OFFICE VISIT (OUTPATIENT)
Dept: ORTHOPEDIC SURGERY | Facility: CLINIC | Age: 74
End: 2024-10-24
Payer: MEDICARE

## 2024-10-24 VITALS — WEIGHT: 223.3 LBS | TEMPERATURE: 98.6 F | BODY MASS INDEX: 31.97 KG/M2 | HEIGHT: 70 IN

## 2024-10-24 DIAGNOSIS — M70.72 ISCHIAL BURSITIS, LEFT: Primary | ICD-10-CM

## 2024-10-24 PROCEDURE — 1159F MED LIST DOCD IN RCRD: CPT | Performed by: ORTHOPAEDIC SURGERY

## 2024-10-24 PROCEDURE — 1160F RVW MEDS BY RX/DR IN RCRD: CPT | Performed by: ORTHOPAEDIC SURGERY

## 2024-10-24 PROCEDURE — 99214 OFFICE O/P EST MOD 30 MIN: CPT | Performed by: ORTHOPAEDIC SURGERY

## 2024-10-24 RX ORDER — DICLOFENAC SODIUM 1 MG/ML
SOLUTION/ DROPS OPHTHALMIC
COMMUNITY
Start: 2024-09-13

## 2024-10-24 NOTE — PROGRESS NOTES
General Exam    Patient: Karan Graham    YOB: 1950    Medical Record Number: [id number]    Chief Complaints: Deep buttock pain    History of Present Illness:     74 y.o. male patient who presents for left deep buttock pain.  Patient been seen previously diagnosed possible SI joint inflammation and hip bursitis.  He states that his symptoms are more deep buttock region at this time it is hard with certain activities is hard to sit on that side and hard surfaces cause quite a bit of pain.  We really pushes on that area is very tender.    Denies any numbness or tingling.  Denies any fevers, cough or shortness of breath.    Allergies:   Allergies   Allergen Reactions    Codeine Diarrhea and Nausea And Vomiting    Ibuprofen Nausea And Vomiting       Home Medications:      Current Outpatient Medications:     albuterol sulfate  (90 Base) MCG/ACT inhaler, INHALE 2 PUFFS EVERY 6 HOURS AS NEEDED FOR SHORTNESS OF BREATH, Disp: , Rfl:     amLODIPine (NORVASC) 2.5 MG tablet, Take 1 tablet by mouth Daily., Disp: 30 tablet, Rfl: 11    aspirin 81 MG chewable tablet, Chew 1 tablet Daily., Disp: , Rfl:     atorvastatin (LIPITOR) 40 MG tablet, Take 1 tablet by mouth Every Night., Disp: 90 tablet, Rfl: 3    celecoxib (CeleBREX) 200 MG capsule, TAKE 1 CAPSULE BY MOUTH TWICE DAILY WITH FOOD AS NEEDED AS DIRECTED FOR MILD PAIN OR MODERATE PAIN, Disp: 60 capsule, Rfl: 2    clindamycin (CLEOCIN T) 1 % lotion, APPLY A THIN LAYER TO THE AFFECTED AREA ON THE LEFT ARM TWICE DAILY DURING FLARES AND ONCE DAILY FOR MAINTENANCE, Disp: , Rfl:     clopidogrel (PLAVIX) 75 MG tablet, TAKE 1 TABLET BY MOUTH DAILY, Disp: 90 tablet, Rfl: 1    diclofenac (VOLTAREN) 0.1 % ophthalmic solution, PLACE 1 DROP INTO BOTH EYES FOUR TIMES DAILY FOR 30 DAYS, Disp: , Rfl:     Ibuprofen 3 %, Gabapentin 10 %, Baclofen 2 %, lidocaine 4 %, Ketamine HCl 4 %, Apply 1-2 g topically to the appropriate area as directed 3 (Three) to 4 (Four) times  daily., Disp: 90 g, Rfl: 1    ketoconazole (NIZORAL) 2 % cream, Apply  topically to the appropriate area as directed., Disp: , Rfl:     Loratadine 10 MG capsule, Take  by mouth., Disp: , Rfl:     metoprolol succinate XL (TOPROL-XL) 25 MG 24 hr tablet, Take 0.5 tablets by mouth Daily., Disp: , Rfl:     nitroglycerin (NITROSTAT) 0.4 MG SL tablet, Place 1 tablet under the tongue Every 5 (Five) Minutes As Needed for Chest Pain. Take no more than 3 doses in 15 minutes., Disp: 30 tablet, Rfl: 11    pantoprazole (PROTONIX) 40 MG EC tablet, TAKE 1 TABLET BY MOUTH EVERY NIGHT, Disp: 90 tablet, Rfl: 0    tamsulosin (FLOMAX) 0.4 MG capsule 24 hr capsule, Take 1 capsule by mouth 2 (Two) Times a Day., Disp: , Rfl:     triamcinolone (KENALOG) 0.1 % ointment, Apply 1 application topically to the appropriate area as directed 2 (Two) Times a Day., Disp: 30 g, Rfl: 2    Past Medical History:   Diagnosis Date    Arthritis of back     Years ago    Asthma     CAD (coronary artery disease)     hx stents june 2020    Callus     Clotting disorder     Blood thinners    Fracture, clavicle     GERD (gastroesophageal reflux disease) Many years ago    Hypertension     Knee swelling Years ago    Low back strain     Lumbosacral disc disease     Other allergic rhinitis 11/03/2022    Pancreas (digestive gland) works poorly     Rotator cuff syndrome     Skin cancer     Sleep apnea        Past Surgical History:   Procedure Laterality Date    BACK SURGERY      CARDIAC CATHETERIZATION  06/2020    CARDIAC CATHETERIZATION N/A 02/21/2023    Procedure: Left Heart Cath;  Surgeon: Eduard Pedroza MD;  Location:  [facility];  Service: Cardiovascular;  Laterality: N/A;    CARDIAC CATHETERIZATION N/A 02/21/2023    Procedure: Coronary angiography;  Surgeon: Eduard Pedroza MD;  Location:  [facility];  Service: Cardiovascular;  Laterality: N/A;    CHOLECYSTECTOMY      COLONOSCOPY  2020    NORMAL    COLONOSCOPY  2023     POLYPS    CORONARY ANGIOPLASTY WITH STENT PLACEMENT      TWICE    FRACTURE SURGERY  Various    HAND SURGERY      Years ago    HEMORRHOIDECTOMY      PANCREAS SURGERY      STENTING    SHOULDER SURGERY      SINUS SURGERY  2022    Deviated septum    TOE SURGERY Right     big toe    TONSILLECTOMY      TUMOR REMOVAL      Roof Of Mouth       Social History     Occupational History    Not on file   Tobacco Use    Smoking status: Former     Types: Pipe     Passive exposure: Past    Smokeless tobacco: Never   Vaping Use    Vaping status: Never Used   Substance and Sexual Activity    Alcohol use: Yes     Comment: Don’t drink weekly    Drug use: No    Sexual activity: Yes     Partners: Female     Birth control/protection: None      Social History     Social History Narrative    Not on file       Family History   Problem Relation Age of Onset    Hypertension Mother     Leukemia Mother     No Known Problems Father     Hypertension Sister     Heart disease Brother     Cancer Brother         Colon cancer    Cancer Brother         Prostate    Cancer Brother         Lung cancer    Cancer Brother         Lung cancer    No Known Problems Maternal Aunt     No Known Problems Maternal Uncle     No Known Problems Paternal Aunt     No Known Problems Paternal Uncle     No Known Problems Maternal Grandmother     No Known Problems Maternal Grandfather     No Known Problems Paternal Grandmother     No Known Problems Paternal Grandfather     Cancer Other     Anesthesia problems Neg Hx     Broken bones Neg Hx     Clotting disorder Neg Hx     Collagen disease Neg Hx     Diabetes Neg Hx     Dislocations Neg Hx     Osteoporosis Neg Hx     Rheumatologic disease Neg Hx     Scoliosis Neg Hx     Severe sprains Neg Hx        Review of Systems:      Constitutional: Denies fever, shaking or chills         All other pertinent positives and negatives as noted above in HPI.    Physical Exam: 74 y.o. male    Vitals:    10/24/24 1612   Temp: 98.6 °F (37 °C)  "  TempSrc: Temporal   Weight: 101 kg (223 lb 4.8 oz)   Height: 177.8 cm (70\")       General:  Patient is awake and alert.  Appears in no acute distress or discomfort.      Musculoskeletal/Extremities:    Left hip and buttock examined positive tenderness to the ischial tuberosity.  Gentle hip range of motion well-tolerated.  No significant tenderness laterally about the hip.             Assessment: Left ischial bursitis      Plan:      I discussed the findings with the patient I think is more soft tissue in nature and this is different than how he presented several months ago.  His condition is quite limiting.  At this time would recommend referral to sports medicine for steroid injection.  Formal physical therapy.  Will get him a cane to help offload that side.  Rest, ice, anti-inflammatory as can take and tolerate.           We will plan for follow up as above.    All questions were answered.  Patient understands and agrees with the plan.    Mike An MD    10/24/2024    CC to Marshal Light MD       "

## 2024-10-28 ENCOUNTER — TELEPHONE (OUTPATIENT)
Dept: SPORTS MEDICINE | Facility: CLINIC | Age: 74
End: 2024-10-28
Payer: MEDICARE

## 2024-10-28 NOTE — TELEPHONE ENCOUNTER
Patient has a procedure on 10-31. He sates that he is scared of needles. He is requesting a couple of pills to relax.  Send to [facility] pharmacy  [address].    He is requesting a call back when the prescription has been sent.    Please advise

## 2024-10-29 ENCOUNTER — TELEPHONE (OUTPATIENT)
Dept: SPORTS MEDICINE | Facility: CLINIC | Age: 74
End: 2024-10-29

## 2024-10-29 DIAGNOSIS — M25.559 HIP PAIN, UNSPECIFIED LATERALITY: Primary | ICD-10-CM

## 2024-10-29 RX ORDER — DIAZEPAM 5 MG/1
TABLET ORAL
Qty: 1 TABLET | Refills: 0 | Status: SHIPPED | OUTPATIENT
Start: 2024-10-29 | End: 2024-10-31

## 2024-10-29 NOTE — TELEPHONE ENCOUNTER
Caller: Karan Graham    Relationship to patient: Self    Best call back number: [phone] (home)       Patient is needing: PATIENT STATES HE IS TERRRIFIED OF NEEDLES. HE WOULD LIKE TWO VALIUM PILLS THE DAY HE HAS HIS PROCEDURE DONE.     THE APPOINTMEN IS FOR LEFT HIP U/S GUIDED INJECTION.   10/31/2024 AT 2PM WITH  [id number] - [place], [place] - [address] [facility] [address] [facility] AT [facility] OF [address]  - [phone]  - [phone]  [phone]

## 2024-10-29 NOTE — TELEPHONE ENCOUNTER
No BH verbal for our office there for I am unable to provide wife with Dr. Mejia's response.     Call his number ending on [phone], left a VM requesting a call back.   RELAY  Dr. Mejia has sent a prescription for Valium, patient will need a  to and from the doctors office.

## 2024-10-31 ENCOUNTER — OFFICE VISIT (OUTPATIENT)
Dept: SPORTS MEDICINE | Facility: CLINIC | Age: 74
End: 2024-10-31
Payer: MEDICARE

## 2024-10-31 VITALS
DIASTOLIC BLOOD PRESSURE: 68 MMHG | HEIGHT: 70 IN | OXYGEN SATURATION: 96 % | TEMPERATURE: 95.6 F | HEART RATE: 74 BPM | WEIGHT: 223 LBS | BODY MASS INDEX: 31.92 KG/M2 | SYSTOLIC BLOOD PRESSURE: 120 MMHG

## 2024-10-31 DIAGNOSIS — M70.72 ISCHIAL BURSITIS OF LEFT SIDE: Primary | ICD-10-CM

## 2024-10-31 NOTE — PROGRESS NOTES
Here today for left ischial tuberosity bursa injection requested by Dr. An    - Injection Tendon or Ligament    Date/Time: 10/31/2024 5:09 PM    Performed by: Stephon Mejia MD  Authorized by: Stephon Mejia MD  Consent: Verbal consent obtained.  Risks and benefits: risks, benefits and alternatives were discussed  Preparation: Patient was prepped and draped in the usual sterile fashion.  Patient tolerance: patient tolerated the procedure well with no immediate complications  Comments: Left ischial tuberosity bursa/hamstring origin  Medications administered: 2 mL lidocaine 1 %, 1 mL triamcinolone acetonide 40 MG/ML  Ultrasound guidance: yes            Diagnoses and all orders for this visit:    Ischial bursitis of left side  -     - Injection Tendon or Ligament    Injection tolerated well.  Patient called a few days prior to the appointment because of significant needle phobia.  I called in a single dose of diazepam 5 mg for him to take prior to the procedure.  He tolerated this well.  He had no difficulty with the procedure.

## 2024-11-02 ENCOUNTER — PATIENT ROUNDING (BHMG ONLY) (OUTPATIENT)
Dept: SPORTS MEDICINE | Facility: CLINIC | Age: 74
End: 2024-11-02
Payer: MEDICARE

## 2024-11-03 NOTE — PROGRESS NOTES
November 2, 2024    A [employer] Message has been sent to the patient for PATIENT R[place]ING [place] [facility]

## 2024-11-14 ENCOUNTER — OFFICE VISIT (OUTPATIENT)
Dept: FAMILY MEDICINE CLINIC | Age: 74
End: 2024-11-14
Payer: MEDICARE

## 2024-11-14 VITALS
DIASTOLIC BLOOD PRESSURE: 80 MMHG | HEART RATE: 80 BPM | TEMPERATURE: 98.2 F | SYSTOLIC BLOOD PRESSURE: 137 MMHG | WEIGHT: 220.2 LBS | BODY MASS INDEX: 31.52 KG/M2 | HEIGHT: 70 IN

## 2024-11-14 DIAGNOSIS — E78.00 HIGH CHOLESTEROL: ICD-10-CM

## 2024-11-14 DIAGNOSIS — Z00.00 MEDICARE ANNUAL WELLNESS VISIT, SUBSEQUENT: Primary | ICD-10-CM

## 2024-11-14 DIAGNOSIS — K21.9 GERD WITHOUT ESOPHAGITIS: ICD-10-CM

## 2024-11-14 DIAGNOSIS — I10 ESSENTIAL HYPERTENSION: ICD-10-CM

## 2024-11-14 PROBLEM — K59.09 CHRONIC CONSTIPATION: Status: RESOLVED | Noted: 2024-05-15 | Resolved: 2024-11-14

## 2024-11-14 PROBLEM — G47.33 OBSTRUCTIVE SLEEP APNEA: Status: ACTIVE | Noted: 2020-09-08

## 2024-11-14 PROBLEM — M25.572 CHRONIC PAIN OF LEFT ANKLE: Status: RESOLVED | Noted: 2023-05-08 | Resolved: 2024-11-14

## 2024-11-14 PROBLEM — G89.29 CHRONIC PAIN OF LEFT ANKLE: Status: RESOLVED | Noted: 2023-05-08 | Resolved: 2024-11-14

## 2024-11-14 PROCEDURE — 1160F RVW MEDS BY RX/DR IN RCRD: CPT | Performed by: FAMILY MEDICINE

## 2024-11-14 PROCEDURE — 1159F MED LIST DOCD IN RCRD: CPT | Performed by: FAMILY MEDICINE

## 2024-11-14 PROCEDURE — G0439 PPPS, SUBSEQ VISIT: HCPCS | Performed by: FAMILY MEDICINE

## 2024-11-14 PROCEDURE — 99214 OFFICE O/P EST MOD 30 MIN: CPT | Performed by: FAMILY MEDICINE

## 2024-11-14 PROCEDURE — 1126F AMNT PAIN NOTED NONE PRSNT: CPT | Performed by: FAMILY MEDICINE

## 2024-11-14 PROCEDURE — 3075F SYST BP GE 130 - 139MM HG: CPT | Performed by: FAMILY MEDICINE

## 2024-11-14 PROCEDURE — 1170F FXNL STATUS ASSESSED: CPT | Performed by: FAMILY MEDICINE

## 2024-11-14 PROCEDURE — 3079F DIAST BP 80-89 MM HG: CPT | Performed by: FAMILY MEDICINE

## 2024-11-14 NOTE — ASSESSMENT & PLAN NOTE
ADVICE GIVEN RE:  SEATBELT USE, ALCOHOL USE, HEALTHY DIET, ROUTINE EYE AND DENTAL EXAM, ROUTINE VACCINATIONS.    DECLINES FLU SHOT AND COVID BOOSTER

## 2024-11-14 NOTE — PROGRESS NOTES
Subjective   The ABCs of the Annual Wellness Visit  Medicare Wellness Visit      Karan Graham is a 74 y.o. patient who presents for a Medicare Wellness Visit.    The following portions of the patient's history were reviewed and   updated as appropriate: allergies, current medications, past family history, past medical history, past social history, past surgical history, and problem list.    Compared to one year ago, the patient's physical   health is the same.  Compared to one year ago, the patient's mental   health is the same.    Recent Hospitalizations:  He was admitted within the past 365 days at [facility].     Current Medical Providers:  Patient Care Team:  Marshal Light MD as PCP - General (Family Medicine)    Outpatient Medications Prior to Visit   Medication Sig Dispense Refill    albuterol sulfate  (90 Base) MCG/ACT inhaler INHALE 2 PUFFS EVERY 6 HOURS AS NEEDED FOR SHORTNESS OF BREATH      amLODIPine (NORVASC) 2.5 MG tablet Take 1 tablet by mouth Daily. 30 tablet 11    aspirin 81 MG chewable tablet Chew 1 tablet Daily.      atorvastatin (LIPITOR) 40 MG tablet Take 1 tablet by mouth Every Night. 90 tablet 3    celecoxib (CeleBREX) 200 MG capsule TAKE 1 CAPSULE BY MOUTH TWICE DAILY WITH FOOD AS NEEDED AS DIRECTED FOR MILD PAIN OR MODERATE PAIN 60 capsule 2    clindamycin (CLEOCIN T) 1 % lotion APPLY A THIN LAYER TO THE AFFECTED AREA ON THE LEFT ARM TWICE DAILY DURING FLARES AND ONCE DAILY FOR MAINTENANCE      clopidogrel (PLAVIX) 75 MG tablet TAKE 1 TABLET BY MOUTH DAILY 90 tablet 1    diclofenac (VOLTAREN) 0.1 % ophthalmic solution PLACE 1 DROP INTO BOTH EYES FOUR TIMES DAILY FOR 30 DAYS      Ibuprofen 3 %, Gabapentin 10 %, Baclofen 2 %, lidocaine 4 %, Ketamine HCl 4 % Apply 1-2 g topically to the appropriate area as directed 3 (Three) to 4 (Four) times daily. 90 g 1    ketoconazole (NIZORAL) 2 % cream Apply  topically to the appropriate area as directed.      Loratadine 10 MG  capsule Take  by mouth.      metoprolol succinate XL (TOPROL-XL) 25 MG 24 hr tablet Take 0.5 tablets by mouth Daily.      nitroglycerin (NITROSTAT) 0.4 MG SL tablet Place 1 tablet under the tongue Every 5 (Five) Minutes As Needed for Chest Pain. Take no more than 3 doses in 15 minutes. 30 tablet 11    pantoprazole (PROTONIX) 40 MG EC tablet TAKE 1 TABLET BY MOUTH EVERY NIGHT 90 tablet 0    tamsulosin (FLOMAX) 0.4 MG capsule 24 hr capsule Take 1 capsule by mouth 2 (Two) Times a Day.      triamcinolone (KENALOG) 0.1 % ointment Apply 1 application topically to the appropriate area as directed 2 (Two) Times a Day. 30 g 2     No facility-administered medications prior to visit.     No opioid medication identified on active medication list. I have reviewed chart for other potential  high risk medication/s and harmful drug interactions in the elderly.      Aspirin is on active medication list. Aspirin use is indicated based on review of current medical condition/s. Pros and cons of this therapy have been discussed today. Benefits of this medication outweigh potential harm.  Patient has been encouraged to continue taking this medication.  .      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  ACP discussion was held with the patient during this visit. Patient has an advance directive (not in EMR), copy requested.            Objective  "  Vitals:    24 1024   BP: 137/80   BP Location: Left arm   Patient Position: Sitting   Pulse: 80   Temp: 98.2 °F (36.8 °C)   TempSrc: Oral   Weight: 99.9 kg (220 lb 3.2 oz)   Height: 177.8 cm (70\")   PainSc: 0-No pain       Estimated body mass index is 31.6 kg/m² as calculated from the following:    Height as of this encounter: 177.8 cm (70\").    Weight as of this encounter: 99.9 kg (220 lb 3.2 oz).            Does the patient have evidence of cognitive impairment? No                                                                                                Health  Risk Assessment    Smoking Status:  Social History     Tobacco Use   Smoking Status Former    Types: Pipe    Passive exposure: Past   Smokeless Tobacco Never   Tobacco Comments    Very little     Alcohol Consumption:  Social History     Substance and Sexual Activity   Alcohol Use Yes    Comment: Don’t drink weekly       Fall Risk Screen  STEADI Fall Risk Assessment was completed, and patient is at LOW risk for falls.Assessment completed on:2024    Depression Screening   Little interest or pleasure in doing things? Not at all   Feeling down, depressed, or hopeless? Not at all   PHQ-2 Total Score 0      Health Habits and Functional and Cognitive Screenin/14/2024    10:23 AM   Functional & Cognitive Status   Do you have difficulty preparing food and eating? No   Do you have difficulty bathing yourself, getting dressed or grooming yourself? No   Do you have difficulty using the toilet? No   Do you have difficulty moving around from place to place? No   Do you have trouble with steps or getting out of a bed or a chair? No   Current Diet Well Balanced Diet   Dental Exam Not up to date   Eye Exam Up to date   Exercise (times per week) 7 times per week   Current Exercises Include Other   Do you need help using the phone?  No   Are you deaf or do you have serious difficulty hearing?  No   Do you need help to go to places out of walking " distance? No   Do you need help shopping? No   Do you need help preparing meals?  No   Do you need help with housework?  No   Do you need help with laundry? No   Do you need help taking your medications? No   Do you need help managing money? No   Do you ever drive or ride in a car without wearing a seat belt? No   Have you felt unusual stress, anger or loneliness in the last month? No   Who do you live with? Spouse   If you need help, do you have trouble finding someone available to you? No   Have you been bothered in the last four weeks by sexual problems? No   Do you have difficulty concentrating, remembering or making decisions? No           Age-appropriate Screening Schedule:  Refer to the list below for future screening recommendations based on patient's age, sex and/or medical conditions. Orders for these recommended tests are listed in the plan section. The patient has been provided with a written plan.    Health Maintenance List  Health Maintenance   Topic Date Due    ZOSTER VACCINE (1 of 2) 11/14/2024 (Originally 5/12/2002)    COVID-19 Vaccine (5 - 2024-25 season) 11/16/2024 (Originally 9/1/2024)    INFLUENZA VACCINE  03/31/2025 (Originally 8/1/2024)    TDAP/TD VACCINES (1 - Tdap) 11/14/2025 (Originally 5/12/1969)    LIPID PANEL  05/15/2025    BMI FOLLOWUP  10/24/2025    ANNUAL WELLNESS VISIT  11/14/2025    COLORECTAL CANCER SCREENING  10/02/2033    HEPATITIS C SCREENING  Completed    Pneumococcal Vaccine 65+  Completed    AAA SCREEN (ONE-TIME)  Completed                                                                                                                                                CMS Preventative Services Quick Reference  Risk Factors Identified During Encounter  None Identified    The above risks/problems have been discussed with the patient.  Pertinent information has been shared with the patient in the After Visit Summary.  An After Visit Summary and PPPS were made available to the  "patient.    Follow Up:   Next Medicare Wellness visit to be scheduled in 1 year.         Additional E&M Note during same encounter follows:  Patient has additional, significant, and separately identifiable condition(s)/problem(s) that require work above and beyond the Medicare Wellness Visit     Chief Complaint  Medicare Wellness-subsequent    Subjective   --TOLERATING BLOOD PRESSURE MEDICATION WITHOUT APPARENT SIDE EFFECTS  --GERD IS WELL CONTROLLED WITH THE PPI  --LAST LIPIDS WERE OK, NO ISSUES WITH THE STATIN      Karan is also being seen today for additional medical problem/s.    Review of Systems   Constitutional:  Negative for activity change, appetite change, chills, fatigue and fever.   HENT:  Negative for congestion, ear pain, hearing loss, rhinorrhea and sore throat.    Eyes:  Negative for discharge and visual disturbance.   Respiratory:  Negative for cough and shortness of breath.    Cardiovascular:  Negative for chest pain, palpitations and leg swelling.   Gastrointestinal:  Negative for abdominal pain, nausea and vomiting.   Genitourinary:  Negative for dysuria and hematuria.   Musculoskeletal:  Positive for arthralgias. Negative for myalgias.   Psychiatric/Behavioral:  Negative for dysphoric mood.               Objective   Vital Signs:  /80 (BP Location: Left arm, Patient Position: Sitting)   Pulse 80   Temp 98.2 °F (36.8 °C) (Oral)   Ht 177.8 cm (70\")   Wt 99.9 kg (220 lb 3.2 oz)   BMI 31.60 kg/m²   Physical Exam  Vitals and nursing note reviewed.   Constitutional:       General: He is not in acute distress.     Appearance: Normal appearance.   HENT:      Right Ear: Tympanic membrane normal.      Left Ear: Tympanic membrane normal.      Mouth/Throat:      Pharynx: Oropharynx is clear.   Eyes:      Conjunctiva/sclera: Conjunctivae normal.   Cardiovascular:      Rate and Rhythm: Normal rate and regular rhythm.      Heart sounds: Normal heart sounds. No murmur heard.  Pulmonary:      Effort: " Pulmonary effort is normal.      Breath sounds: Normal breath sounds.   Abdominal:      General: Bowel sounds are normal.      Palpations: Abdomen is soft.      Tenderness: There is no abdominal tenderness.   Musculoskeletal:      Cervical back: Neck supple.      Right lower leg: No edema.      Left lower leg: No edema.   Lymphadenopathy:      Cervical: No cervical adenopathy.   Neurological:      General: No focal deficit present.      Mental Status: He is alert.      Cranial Nerves: No cranial nerve deficit.      Coordination: Coordination normal.      Gait: Gait normal.   Psychiatric:         Mood and Affect: Mood normal.         Behavior: Behavior normal.                       Assessment and Plan            Essential hypertension  Hypertension is stable and controlled  Continue current treatment regimen.  Blood pressure will be reassessed in 6 months.         GERD without esophagitis  IMPROVED WITH CURRENT TREATMENT, CONTINUE SAME, WILL REEVALUATE AT NEXT VISIT        High cholesterol   Lipid abnormalities are stable    Plan:  Continue same medication/s without change.      Discussed medication dosage, use, side effects, and goals of treatment in detail.    Counseled patient on lifestyle modifications to help control hyperlipidemia.     Patient Treatment Goals:   LDL goal is less than 70    Followup in 6 months.         Medicare annual wellness visit, subsequent  ADVICE GIVEN RE:  SEATBELT USE, ALCOHOL USE, HEALTHY DIET, ROUTINE EYE AND DENTAL EXAM, ROUTINE VACCINATIONS.    DECLINES FLU SHOT AND COVID BOOSTER                  Follow Up   Return in about 6 months (around 5/14/2025).  Patient was given instructions and counseling regarding his condition or for health maintenance advice. Please see specific information pulled into the AVS if appropriate.

## 2024-11-18 RX ORDER — CLOPIDOGREL BISULFATE 75 MG/1
75 TABLET ORAL DAILY
Qty: 90 TABLET | Refills: 1 | Status: SHIPPED | OUTPATIENT
Start: 2024-11-18

## 2024-11-22 RX ORDER — PANTOPRAZOLE SODIUM 40 MG/1
40 TABLET, DELAYED RELEASE ORAL NIGHTLY
Qty: 90 TABLET | Refills: 1 | Status: SHIPPED | OUTPATIENT
Start: 2024-11-22

## 2025-01-14 ENCOUNTER — OFFICE (OUTPATIENT)
Dept: URBAN - METROPOLITAN AREA CLINIC 76 | Facility: CLINIC | Age: 75
End: 2025-01-14
Payer: MEDICARE

## 2025-01-14 VITALS
WEIGHT: 216 LBS | SYSTOLIC BLOOD PRESSURE: 123 MMHG | DIASTOLIC BLOOD PRESSURE: 72 MMHG | HEIGHT: 70 IN | HEART RATE: 65 BPM

## 2025-01-14 DIAGNOSIS — K59.00 CONSTIPATION, UNSPECIFIED: ICD-10-CM

## 2025-01-14 DIAGNOSIS — K21.9 GASTRO-ESOPHAGEAL REFLUX DISEASE WITHOUT ESOPHAGITIS: ICD-10-CM

## 2025-01-14 DIAGNOSIS — R10.84 GENERALIZED ABDOMINAL PAIN: ICD-10-CM

## 2025-01-14 PROCEDURE — 99213 OFFICE O/P EST LOW 20 MIN: CPT

## 2025-01-20 ENCOUNTER — TELEPHONE (OUTPATIENT)
Age: 75
End: 2025-01-20
Payer: MEDICARE

## 2025-01-20 NOTE — TELEPHONE ENCOUNTER
Can you let him know I do not see any medications that he is on that would particularly give him constipation.

## 2025-01-20 NOTE — TELEPHONE ENCOUNTER
Patient is having some constipation. He would like to know, could any of his medication that we prescribed can cause this? Patient did have an apt today, but had to cancel due the weather in [place].     He can be reached at [phone]

## 2025-01-22 ENCOUNTER — TRANSCRIBE ORDERS (OUTPATIENT)
Dept: ADMINISTRATIVE | Facility: HOSPITAL | Age: 75
End: 2025-01-22
Payer: MEDICARE

## 2025-01-22 DIAGNOSIS — K59.00 CONSTIPATION, UNSPECIFIED CONSTIPATION TYPE: ICD-10-CM

## 2025-01-22 DIAGNOSIS — R10.9 STOMACH ACHE: Primary | ICD-10-CM

## 2025-01-22 DIAGNOSIS — R10.84 ABDOMINAL PAIN, GENERALIZED: ICD-10-CM

## 2025-01-22 DIAGNOSIS — K21.9 CHALASIA OF LOWER ESOPHAGEAL SPHINCTER: ICD-10-CM

## 2025-01-29 ENCOUNTER — TRANSCRIBE ORDERS (OUTPATIENT)
Dept: ADMINISTRATIVE | Facility: HOSPITAL | Age: 75
End: 2025-01-29
Payer: MEDICARE

## 2025-01-29 ENCOUNTER — LAB (OUTPATIENT)
Dept: LAB | Facility: HOSPITAL | Age: 75
End: 2025-01-29
Payer: MEDICARE

## 2025-01-29 DIAGNOSIS — N40.1 BENIGN PROSTATIC HYPERPLASIA WITH LOWER URINARY TRACT SYMPTOMS, SYMPTOM DETAILS UNSPECIFIED: ICD-10-CM

## 2025-01-29 DIAGNOSIS — N40.1 BENIGN PROSTATIC HYPERPLASIA WITH LOWER URINARY TRACT SYMPTOMS, SYMPTOM DETAILS UNSPECIFIED: Primary | ICD-10-CM

## 2025-01-29 LAB — PSA SERPL-MCNC: 0.88 NG/ML (ref 0–4)

## 2025-01-29 PROCEDURE — 36415 COLL VENOUS BLD VENIPUNCTURE: CPT

## 2025-01-29 PROCEDURE — 84153 ASSAY OF PSA TOTAL: CPT

## 2025-01-30 ENCOUNTER — HOSPITAL ENCOUNTER (OUTPATIENT)
Dept: CT IMAGING | Facility: HOSPITAL | Age: 75
Discharge: HOME | End: 2025-01-30
Payer: MEDICARE

## 2025-01-30 DIAGNOSIS — R10.9 STOMACH ACHE: ICD-10-CM

## 2025-01-30 DIAGNOSIS — R10.84 ABDOMINAL PAIN, GENERALIZED: ICD-10-CM

## 2025-01-30 DIAGNOSIS — K59.00 CONSTIPATION, UNSPECIFIED CONSTIPATION TYPE: ICD-10-CM

## 2025-01-30 DIAGNOSIS — K21.9 CHALASIA OF LOWER ESOPHAGEAL SPHINCTER: ICD-10-CM

## 2025-01-30 PROCEDURE — 25510000001 IOPAMIDOL PER 1 ML

## 2025-01-30 PROCEDURE — 74177 CT ABD & PELVIS W/CONTRAST: CPT

## 2025-01-30 RX ORDER — IOPAMIDOL 755 MG/ML
100 INJECTION, SOLUTION INTRAVASCULAR
Status: COMPLETED | OUTPATIENT
Start: 2025-01-30 | End: 2025-01-30

## 2025-01-30 RX ADMIN — IOPAMIDOL 100 ML: 755 INJECTION, SOLUTION INTRAVENOUS at 11:36

## 2025-02-11 ENCOUNTER — OFFICE VISIT (OUTPATIENT)
Dept: FAMILY MEDICINE CLINIC | Age: 75
End: 2025-02-11
Payer: MEDICARE

## 2025-02-11 ENCOUNTER — HOSPITAL ENCOUNTER (OUTPATIENT)
Dept: GENERAL RADIOLOGY | Facility: HOSPITAL | Age: 75
Discharge: HOME | End: 2025-02-11
Admitting: NURSE PRACTITIONER
Payer: MEDICARE

## 2025-02-11 VITALS
HEART RATE: 82 BPM | TEMPERATURE: 97.5 F | OXYGEN SATURATION: 98 % | WEIGHT: 225 LBS | HEIGHT: 70 IN | BODY MASS INDEX: 32.21 KG/M2 | DIASTOLIC BLOOD PRESSURE: 80 MMHG | SYSTOLIC BLOOD PRESSURE: 136 MMHG

## 2025-02-11 DIAGNOSIS — R07.9 CHEST PAIN, UNSPECIFIED TYPE: ICD-10-CM

## 2025-02-11 DIAGNOSIS — M79.642 LEFT HAND PAIN: Primary | ICD-10-CM

## 2025-02-11 DIAGNOSIS — M79.642 LEFT HAND PAIN: ICD-10-CM

## 2025-02-11 PROCEDURE — 3075F SYST BP GE 130 - 139MM HG: CPT | Performed by: NURSE PRACTITIONER

## 2025-02-11 PROCEDURE — 3079F DIAST BP 80-89 MM HG: CPT | Performed by: NURSE PRACTITIONER

## 2025-02-11 PROCEDURE — 93005 ELECTROCARDIOGRAM TRACING: CPT | Performed by: NURSE PRACTITIONER

## 2025-02-11 PROCEDURE — 1159F MED LIST DOCD IN RCRD: CPT | Performed by: NURSE PRACTITIONER

## 2025-02-11 PROCEDURE — 1125F AMNT PAIN NOTED PAIN PRSNT: CPT | Performed by: NURSE PRACTITIONER

## 2025-02-11 PROCEDURE — 73130 X-RAY EXAM OF HAND: CPT

## 2025-02-11 PROCEDURE — 99214 OFFICE O/P EST MOD 30 MIN: CPT | Performed by: NURSE PRACTITIONER

## 2025-02-11 PROCEDURE — 1160F RVW MEDS BY RX/DR IN RCRD: CPT | Performed by: NURSE PRACTITIONER

## 2025-02-11 RX ORDER — CIPROFLOXACIN 500 MG/1
TABLET, FILM COATED ORAL
COMMUNITY
Start: 2025-02-04

## 2025-02-11 RX ORDER — METRONIDAZOLE 500 MG/1
TABLET ORAL
COMMUNITY
Start: 2025-02-04

## 2025-02-11 NOTE — ASSESSMENT & PLAN NOTE
No change in EKG compared to previous.  Advise caution with shoveling snow and follow-up for any persisting concerns.

## 2025-02-11 NOTE — ASSESSMENT & PLAN NOTE
Further treatment recommendations pending EKG results.  Written order for left hand splint provided to patient.  [facility] to get left hand splint.  Pain currently under control.  May need to consider tramadol as needed if has a fracture.

## 2025-02-11 NOTE — PROGRESS NOTES
Please advise no fracture.  Osteoarthritis noted.  Apply cold and wear brace.  Follow up if not improving.  Should show gradual improvement over the next couple of weeks.

## 2025-02-11 NOTE — PROGRESS NOTES
"Chief Complaint  Hand Pain (Patient c/o left hand injury 2 days ago working on tractor and wrench slipped and busted his knuckle )    Subjective          Karan Graham presents to [facility]     Patient is a 74-year-old male who is here today regarding left hand pain that started 2 days ago.  He injured the left hand while working on a tractor.  The wrench slipped and hit the knuckle of the second and third fingers.  Swelling is noted but improving.  He rarely takes Celebrex due to his history of heart disease and cardiac stents.  He did take two 500 mg Tylenol yesterday.  Denies pain if it rest but will get some intermittent significant pain with movement.    Patient also reports that he was shoveling snow this morning and experienced some chest pain and took a nitroglycerin tablet about 30 minutes ago with some relief.  He was scheduled to have routine follow-up with cardiology this morning but due to weather and hand pain he will not be able to make that appointment.     Objective   Vital Signs:   Vitals:    02/11/25 1043   BP: 136/80   BP Location: Left arm   Patient Position: Sitting   Cuff Size: Adult   Pulse: 82   Temp: 97.5 °F (36.4 °C)   TempSrc: Temporal   SpO2: 98%   Weight: 102 kg (225 lb)   Height: 177.8 cm (70\")   PainSc:   9   PainLoc: Hand  Comment: left       Wt Readings from Last 3 Encounters:   02/11/25 102 kg (225 lb)   11/14/24 99.9 kg (220 lb 3.2 oz)   10/31/24 101 kg (223 lb)      BP Readings from Last 3 Encounters:   02/11/25 136/80   11/14/24 137/80   10/31/24 120/68       Body mass index is 32.28 kg/m².             Physical Exam  Vitals reviewed.   Constitutional:       General: He is not in acute distress.     Appearance: Normal appearance. He is well-developed.   Cardiovascular:      Rate and Rhythm: Normal rate and regular rhythm.      Heart sounds: Normal heart sounds.   Pulmonary:      Effort: Pulmonary effort is normal.      Breath sounds: Normal " breath sounds.   Musculoskeletal:        Hands:       Right lower leg: No edema.      Left lower leg: No edema.   Skin:     General: Skin is warm and dry.   Neurological:      General: No focal deficit present.      Mental Status: He is alert.   Psychiatric:         Attention and Perception: Attention normal.         Mood and Affect: Mood and affect normal.         Behavior: Behavior normal.           Current Outpatient Medications:     albuterol sulfate  (90 Base) MCG/ACT inhaler, INHALE 2 PUFFS EVERY 6 HOURS AS NEEDED FOR SHORTNESS OF BREATH, Disp: , Rfl:     amLODIPine (NORVASC) 2.5 MG tablet, Take 1 tablet by mouth Daily., Disp: 30 tablet, Rfl: 11    aspirin 81 MG chewable tablet, Chew 1 tablet Daily., Disp: , Rfl:     atorvastatin (LIPITOR) 40 MG tablet, Take 1 tablet by mouth Every Night., Disp: 90 tablet, Rfl: 3    celecoxib (CeleBREX) 200 MG capsule, TAKE 1 CAPSULE BY MOUTH TWICE DAILY WITH FOOD AS NEEDED AS DIRECTED FOR MILD PAIN OR MODERATE PAIN, Disp: 60 capsule, Rfl: 2    ciprofloxacin (CIPRO) 500 MG tablet, , Disp: , Rfl:     clindamycin (CLEOCIN T) 1 % lotion, APPLY A THIN LAYER TO THE AFFECTED AREA ON THE LEFT ARM TWICE DAILY DURING FLARES AND ONCE DAILY FOR MAINTENANCE, Disp: , Rfl:     clopidogrel (PLAVIX) 75 MG tablet, TAKE 1 TABLET BY MOUTH DAILY, Disp: 90 tablet, Rfl: 1    diclofenac (VOLTAREN) 0.1 % ophthalmic solution, PLACE 1 DROP INTO BOTH EYES FOUR TIMES DAILY FOR 30 DAYS, Disp: , Rfl:     Ibuprofen 3 %, Gabapentin 10 %, Baclofen 2 %, lidocaine 4 %, Ketamine HCl 4 %, Apply 1-2 g topically to the appropriate area as directed 3 (Three) to 4 (Four) times daily., Disp: 90 g, Rfl: 1    ketoconazole (NIZORAL) 2 % cream, Apply  topically to the appropriate area as directed., Disp: , Rfl:     Loratadine 10 MG capsule, Take  by mouth., Disp: , Rfl:     metoprolol succinate XL (TOPROL-XL) 25 MG 24 hr tablet, Take 0.5 tablets by mouth Daily., Disp: , Rfl:     metroNIDAZOLE (FLAGYL) 500 MG  tablet, , Disp: , Rfl:     nitroglycerin (NITROSTAT) 0.4 MG SL tablet, Place 1 tablet under the tongue Every 5 (Five) Minutes As Needed for Chest Pain. Take no more than 3 doses in 15 minutes., Disp: 30 tablet, Rfl: 11    pantoprazole (PROTONIX) 40 MG EC tablet, TAKE 1 TABLET BY MOUTH EVERY NIGHT, Disp: 90 tablet, Rfl: 1    tamsulosin (FLOMAX) 0.4 MG capsule 24 hr capsule, Take 1 capsule by mouth 2 (Two) Times a Day., Disp: , Rfl:     triamcinolone (KENALOG) 0.1 % ointment, Apply 1 application topically to the appropriate area as directed 2 (Two) Times a Day., Disp: 30 g, Rfl: 2   Past Medical History:   Diagnosis Date    Arthritis of back     Years ago    Asthma     CAD (coronary artery disease)     hx stents june 2020    Callus     Clotting disorder     Blood thinners    Colon polyp Years ago    Diverticulosis     Fracture, clavicle     GERD (gastroesophageal reflux disease) Many years ago    Hypertension     Knee swelling Years ago    Low back pain     Low back strain     Lumbosacral disc disease     Other allergic rhinitis 11/03/2022    Pancreas (digestive gland) works poorly     Rotator cuff syndrome     Skin cancer     Sleep apnea      Allergies   Allergen Reactions    Codeine Diarrhea and Nausea And Vomiting    Ibuprofen Nausea And Vomiting               Result Review :     Common labs          4/26/2024    10:59 5/15/2024    10:14 1/29/2025    14:06   Common Labs   Glucose 95      BUN 14      Creatinine 1.02      Sodium 140      Potassium 4.3      Chloride 106      Calcium 9.1      Albumin 4.6      Total Bilirubin 0.9      Alkaline Phosphatase 84      AST (SGOT) 22      ALT (SGPT) 21      WBC 6.10      Hemoglobin 14.0      Hematocrit 41.4      Platelets 158      Total Cholesterol  97     Triglycerides  99     HDL Cholesterol  33     LDL Cholesterol   45     PSA  0.688  0.882         CT Abdomen Pelvis With Contrast    Result Date: 2/3/2025  Impression: 1.Extensive distal descending and proximal sigmoid  diverticulosis with mild wall thickening and a small amount of free fluid in the pelvis. Diverticulitis can have this appearance. No evidence of perforation or abscess. The CT appearance of the GI tract otherwise is unremarkable. 2.Other incidental ancillary findings are described above. Electronically Signed: Pratik DO Alex  2/3/2025 8:26 AM EST  Workstation ID: [id number]           ECG 12 Lead    Date/Time: 2/11/2025 11:22 AM  Performed by: Gabi Khan APRN    Authorized by: Gabi Khan APRN  Comparison: compared with previous ECG from 3/26/2024  Similar to previous ECG  Rhythm: sinus rhythm  Rate: normal  BPM: 74  Conduction: right bundle branch block  QRS axis: normal  Other: no other findings    Clinical impression: abnormal EKG         Social History     Tobacco Use   Smoking Status Former    Types: Pipe    Passive exposure: Past   Smokeless Tobacco Never   Tobacco Comments    Very little           Assessment and Plan    Diagnoses and all orders for this visit:    1. Left hand pain (Primary)  Assessment & Plan:  Further treatment recommendations pending EKG results.  Written order for left hand splint provided to patient.  [facility] to get left hand splint.  Pain currently under control.  May need to consider tramadol as needed if has a fracture.    Orders:  -     XR Hand 3+ View Left; Future    2. Chest pain, unspecified type  Assessment & Plan:  No change in EKG compared to previous.  Advise caution with shoveling snow and follow-up for any persisting concerns.    Orders:  -     ECG 12 Lead        Follow Up    No follow-ups on file.  Patient was given instructions and counseling regarding his condition or for health maintenance advice. Please see specific information pulled into the AVS if appropriate.

## 2025-02-23 NOTE — PLAN OF CARE
Problem: Patient Care Overview (Adult)  Goal: Plan of Care Review  Outcome: Ongoing (interventions implemented as appropriate)    03/22/17 8055   Coping/Psychosocial Response Interventions   Plan Of Care Reviewed With patient   Patient Care Overview   Progress progress toward functional goals as expected   Outcome Evaluation   Outcome Summary/Follow up Plan pt admited from ER VSS assessment completed no complaints of pain will continue to monitor.            
Problem: Patient Care Overview (Adult)  Goal: Plan of Care Review  Outcome: Ongoing (interventions implemented as appropriate)    03/23/17 0106   Coping/Psychosocial Response Interventions   Plan Of Care Reviewed With patient   Patient Care Overview   Progress improving         Problem: Anxiety (Adult)  Goal: Identify Related Risk Factors and Signs and Symptoms  Outcome: Ongoing (interventions implemented as appropriate)    03/23/17 0106   Anxiety   Related Risk Factors (Anxiety) environment change;health status change;knowledge deficit;loss of control;pain   Signs and Symptoms (Anxiety) apprehension/being worried;dependence increased;physical complaints/symptoms       Goal: Reduction/Resolution  Outcome: Ongoing (interventions implemented as appropriate)    03/23/17 0106   Anxiety (Adult)   Reduction/Resolution making progress toward outcome         Problem: Pain, Acute (Adult)  Goal: Identify Related Risk Factors and Signs and Symptoms  Outcome: Ongoing (interventions implemented as appropriate)  Goal: Acceptable Pain Control/Comfort Level  Outcome: Ongoing (interventions implemented as appropriate)    03/23/17 0106   Pain, Acute (Adult)   Acceptable Pain Control/Comfort Level making progress toward outcome           
Problem: Patient Care Overview (Adult)  Goal: Plan of Care Review  Outcome: Ongoing (interventions implemented as appropriate)    03/23/17 1421   Coping/Psychosocial Response Interventions   Plan Of Care Reviewed With patient   Patient Care Overview   Progress improving   Outcome Evaluation   Outcome Summary/Follow up Plan C/o headache, productive cough. No other pain       Goal: Adult Individualization and Mutuality  Outcome: Ongoing (interventions implemented as appropriate)  Goal: Discharge Needs Assessment  Outcome: Ongoing (interventions implemented as appropriate)    Problem: Anxiety (Adult)  Goal: Identify Related Risk Factors and Signs and Symptoms  Outcome: Ongoing (interventions implemented as appropriate)  Goal: Reduction/Resolution  Outcome: Ongoing (interventions implemented as appropriate)    Problem: Pain, Acute (Adult)  Goal: Identify Related Risk Factors and Signs and Symptoms  Outcome: Ongoing (interventions implemented as appropriate)  Goal: Acceptable Pain Control/Comfort Level  Outcome: Ongoing (interventions implemented as appropriate)      
Problem: Patient Care Overview (Adult)  Goal: Plan of Care Review  Outcome: Ongoing (interventions implemented as appropriate)    03/24/17 0254   Coping/Psychosocial Response Interventions   Plan Of Care Reviewed With patient   Patient Care Overview   Progress improving   Outcome Evaluation   Outcome Summary/Follow up Plan Anticipating discharge today. Continuing to monitor VS and labs.       Goal: Adult Individualization and Mutuality  Outcome: Ongoing (interventions implemented as appropriate)  Goal: Discharge Needs Assessment  Outcome: Ongoing (interventions implemented as appropriate)    Problem: Anxiety (Adult)  Goal: Identify Related Risk Factors and Signs and Symptoms  Outcome: Ongoing (interventions implemented as appropriate)  Goal: Reduction/Resolution  Outcome: Ongoing (interventions implemented as appropriate)    Problem: Pain, Acute (Adult)  Goal: Identify Related Risk Factors and Signs and Symptoms  Outcome: Outcome(s) achieved Date Met:  03/24/17  Goal: Acceptable Pain Control/Comfort Level  Outcome: Ongoing (interventions implemented as appropriate)      
Problem: Patient Care Overview (Adult)  Goal: Plan of Care Review  Outcome: Ongoing (interventions implemented as appropriate)    03/24/17 1112   Outcome Evaluation   Outcome Summary/Follow up Plan Echo today and possible discharge       Goal: Adult Individualization and Mutuality  Outcome: Ongoing (interventions implemented as appropriate)  Goal: Discharge Needs Assessment  Outcome: Ongoing (interventions implemented as appropriate)    Problem: Anxiety (Adult)  Goal: Identify Related Risk Factors and Signs and Symptoms  Outcome: Ongoing (interventions implemented as appropriate)  Goal: Reduction/Resolution  Outcome: Ongoing (interventions implemented as appropriate)    Problem: Pain, Acute (Adult)  Goal: Acceptable Pain Control/Comfort Level  Outcome: Ongoing (interventions implemented as appropriate)      
nasal cannula

## 2025-02-25 RX ORDER — ATORVASTATIN CALCIUM 40 MG/1
40 TABLET, FILM COATED ORAL NIGHTLY
Qty: 90 TABLET | Refills: 3 | Status: SHIPPED | OUTPATIENT
Start: 2025-02-25

## 2025-03-03 ENCOUNTER — TELEPHONE (OUTPATIENT)
Dept: CARDIOLOGY | Facility: CLINIC | Age: 75
End: 2025-03-03

## 2025-03-03 ENCOUNTER — OFFICE VISIT (OUTPATIENT)
Dept: CARDIOLOGY | Facility: CLINIC | Age: 75
End: 2025-03-03
Payer: MEDICARE

## 2025-03-03 VITALS
HEART RATE: 70 BPM | WEIGHT: 226 LBS | SYSTOLIC BLOOD PRESSURE: 122 MMHG | DIASTOLIC BLOOD PRESSURE: 80 MMHG | HEIGHT: 70 IN | BODY MASS INDEX: 32.35 KG/M2

## 2025-03-03 DIAGNOSIS — I25.118 CORONARY ARTERY DISEASE OF NATIVE ARTERY OF NATIVE HEART WITH STABLE ANGINA PECTORIS: Primary | ICD-10-CM

## 2025-03-03 DIAGNOSIS — E78.00 HIGH CHOLESTEROL: ICD-10-CM

## 2025-03-03 DIAGNOSIS — I10 ESSENTIAL HYPERTENSION: ICD-10-CM

## 2025-03-03 DIAGNOSIS — R06.09 DYSPNEA ON EXERTION: ICD-10-CM

## 2025-03-03 PROCEDURE — 93000 ELECTROCARDIOGRAM COMPLETE: CPT | Performed by: NURSE PRACTITIONER

## 2025-03-03 PROCEDURE — 1159F MED LIST DOCD IN RCRD: CPT | Performed by: NURSE PRACTITIONER

## 2025-03-03 PROCEDURE — 3074F SYST BP LT 130 MM HG: CPT | Performed by: NURSE PRACTITIONER

## 2025-03-03 PROCEDURE — 99214 OFFICE O/P EST MOD 30 MIN: CPT | Performed by: NURSE PRACTITIONER

## 2025-03-03 PROCEDURE — 1160F RVW MEDS BY RX/DR IN RCRD: CPT | Performed by: NURSE PRACTITIONER

## 2025-03-03 PROCEDURE — 3079F DIAST BP 80-89 MM HG: CPT | Performed by: NURSE PRACTITIONER

## 2025-03-03 NOTE — PROGRESS NOTES
"  Date of Office Visit: 2025  Encounter Provider: AMINAH Drummond  Place of Service: [facility]  Patient Name: Karan Graham  :1950    Chief Complaint   Patient presents with    Coronary Artery Disease   :     HPI: Karan Graham is a 74 y.o. male patient of Dr. Pedroza's with hypertension, hyperlipidemia, and coronary artery disease.    In 2024, he was seen in the office by Dr. Pedroza with complaints of episodic chest pain and shortness of breath.  He had undergone a fairly extensive workup for the symptoms already including a echocardiograms, stress tests and a repeat cardiac catheterization.  The cardiac catheterization was in 2023 demonstrating no significant coronary disease, patent stents in the proximal to mid LAD, distal RCA, and PDA, and small vessel disease in the very distal circumflex.  He was started on low-dose isosorbide.  Unfortunately he did not tolerate the isosorbide secondary to headaches.  He was also unable to tolerate Ranexa.    He was last seen in the office by Madeline in October at which time there had not been a significant change.  He continued reporting stable angina.  He did report erectile dysfunction for which Madeline decreased to the Toprol.  Otherwise, no changes were made to his regimen.  He is here today for follow-up.    He is not feeling well.  He reports continued exertional dyspnea and chest pain.  He actually went to the hospital in [place] last week for the symptoms.  Workup was unremarkable.  He denies any palpitations, edema, dizziness, syncope, bleeding difficulties or melena.  He tells me he is currently being treated for \"a colon infection.\"    Past Medical History:   Diagnosis Date    Arthritis of back     Years ago    Asthma     CAD (coronary artery disease)     hx stents 2020    Callus     Clotting disorder     Blood thinners    Colon polyp Years ago    Diverticulosis     Fracture, " clavicle     GERD (gastroesophageal reflux disease) Many years ago    Hypertension     Knee swelling Years ago    Low back pain     Low back strain     Lumbosacral disc disease     Other allergic rhinitis 11/03/2022    Pancreas (digestive gland) works poorly     Rotator cuff syndrome     Skin cancer     Sleep apnea        Past Surgical History:   Procedure Laterality Date    BACK SURGERY      CARDIAC CATHETERIZATION  06/2020    CARDIAC CATHETERIZATION N/A 02/21/2023    Procedure: Left Heart Cath;  Surgeon: Eduard Pedroza MD;  Location:  [facility];  Service: Cardiovascular;  Laterality: N/A;    CARDIAC CATHETERIZATION N/A 02/21/2023    Procedure: Coronary angiography;  Surgeon: Eduard Pedroza MD;  Location:  [facility];  Service: Cardiovascular;  Laterality: N/A;    CHOLECYSTECTOMY      COLONOSCOPY  2020    NORMAL    COLONOSCOPY  2023    POLYPS    CORONARY ANGIOPLASTY WITH STENT PLACEMENT      TWICE    CORONARY STENT PLACEMENT      FRACTURE SURGERY  Various    HAND SURGERY      Years ago    HEMORRHOIDECTOMY      PANCREAS SURGERY      STENTING    SHOULDER SURGERY      SINUS SURGERY  2022    Deviated septum    TOE SURGERY Right     big toe    TONSILLECTOMY      TRIGGER POINT INJECTION      TUMOR REMOVAL      Roof Of Mouth       Social History     Socioeconomic History    Marital status:    Tobacco Use    Smoking status: Former     Types: Pipe     Passive exposure: Past    Smokeless tobacco: Never    Tobacco comments:     Very little   Vaping Use    Vaping status: Never Used   Substance and Sexual Activity    Alcohol use: Yes     Comment: Don’t drink weekly    Drug use: No    Sexual activity: Yes     Partners: Female     Birth control/protection: Tubal ligation       Family History   Problem Relation Age of Onset    Hypertension Mother     Leukemia Mother     No Known Problems Father     Hypertension Sister     Heart disease Brother     Cancer Brother         Colon  cancer    Cancer Brother         Prostate    Cancer Brother         Lung cancer    Cancer Brother         Lung cancer    No Known Problems Maternal Aunt     No Known Problems Maternal Uncle     No Known Problems Paternal Aunt     No Known Problems Paternal Uncle     No Known Problems Maternal Grandmother     No Known Problems Maternal Grandfather     No Known Problems Paternal Grandmother     No Known Problems Paternal Grandfather     Cancer Other     Arthritis Brother         Arthritis    Anesthesia problems Neg Hx     Broken bones Neg Hx     Clotting disorder Neg Hx     Collagen disease Neg Hx     Diabetes Neg Hx     Dislocations Neg Hx     Osteoporosis Neg Hx     Rheumatologic disease Neg Hx     Scoliosis Neg Hx     Severe sprains Neg Hx        Review of Systems   Constitutional: Positive for malaise/fatigue.   Cardiovascular:  Positive for chest pain and dyspnea on exertion. Negative for leg swelling, orthopnea, paroxysmal nocturnal dyspnea and syncope.   Respiratory: Negative.     Hematologic/Lymphatic: Negative for bleeding problem.   Musculoskeletal:  Negative for falls.   Gastrointestinal:  Negative for melena.   Neurological:  Negative for dizziness and light-headedness.       Allergies   Allergen Reactions    Isosorbide Headache    Codeine Diarrhea and Nausea And Vomiting    Ibuprofen Nausea And Vomiting         Current Outpatient Medications:     albuterol sulfate  (90 Base) MCG/ACT inhaler, INHALE 2 PUFFS EVERY 6 HOURS AS NEEDED FOR SHORTNESS OF BREATH, Disp: , Rfl:     amLODIPine (NORVASC) 2.5 MG tablet, Take 1 tablet by mouth Daily., Disp: 30 tablet, Rfl: 11    aspirin 81 MG chewable tablet, Chew 1 tablet Daily., Disp: , Rfl:     atorvastatin (LIPITOR) 40 MG tablet, TAKE 1 TABLET BY MOUTH EVERY NIGHT, Disp: 90 tablet, Rfl: 3    celecoxib (CeleBREX) 200 MG capsule, TAKE 1 CAPSULE BY MOUTH TWICE DAILY WITH FOOD AS NEEDED AS DIRECTED FOR MILD PAIN OR MODERATE PAIN, Disp: 60 capsule, Rfl: 2     "ciprofloxacin (CIPRO) 500 MG tablet, , Disp: , Rfl:     clindamycin (CLEOCIN T) 1 % lotion, APPLY A THIN LAYER TO THE AFFECTED AREA ON THE LEFT ARM TWICE DAILY DURING FLARES AND ONCE DAILY FOR MAINTENANCE, Disp: , Rfl:     clopidogrel (PLAVIX) 75 MG tablet, TAKE 1 TABLET BY MOUTH DAILY, Disp: 90 tablet, Rfl: 1    diclofenac (VOLTAREN) 0.1 % ophthalmic solution, PLACE 1 DROP INTO BOTH EYES FOUR TIMES DAILY FOR 30 DAYS, Disp: , Rfl:     Ibuprofen 3 %, Gabapentin 10 %, Baclofen 2 %, lidocaine 4 %, Ketamine HCl 4 %, Apply 1-2 g topically to the appropriate area as directed 3 (Three) to 4 (Four) times daily., Disp: 90 g, Rfl: 1    ketoconazole (NIZORAL) 2 % cream, Apply  topically to the appropriate area as directed., Disp: , Rfl:     Loratadine 10 MG capsule, Take  by mouth., Disp: , Rfl:     metoprolol succinate XL (TOPROL-XL) 25 MG 24 hr tablet, Take 0.5 tablets by mouth Daily., Disp: , Rfl:     metroNIDAZOLE (FLAGYL) 500 MG tablet, , Disp: , Rfl:     nitroglycerin (NITROSTAT) 0.4 MG SL tablet, Place 1 tablet under the tongue Every 5 (Five) Minutes As Needed for Chest Pain. Take no more than 3 doses in 15 minutes., Disp: 30 tablet, Rfl: 11    pantoprazole (PROTONIX) 40 MG EC tablet, TAKE 1 TABLET BY MOUTH EVERY NIGHT, Disp: 90 tablet, Rfl: 1    tamsulosin (FLOMAX) 0.4 MG capsule 24 hr capsule, Take 1 capsule by mouth 2 (Two) Times a Day., Disp: , Rfl:     triamcinolone (KENALOG) 0.1 % ointment, Apply 1 application topically to the appropriate area as directed 2 (Two) Times a Day., Disp: 30 g, Rfl: 2      Objective:     Vitals:    03/03/25 1042   BP: 122/80   Pulse: 70   Weight: 103 kg (226 lb)   Height: 177.8 cm (70\")     Body mass index is 32.43 kg/m².    PHYSICAL EXAM:    Neck:      Vascular: No JVD.   Pulmonary:      Effort: Pulmonary effort is normal.      Breath sounds: Normal breath sounds.   Cardiovascular:      Normal rate. Regular rhythm.      Murmurs: There is no murmur.      No gallop.  No click. No rub. "   Pulses:     Intact distal pulses.           ECG 12 Lead    Date/Time: 3/3/2025 10:53 AM  Performed by: Vicky Aly APRN    Authorized by: Vicky Aly APRN  Comparison: compared with previous ECG from 10/10/2024  Similar to previous ECG  Rhythm: sinus rhythm  Rate: normal  BPM: 70  Conduction: right bundle branch block            Assessment:       Diagnosis Plan   1. Coronary artery disease (stents)  ECG 12 Lead    Stress Test With Myocardial Perfusion One Day      2. Dyspnea on exertion  proBNP    CBC (No Diff)    Comprehensive Metabolic Panel    TSH    XR Chest 2 View      3. Essential hypertension        4. High cholesterol          Orders Placed This Encounter   Procedures    XR Chest 2 View     Standing Status:   Future     Standing Expiration Date:   3/3/2026     Order Specific Question:   Reason for Exam:     Answer:   dyspnea     Order Specific Question:   Release to patient     Answer:   Routine Release [[id number]]    proBNP     Standing Status:   Future     Standing Expiration Date:   3/3/2026     Order Specific Question:   Release to patient     Answer:   Routine Release [[id number]]    CBC (No Diff)     Standing Status:   Future     Standing Expiration Date:   3/3/2026     Order Specific Question:   Release to patient     Answer:   Routine Release [[id number]]    Comprehensive Metabolic Panel     Standing Status:   Future     Standing Expiration Date:   3/3/2026     Order Specific Question:   Release to patient     Answer:   Routine Release [[id number]]    TSH     Standing Status:   Future     Standing Expiration Date:   3/3/2026     Order Specific Question:   Release to patient     Answer:   Routine Release [[id number]]    Stress Test With Myocardial Perfusion One Day     Standing Status:   Future     Standing Expiration Date:   3/3/2026     Order Specific Question:   What stress agent will be used?     Answer:   Exercise with possible pharmacologic     Order Specific Question:    Reason for exam?     Answer:   Chest Pain     Order Specific Question:   Reason for exam?     Answer:   Known Coronary Artery Disease     Order Specific Question:   Release to patient     Answer:   Routine Release [[id number]]    ECG 12 Lead     This order was created via procedure documentation     Order Specific Question:   Release to patient     Answer:   Routine Release [[id number]]          Plan:       1.  Coronary artery disease.  He continues experiencing exertional chest pain and dyspnea.  His last cardiac catheterization was in 2023 at which time he had no significant coronary disease, patent stents in the proximal to mid LAD, distal RCA, and PDA.  He did have small vessel disease in the very distal circumflex for which medical therapy was recommended.  Unfortunately he has been intolerant of isosorbide and Ranexa.  He is tolerating low-dose amlodipine.  Continue aspirin, clopidogrel, and atorvastatin.      2.  Hypertension.  His blood pressure is stable.  Continue current regimen.      3.  Hyperlipidemia.  Continue atorvastatin.      I recommended starting with labs today.  We also discussed the possibility of a chest x-ray which she declined.  Ultimately, I would like to discuss the plan of care with Dr. Pedroza before making any further recommendations.      ADDENDUM: I discussed the plan of care with Dr. Pedroza.  He recommended repeating a stress test.      As always, it has been a pleasure to participate in your patient's care.      Sincerely,         AMINAH Jenkins

## 2025-03-03 NOTE — TELEPHONE ENCOUNTER
----- Message from Eduard Pedroza sent at 3/3/2025  2:54 PM EST -----  We could do a stress just to make sure were not missing anything but his last cath was fairly unremarkable and correlated with his normal stress test    Likely will be normal but never know and with recurrent pain that was worse reasonable to re-stress  ----- Message -----  From: Vicky Aly APRN  Sent: 3/3/2025  12:22 PM EST  To: Eduard Pedroza MD    I saw this patient for follow-up today.    He does have coronary disease.  However, it appears he has a longstanding history of chest pain and shortness of breath that has been extensively evaluated.  His last cardiac catheterization was in 2023 at which time medical therapy was recommended.  He has been intolerant of isosorbide and Ranexa.    He continues reporting symptoms.  He actually went to the hospital in [place] last week.  I do not have these records but I assume everything was stable because he was discharged home.    I recommended starting with labs today.  He has not had any testing in a while so I think this would be reasonable.  I just wanted your thoughts.

## 2025-03-05 ENCOUNTER — LAB (OUTPATIENT)
Dept: LAB | Facility: HOSPITAL | Age: 75
End: 2025-03-05
Payer: MEDICARE

## 2025-03-05 ENCOUNTER — HOSPITAL ENCOUNTER (OUTPATIENT)
Dept: GENERAL RADIOLOGY | Facility: HOSPITAL | Age: 75
Discharge: HOME | End: 2025-03-05
Payer: MEDICARE

## 2025-03-05 ENCOUNTER — TRANSCRIBE ORDERS (OUTPATIENT)
Dept: CARDIOLOGY | Facility: CLINIC | Age: 75
End: 2025-03-05
Payer: MEDICARE

## 2025-03-05 DIAGNOSIS — R06.09 DYSPNEA ON EXERTION: ICD-10-CM

## 2025-03-05 DIAGNOSIS — R06.09 DYSPNEA ON EXERTION: Primary | ICD-10-CM

## 2025-03-05 LAB
ALBUMIN SERPL-MCNC: 4.5 G/DL (ref 3.5–5.2)
ALBUMIN/GLOB SERPL: 1.9 G/DL
ALP SERPL-CCNC: 70 U/L (ref 39–117)
ALT SERPL W P-5'-P-CCNC: 28 U/L (ref 1–41)
ANION GAP SERPL CALCULATED.3IONS-SCNC: 11.9 MMOL/L (ref 5–15)
AST SERPL-CCNC: 31 U/L (ref 1–40)
BILIRUB SERPL-MCNC: 1 MG/DL (ref 0–1.2)
BUN SERPL-MCNC: 11 MG/DL (ref 8–23)
BUN/CREAT SERPL: 10.5 (ref 7–25)
CALCIUM SPEC-SCNC: 9.6 MG/DL (ref 8.6–10.5)
CHLORIDE SERPL-SCNC: 102 MMOL/L (ref 98–107)
CO2 SERPL-SCNC: 24.1 MMOL/L (ref 22–29)
CREAT SERPL-MCNC: 1.05 MG/DL (ref 0.76–1.27)
DEPRECATED RDW RBC AUTO: 39.8 FL (ref 37–54)
EGFRCR SERPLBLD CKD-EPI 2021: 75.4 ML/MIN/1.73
ERYTHROCYTE [DISTWIDTH] IN BLOOD BY AUTOMATED COUNT: 12 % (ref 12.3–15.4)
GLOBULIN UR ELPH-MCNC: 2.4 GM/DL
GLUCOSE SERPL-MCNC: 120 MG/DL (ref 65–99)
HCT VFR BLD AUTO: 44.9 % (ref 37.5–51)
HGB BLD-MCNC: 15.4 G/DL (ref 13–17.7)
MCH RBC QN AUTO: 30.7 PG (ref 26.6–33)
MCHC RBC AUTO-ENTMCNC: 34.3 G/DL (ref 31.5–35.7)
MCV RBC AUTO: 89.6 FL (ref 79–97)
NT-PROBNP SERPL-MCNC: <36 PG/ML (ref 0–900)
NT-PROBNP SERPL-MCNC: <36 PG/ML (ref 0–900)
PLATELET # BLD AUTO: 186 10*3/MM3 (ref 140–450)
PMV BLD AUTO: 10.6 FL (ref 6–12)
POTASSIUM SERPL-SCNC: 3.9 MMOL/L (ref 3.5–5.2)
PROT SERPL-MCNC: 6.9 G/DL (ref 6–8.5)
RBC # BLD AUTO: 5.01 10*6/MM3 (ref 4.14–5.8)
SODIUM SERPL-SCNC: 138 MMOL/L (ref 136–145)
TSH SERPL DL<=0.05 MIU/L-ACNC: 1.07 UIU/ML (ref 0.27–4.2)
WBC NRBC COR # BLD AUTO: 7.38 10*3/MM3 (ref 3.4–10.8)

## 2025-03-05 PROCEDURE — 36415 COLL VENOUS BLD VENIPUNCTURE: CPT

## 2025-03-05 PROCEDURE — 85027 COMPLETE CBC AUTOMATED: CPT

## 2025-03-05 PROCEDURE — 71046 X-RAY EXAM CHEST 2 VIEWS: CPT

## 2025-03-05 PROCEDURE — 84443 ASSAY THYROID STIM HORMONE: CPT

## 2025-03-05 PROCEDURE — 83880 ASSAY OF NATRIURETIC PEPTIDE: CPT

## 2025-03-05 PROCEDURE — 80053 COMPREHEN METABOLIC PANEL: CPT

## 2025-03-06 ENCOUNTER — TELEPHONE (OUTPATIENT)
Dept: CARDIOLOGY | Facility: CLINIC | Age: 75
End: 2025-03-06
Payer: MEDICARE

## 2025-03-06 NOTE — TELEPHONE ENCOUNTER
Called and left VM, will continue to try to reach pt.    HUB- please put patient straight through to triage    Natasha Hanna, RN  Triage RN  03/06/25 09:52 EST

## 2025-03-06 NOTE — TELEPHONE ENCOUNTER
----- Message from Vicky Aly sent at 3/6/2025  9:47 AM EST -----  His lab with the labs and chest x-ray are stable.  Await testing results.

## 2025-03-07 NOTE — TELEPHONE ENCOUNTER
Left VM of results and to call back with any further questions or concerns, allowed by verbal release form.     Brandi Willis RN  Triage MG

## 2025-04-01 ENCOUNTER — TELEPHONE (OUTPATIENT)
Dept: CARDIOLOGY | Age: 75
End: 2025-04-01
Payer: MEDICARE

## 2025-04-07 ENCOUNTER — OFFICE VISIT (OUTPATIENT)
Dept: FAMILY MEDICINE CLINIC | Age: 75
End: 2025-04-07
Payer: MEDICARE

## 2025-04-07 VITALS
WEIGHT: 229.4 LBS | TEMPERATURE: 97.8 F | BODY MASS INDEX: 32.84 KG/M2 | HEART RATE: 71 BPM | OXYGEN SATURATION: 97 % | SYSTOLIC BLOOD PRESSURE: 139 MMHG | HEIGHT: 70 IN | DIASTOLIC BLOOD PRESSURE: 73 MMHG

## 2025-04-07 DIAGNOSIS — H02.89 PAIN AND SWELLING OF EYELID OF LEFT EYE: ICD-10-CM

## 2025-04-07 DIAGNOSIS — H53.8 BLURRY VISION, LEFT EYE: ICD-10-CM

## 2025-04-07 DIAGNOSIS — H02.846 PAIN AND SWELLING OF EYELID OF LEFT EYE: ICD-10-CM

## 2025-04-07 DIAGNOSIS — H57.8A2 SENSATION OF FOREIGN BODY IN LEFT EYE: Primary | ICD-10-CM

## 2025-04-07 DIAGNOSIS — R23.8 DRY SCALP: ICD-10-CM

## 2025-04-07 PROCEDURE — 3075F SYST BP GE 130 - 139MM HG: CPT

## 2025-04-07 PROCEDURE — 1160F RVW MEDS BY RX/DR IN RCRD: CPT

## 2025-04-07 PROCEDURE — 1159F MED LIST DOCD IN RCRD: CPT

## 2025-04-07 PROCEDURE — 99213 OFFICE O/P EST LOW 20 MIN: CPT

## 2025-04-07 PROCEDURE — 1125F AMNT PAIN NOTED PAIN PRSNT: CPT

## 2025-04-07 PROCEDURE — 3078F DIAST BP <80 MM HG: CPT

## 2025-04-07 NOTE — PROGRESS NOTES
Subjective     CHIEF COMPLAINT    Chief Complaint   Patient presents with    Eye Problem     (L) onset Friday. Blurred vision.        History of Present Illness:  Karan Graham is a 74 y.o. male who presents to [facility] with acute complaint of blurry vision, eye pain/itchiness.  Patient reports that this past Friday he was drilling concrete, a lenard of wind came around and he thinks that he may have gotten some concrete into his eye/eyelid.  He states it is itchy and painful.  He states his vision is somewhat blurry.  Does note some watery eyes.  When he blinks he feels like there is something in there.  He denies any fever or chills.  He has a bump noted to the upper left eyelid.    Additionally, he is concerned he may have psoriasis of his scalp.  States that his scalp will get red and itchy.      Review of Systems   Constitutional:  Negative for chills and fever.   Eyes:  Positive for pain, discharge, itching and visual disturbance.       Past Medical History:   Diagnosis Date    Arthritis of back     Years ago    Asthma     CAD (coronary artery disease)     hx stents june 2020    Callus     Clotting disorder     Blood thinners    Colon polyp Years ago    Diverticulosis     Fracture, clavicle     GERD (gastroesophageal reflux disease) Many years ago    Hypertension     Knee swelling Years ago    Low back pain     Low back strain     Lumbosacral disc disease     Other allergic rhinitis 11/03/2022    Pancreas (digestive gland) works poorly     Rotator cuff syndrome     Skin cancer     Sleep apnea          Past Surgical History:   Procedure Laterality Date    BACK SURGERY      CARDIAC CATHETERIZATION  06/2020    CARDIAC CATHETERIZATION N/A 02/21/2023    Procedure: Left Heart Cath;  Surgeon: Eduard Pedroza MD;  Location: [facility] CATH INVASIVE LOCATION;  Service: Cardiovascular;  Laterality: N/A;    CARDIAC CATHETERIZATION N/A 02/21/2023    Procedure: Coronary angiography;   Surgeon: Eduard Pedroza MD;  Location: [facility] INVASIVE LOCATION;  Service: Cardiovascular;  Laterality: N/A;    CHOLECYSTECTOMY      COLONOSCOPY  2020    NORMAL    COLONOSCOPY  2023    POLYPS    CORONARY ANGIOPLASTY WITH STENT PLACEMENT      TWICE    CORONARY STENT PLACEMENT      FRACTURE SURGERY  Various    HAND SURGERY      Years ago    HEMORRHOIDECTOMY      PANCREAS SURGERY      STENTING    SHOULDER SURGERY      SINUS SURGERY  2022    Deviated septum    TOE SURGERY Right     big toe    TONSILLECTOMY      TRIGGER POINT INJECTION      TUMOR REMOVAL      Roof Of Mouth         Family History   Problem Relation Age of Onset    Hypertension Mother     Leukemia Mother     No Known Problems Father     Hypertension Sister     Heart disease Brother     Cancer Brother         Colon cancer    Cancer Brother         Prostate    Cancer Brother         Lung cancer    Cancer Brother         Lung cancer    No Known Problems Maternal Aunt     No Known Problems Maternal Uncle     No Known Problems Paternal Aunt     No Known Problems Paternal Uncle     No Known Problems Maternal Grandmother     No Known Problems Maternal Grandfather     No Known Problems Paternal Grandmother     No Known Problems Paternal Grandfather     Cancer Other     Arthritis Brother         Arthritis    Anesthesia problems Neg Hx     Broken bones Neg Hx     Clotting disorder Neg Hx     Collagen disease Neg Hx     Diabetes Neg Hx     Dislocations Neg Hx     Osteoporosis Neg Hx     Rheumatologic disease Neg Hx     Scoliosis Neg Hx     Severe sprains Neg Hx          Social History     Socioeconomic History    Marital status:    Tobacco Use    Smoking status: Former     Types: Pipe     Passive exposure: Past    Smokeless tobacco: Never    Tobacco comments:     Very little   Vaping Use    Vaping status: Never Used   Substance and Sexual Activity    Alcohol use: Yes     Comment: Don’t drink weekly    Drug use: No    Sexual activity: Yes     Partners:  Female     Birth control/protection: Tubal ligation         Allergies   Allergen Reactions    Isosorbide Headache    Codeine Diarrhea and Nausea And Vomiting    Ibuprofen Nausea And Vomiting          Current Outpatient Medications on File Prior to Visit   Medication Sig Dispense Refill    albuterol sulfate  (90 Base) MCG/ACT inhaler INHALE 2 PUFFS EVERY 6 HOURS AS NEEDED FOR SHORTNESS OF BREATH      amLODIPine (NORVASC) 2.5 MG tablet Take 1 tablet by mouth Daily. 30 tablet 11    aspirin 81 MG chewable tablet Chew 1 tablet Daily.      atorvastatin (LIPITOR) 40 MG tablet TAKE 1 TABLET BY MOUTH EVERY NIGHT 90 tablet 3    celecoxib (CeleBREX) 200 MG capsule TAKE 1 CAPSULE BY MOUTH TWICE DAILY WITH FOOD AS NEEDED AS DIRECTED FOR MILD PAIN OR MODERATE PAIN 60 capsule 2    ciprofloxacin (CIPRO) 500 MG tablet       clindamycin (CLEOCIN T) 1 % lotion APPLY A THIN LAYER TO THE AFFECTED AREA ON THE LEFT ARM TWICE DAILY DURING FLARES AND ONCE DAILY FOR MAINTENANCE      clopidogrel (PLAVIX) 75 MG tablet TAKE 1 TABLET BY MOUTH DAILY 90 tablet 1    ketoconazole (NIZORAL) 2 % cream Apply  topically to the appropriate area as directed.      Loratadine 10 MG capsule Take  by mouth.      metoprolol succinate XL (TOPROL-XL) 25 MG 24 hr tablet Take 0.5 tablets by mouth Daily.      metroNIDAZOLE (FLAGYL) 500 MG tablet       nitroglycerin (NITROSTAT) 0.4 MG SL tablet Place 1 tablet under the tongue Every 5 (Five) Minutes As Needed for Chest Pain. Take no more than 3 doses in 15 minutes. 30 tablet 11    pantoprazole (PROTONIX) 40 MG EC tablet TAKE 1 TABLET BY MOUTH EVERY NIGHT 90 tablet 1    tamsulosin (FLOMAX) 0.4 MG capsule 24 hr capsule Take 1 capsule by mouth 2 (Two) Times a Day.      triamcinolone (KENALOG) 0.1 % ointment Apply 1 application topically to the appropriate area as directed 2 (Two) Times a Day. 30 g 2    [DISCONTINUED] diclofenac (VOLTAREN) 0.1 % ophthalmic solution PLACE 1 DROP INTO BOTH EYES FOUR TIMES DAILY FOR  "30 DAYS       No current facility-administered medications on file prior to visit.          /73   Pulse 71   Temp 97.8 °F (36.6 °C) (Oral)   Ht 177.8 cm (70\")   Wt 104 kg (229 lb 6.4 oz)   SpO2 97% Comment: room air  BMI 32.92 kg/m²     Vision Screening    Right eye Left eye Both eyes   Without correction      With correction 20/20 20/25 20/15     Objective     Physical Exam  Vitals and nursing note reviewed.   Constitutional:       General: He is not in acute distress.     Appearance: Normal appearance. He is not ill-appearing.   HENT:      Head: Normocephalic.      Mouth/Throat:      Lips: Pink.   Eyes:      General:         Left eye: No discharge or hordeolum.      Extraocular Movements:      Left eye: Normal extraocular motion.      Conjunctiva/sclera:      Left eye: Left conjunctiva is injected.        Comments: Bump to left upper eyelid, approximately in area noted above.    Neurological:      General: No focal deficit present.      Mental Status: He is alert and oriented to person, place, and time.   Psychiatric:         Mood and Affect: Mood and affect normal.         Behavior: Behavior normal.          Assessment & Plan  Sensation of foreign body in left eye    Orders:    Ambulatory Referral to Optometry    Pain and swelling of eyelid of left eye    Orders:    Ambulatory Referral to Optometry    Blurry vision, left eye    Orders:    Ambulatory Referral to Optometry    Dry scalp  Trial head and shoulder shampoo, can refer to dermatology if needed.             Urgent referral to optometry placed for further evaluation and management. Patient requests [facility]. Recommend warm compresses in the interim. ER precautions advised.       Follow up:  No follow-ups on file.  Patient was given instructions and counseling regarding his condition or for health maintenance advice. Please see specific information pulled into the AVS if appropriate.   "

## 2025-04-09 RX ORDER — AMLODIPINE BESYLATE 2.5 MG/1
2.5 TABLET ORAL DAILY
Qty: 90 TABLET | Refills: 1 | Status: SHIPPED | OUTPATIENT
Start: 2025-04-09

## 2025-04-14 ENCOUNTER — TELEPHONE (OUTPATIENT)
Dept: CARDIOLOGY | Age: 75
End: 2025-04-14
Payer: MEDICARE

## 2025-04-15 ENCOUNTER — HOSPITAL ENCOUNTER (OUTPATIENT)
Dept: CARDIOLOGY | Facility: HOSPITAL | Age: 75
Discharge: HOME | End: 2025-04-15
Admitting: NURSE PRACTITIONER
Payer: MEDICARE

## 2025-04-15 ENCOUNTER — OFFICE (OUTPATIENT)
Dept: URBAN - METROPOLITAN AREA CLINIC 76 | Facility: CLINIC | Age: 75
End: 2025-04-15
Payer: MEDICARE

## 2025-04-15 VITALS — BODY MASS INDEX: 32.82 KG/M2 | WEIGHT: 229.28 LBS | HEIGHT: 70 IN

## 2025-04-15 VITALS
SYSTOLIC BLOOD PRESSURE: 140 MMHG | HEART RATE: 68 BPM | DIASTOLIC BLOOD PRESSURE: 72 MMHG | OXYGEN SATURATION: 98 % | WEIGHT: 225 LBS | HEIGHT: 70 IN

## 2025-04-15 DIAGNOSIS — R10.9 UNSPECIFIED ABDOMINAL PAIN: ICD-10-CM

## 2025-04-15 DIAGNOSIS — K21.9 GASTRO-ESOPHAGEAL REFLUX DISEASE WITHOUT ESOPHAGITIS: ICD-10-CM

## 2025-04-15 DIAGNOSIS — I25.118 CORONARY ARTERY DISEASE OF NATIVE ARTERY OF NATIVE HEART WITH STABLE ANGINA PECTORIS: ICD-10-CM

## 2025-04-15 LAB
BH CV NUCLEAR PRIOR STUDY: 1
BH CV REST NUCLEAR ISOTOPE DOSE: 25.8 MCI
BH CV STRESS BP STAGE 1: NORMAL
BH CV STRESS COMMENTS STAGE 1: NORMAL
BH CV STRESS DOSE REGADENOSON STAGE 1: 0.4
BH CV STRESS DURATION MIN STAGE 1: 0
BH CV STRESS DURATION SEC STAGE 1: 10
BH CV STRESS HR STAGE 1: 103
BH CV STRESS NUCLEAR ISOTOPE DOSE: 25.8 MCI
BH CV STRESS PROTOCOL 1: NORMAL
BH CV STRESS RECOVERY BP: NORMAL MMHG
BH CV STRESS RECOVERY HR: 86 BPM
BH CV STRESS RECOVERY O2: 95 %
BH CV STRESS STAGE 1: 1
MAXIMAL PREDICTED HEART RATE: 148 BPM
PERCENT MAX PREDICTED HR: 69.59 %
SPECT HRT GATED+EF W RNC IV: 63 %
STRESS BASELINE BP: NORMAL MMHG
STRESS BASELINE HR: 81 BPM
STRESS O2 SAT REST: 96 %
STRESS PERCENT HR: 82 %
STRESS POST EXERCISE DUR SEC: 10 SEC
STRESS POST O2 SAT PEAK: 98 %
STRESS POST PEAK BP: NORMAL MMHG
STRESS POST PEAK HR: 103 BPM
STRESS TARGET HR: 126 BPM

## 2025-04-15 PROCEDURE — A9555 RB82 RUBIDIUM: HCPCS | Performed by: NURSE PRACTITIONER

## 2025-04-15 PROCEDURE — 99213 OFFICE O/P EST LOW 20 MIN: CPT

## 2025-04-15 PROCEDURE — 93018 CV STRESS TEST I&R ONLY: CPT | Performed by: INTERNAL MEDICINE

## 2025-04-15 PROCEDURE — 25010000002 AMINOPHYLLINE PER 250 MG: Performed by: NURSE PRACTITIONER

## 2025-04-15 PROCEDURE — 25010000002 REGADENOSON 0.4 MG/5ML SOLUTION: Performed by: NURSE PRACTITIONER

## 2025-04-15 PROCEDURE — 78492 MYOCRD IMG PET MLT RST&STRS: CPT | Performed by: INTERNAL MEDICINE

## 2025-04-15 PROCEDURE — 93017 CV STRESS TEST TRACING ONLY: CPT

## 2025-04-15 PROCEDURE — 34310000005 RUBIDIUM CHLORIDE: Performed by: NURSE PRACTITIONER

## 2025-04-15 PROCEDURE — 78492 MYOCRD IMG PET MLT RST&STRS: CPT

## 2025-04-15 PROCEDURE — 93016 CV STRESS TEST SUPVJ ONLY: CPT | Performed by: INTERNAL MEDICINE

## 2025-04-15 RX ORDER — AMINOPHYLLINE 25 MG/ML
125 INJECTION, SOLUTION INTRAVENOUS ONCE
Status: COMPLETED | OUTPATIENT
Start: 2025-04-15 | End: 2025-04-15

## 2025-04-15 RX ORDER — REGADENOSON 0.08 MG/ML
0.4 INJECTION, SOLUTION INTRAVENOUS
Status: COMPLETED | OUTPATIENT
Start: 2025-04-15 | End: 2025-04-15

## 2025-04-15 RX ADMIN — AMINOPHYLLINE 125 MG: 25 INJECTION, SOLUTION INTRAVENOUS at 12:23

## 2025-04-15 RX ADMIN — REGADENOSON 0.4 MG: 0.08 INJECTION, SOLUTION INTRAVENOUS at 11:59

## 2025-05-14 ENCOUNTER — OFFICE VISIT (OUTPATIENT)
Dept: FAMILY MEDICINE CLINIC | Age: 75
End: 2025-05-14
Payer: MEDICARE

## 2025-05-14 VITALS
HEART RATE: 82 BPM | SYSTOLIC BLOOD PRESSURE: 118 MMHG | WEIGHT: 226 LBS | DIASTOLIC BLOOD PRESSURE: 76 MMHG | HEIGHT: 70 IN | BODY MASS INDEX: 32.35 KG/M2 | OXYGEN SATURATION: 96 % | TEMPERATURE: 98.6 F

## 2025-05-14 DIAGNOSIS — J45.20 MILD INTERMITTENT ASTHMA, UNCOMPLICATED: ICD-10-CM

## 2025-05-14 DIAGNOSIS — J01.10 ACUTE NON-RECURRENT FRONTAL SINUSITIS: ICD-10-CM

## 2025-05-14 DIAGNOSIS — R05.1 ACUTE COUGH: ICD-10-CM

## 2025-05-14 DIAGNOSIS — M15.9 GENERALIZED OSTEOARTHRITIS: ICD-10-CM

## 2025-05-14 DIAGNOSIS — J30.2 SEASONAL ALLERGIC RHINITIS, UNSPECIFIED TRIGGER: ICD-10-CM

## 2025-05-14 DIAGNOSIS — B35.3 TINEA PEDIS OF BOTH FEET: ICD-10-CM

## 2025-05-14 DIAGNOSIS — R06.02 SHORTNESS OF BREATH: Primary | ICD-10-CM

## 2025-05-14 RX ORDER — CELECOXIB 200 MG/1
200 CAPSULE ORAL DAILY
Qty: 30 CAPSULE | Refills: 0 | Status: SHIPPED | OUTPATIENT
Start: 2025-05-14

## 2025-05-14 RX ORDER — FLUTICASONE PROPIONATE 50 MCG
1 SPRAY, SUSPENSION (ML) NASAL 2 TIMES DAILY
Qty: 16 G | Refills: 0 | Status: SHIPPED | OUTPATIENT
Start: 2025-05-14

## 2025-05-14 RX ORDER — GUAIFENESIN AND DEXTROMETHORPHAN HYDROBROMIDE 600; 30 MG/1; MG/1
2 TABLET, EXTENDED RELEASE ORAL 2 TIMES DAILY PRN
Qty: 40 TABLET | Refills: 0 | Status: SHIPPED | OUTPATIENT
Start: 2025-05-14

## 2025-05-14 RX ORDER — KETOCONAZOLE 20 MG/G
CREAM TOPICAL DAILY
Qty: 30 G | Refills: 0 | Status: SHIPPED | OUTPATIENT
Start: 2025-05-14 | End: 2025-05-14

## 2025-05-14 RX ORDER — LORATADINE 10 MG/1
10 CAPSULE, LIQUID FILLED ORAL DAILY
Qty: 30 EACH | Refills: 0 | Status: SHIPPED | OUTPATIENT
Start: 2025-05-14

## 2025-05-14 RX ORDER — KETOCONAZOLE 20 MG/G
CREAM TOPICAL DAILY
Qty: 30 G | Refills: 0 | Status: SHIPPED | OUTPATIENT
Start: 2025-05-14

## 2025-05-14 RX ORDER — ALBUTEROL SULFATE 90 UG/1
2 INHALANT RESPIRATORY (INHALATION) EVERY 4 HOURS PRN
Qty: 18 G | Refills: 0 | Status: SHIPPED | OUTPATIENT
Start: 2025-05-14

## 2025-05-14 RX ORDER — AMOXICILLIN 500 MG/1
500 CAPSULE ORAL 3 TIMES DAILY
Qty: 21 CAPSULE | Refills: 0 | Status: SHIPPED | OUTPATIENT
Start: 2025-05-14

## 2025-05-14 NOTE — ASSESSMENT & PLAN NOTE
Orders:    albuterol sulfate  (90 Base) MCG/ACT inhaler; Inhale 2 puffs Every 4 (Four) Hours As Needed for Wheezing or Shortness of Air.

## 2025-05-15 ENCOUNTER — OFFICE VISIT (OUTPATIENT)
Dept: FAMILY MEDICINE CLINIC | Age: 75
End: 2025-05-15
Payer: MEDICARE

## 2025-05-15 ENCOUNTER — HOSPITAL ENCOUNTER (OUTPATIENT)
Dept: GENERAL RADIOLOGY | Facility: HOSPITAL | Age: 75
Discharge: HOME | End: 2025-05-15
Admitting: FAMILY MEDICINE
Payer: MEDICARE

## 2025-05-15 VITALS
WEIGHT: 226.6 LBS | HEIGHT: 70 IN | OXYGEN SATURATION: 98 % | DIASTOLIC BLOOD PRESSURE: 77 MMHG | SYSTOLIC BLOOD PRESSURE: 116 MMHG | HEART RATE: 86 BPM | BODY MASS INDEX: 32.44 KG/M2 | TEMPERATURE: 98.5 F

## 2025-05-15 DIAGNOSIS — K21.9 GERD WITHOUT ESOPHAGITIS: ICD-10-CM

## 2025-05-15 DIAGNOSIS — M25.512 CHRONIC PAIN IN LEFT SHOULDER: ICD-10-CM

## 2025-05-15 DIAGNOSIS — I10 ESSENTIAL HYPERTENSION: Primary | ICD-10-CM

## 2025-05-15 DIAGNOSIS — L21.0 SEBORRHEA CAPITIS: ICD-10-CM

## 2025-05-15 DIAGNOSIS — E78.00 HIGH CHOLESTEROL: ICD-10-CM

## 2025-05-15 DIAGNOSIS — G89.29 CHRONIC PAIN IN LEFT SHOULDER: ICD-10-CM

## 2025-05-15 PROBLEM — R07.9 CHEST PAIN: Status: RESOLVED | Noted: 2025-02-11 | Resolved: 2025-05-15

## 2025-05-15 PROBLEM — M79.642 LEFT HAND PAIN: Status: RESOLVED | Noted: 2025-02-11 | Resolved: 2025-05-15

## 2025-05-15 PROBLEM — Z00.00 MEDICARE ANNUAL WELLNESS VISIT, SUBSEQUENT: Status: RESOLVED | Noted: 2023-11-13 | Resolved: 2025-05-15

## 2025-05-15 PROCEDURE — 73030 X-RAY EXAM OF SHOULDER: CPT

## 2025-05-15 RX ORDER — KETOCONAZOLE 20 MG/ML
SHAMPOO, SUSPENSION TOPICAL 2 TIMES WEEKLY
Qty: 120 ML | Refills: 1 | Status: SHIPPED | OUTPATIENT
Start: 2025-05-15

## 2025-05-15 NOTE — ASSESSMENT & PLAN NOTE
Chief Complaint   Patient presents with    Annual Exam     1. \"Have you been to the ER, urgent care clinic since your last visit? Hospitalized since your last visit? \" No    2. \"Have you seen or consulted any other health care providers outside of the [address] [place] [address] since your last visit? \" No     3. For patients aged 39-70: Has the patient had a colonoscopy? NA - based on age     If the patient is female:    4. For patients aged 41-77: Has the patient had a mammogram within the past 2 years? Yes - no Care Gap present    5. For patients aged 21-65: Has the patient had a pap smear? Yes - no Care Gap present    Visit Vitals  /89 (BP 1 Location: Right arm, BP Patient Position: Sitting, BP Cuff Size: Adult)   Pulse 87   Temp 97.3 °F (36.3 °C) (Temporal)   Resp 19   Ht 5' 2\" (1.575 m)   Wt 177 lb 3.2 oz (80.4 kg)   SpO2 96%   BMI 32.41 kg/m²     . Hypertension is stable and controlled  Continue current treatment regimen.  Blood pressure will be reassessed in 6 months.

## 2025-05-15 NOTE — PROGRESS NOTES
Chief Complaint  Hypertension (6 months)    Subjective          Karan Graham presents to [facility]  History of Present Illness  --TOLERATING BLOOD PRESSURE MEDICATION WITHOUT APPARENT SIDE EFFECTS  --GERD IS WELL CONTROLLED WITH THE PPI  --LAST LIPIDS WERE OK, NO ISSUES WITH THE STATIN  --WORSENING LEFT SHOULDER PAIN, HAS NOT HAD X-RAYS OR P.T.  --SCALP FLAKING AND ITCHING        Allergies   Allergen Reactions    Isosorbide Headache    Codeine Diarrhea and Nausea And Vomiting    Ibuprofen Nausea And Vomiting        Health Maintenance Due   Topic Date Due    LIPID PANEL  05/15/2025        Current Outpatient Medications on File Prior to Visit   Medication Sig    albuterol sulfate  (90 Base) MCG/ACT inhaler Inhale 2 puffs Every 4 (Four) Hours As Needed for Wheezing or Shortness of Air.    amLODIPine (NORVASC) 2.5 MG tablet Take 1 tablet by mouth Daily.    amoxicillin (AMOXIL) 500 MG capsule Take 1 capsule by mouth 3 (Three) Times a Day.    aspirin 81 MG chewable tablet Chew 1 tablet Daily.    atorvastatin (LIPITOR) 40 MG tablet TAKE 1 TABLET BY MOUTH EVERY NIGHT    celecoxib (CeleBREX) 200 MG capsule Take 1 capsule by mouth Daily.    clindamycin (CLEOCIN T) 1 % lotion APPLY A THIN LAYER TO THE AFFECTED AREA ON THE LEFT ARM TWICE DAILY DURING FLARES AND ONCE DAILY FOR MAINTENANCE    clopidogrel (PLAVIX) 75 MG tablet TAKE 1 TABLET BY MOUTH DAILY    fluticasone (FLONASE) 50 MCG/ACT nasal spray Administer 1 spray into the nostril(s) as directed by provider 2 (Two) Times a Day.    guaifenesin-dextromethorphan 600-30 mg (MUCINEX DM)  MG tablet sustained-release 12 hour Take 2 tablets by mouth 2 (Two) Times a Day As Needed (cough/congestion).    ketoconazole (NIZORAL) 2 % cream Apply  topically to the appropriate area as directed Daily.    Loratadine 10 MG capsule Take 1 capsule by mouth Daily.    metoprolol succinate XL (TOPROL-XL) 25 MG 24 hr tablet Take 0.5 tablets by  "mouth Daily.    nitroglycerin (NITROSTAT) 0.4 MG SL tablet Place 1 tablet under the tongue Every 5 (Five) Minutes As Needed for Chest Pain. Take no more than 3 doses in 15 minutes.    pantoprazole (PROTONIX) 40 MG EC tablet TAKE 1 TABLET BY MOUTH EVERY NIGHT    tamsulosin (FLOMAX) 0.4 MG capsule 24 hr capsule Take 1 capsule by mouth 2 (Two) Times a Day.    triamcinolone (KENALOG) 0.1 % ointment Apply 1 application topically to the appropriate area as directed 2 (Two) Times a Day.     No current facility-administered medications on file prior to visit.       Immunization History   Administered Date(s) Administered    Arexvy (RSV, Adults 60+ yrs) 01/04/2024    COVID-19 (MODERNA) 12YRS+ (SPIKEVAX) 01/04/2024    COVID-19 (PFIZER) Purple Cap Monovalent 02/24/2021, 03/17/2021, 10/06/2021    Pneumococcal Conjugate 13-Valent (PCV13) 08/08/2016    Pneumococcal Polysaccharide (PPSV23) 10/01/2018       Review of Systems   Constitutional:  Negative for activity change, appetite change, chills, fatigue and fever.   HENT:  Negative for congestion, ear pain, rhinorrhea and sore throat.    Respiratory:  Negative for cough and shortness of breath.    Cardiovascular:  Negative for chest pain, palpitations and leg swelling.   Gastrointestinal:  Negative for abdominal pain, constipation, diarrhea, nausea and vomiting.   Musculoskeletal:  Positive for arthralgias. Negative for myalgias.   Neurological:  Negative for headache.        Objective     /77 (BP Location: Left arm, Patient Position: Sitting)   Pulse 86   Temp 98.5 °F (36.9 °C) (Oral)   Ht 177.8 cm (70\")   Wt 103 kg (226 lb 9.6 oz)   SpO2 98% Comment: on RA  BMI 32.51 kg/m²       Physical Exam  Vitals and nursing note reviewed.   Constitutional:       General: He is not in acute distress.     Appearance: Normal appearance.   Cardiovascular:      Rate and Rhythm: Normal rate and regular rhythm.      Heart sounds: Normal heart sounds. No murmur heard.  Pulmonary:      " Effort: Pulmonary effort is normal.      Breath sounds: Normal breath sounds.   Abdominal:      Palpations: Abdomen is soft.      Tenderness: There is no abdominal tenderness.   Musculoskeletal:      Cervical back: Neck supple.      Right lower leg: No edema.      Left lower leg: No edema.      Comments: LEFT SHOULDER WITH FULL ROM AND PALPABLE CREPITUS    Lymphadenopathy:      Cervical: No cervical adenopathy.   Neurological:      General: No focal deficit present.      Mental Status: He is alert.      Cranial Nerves: No cranial nerve deficit.      Coordination: Coordination normal.      Gait: Gait normal.   Psychiatric:         Mood and Affect: Mood normal.         Behavior: Behavior normal.         Result Review :                             Assessment and Plan      Diagnoses and all orders for this visit:    1. Essential hypertension (Primary)  Assessment & Plan:  Hypertension is stable and controlled  Continue current treatment regimen.  Blood pressure will be reassessed in 6 months.      2. GERD without esophagitis  Assessment & Plan:  IMPROVED WITH CURRENT TREATMENT, WILL REEVALUATE AT NEXT VISIT       3. High cholesterol  Assessment & Plan:   Lipid abnormalities are stable    Plan:  Continue same medication/s without change.      Discussed medication dosage, use, side effects, and goals of treatment in detail.    Counseled patient on lifestyle modifications to help control hyperlipidemia.     Patient Treatment Goals:   LDL goal is less than 70    Followup in 6 months.      4. Chronic pain in left shoulder  -     XR Shoulder 2+ View Left  -     Ambulatory Referral to Physical Therapy for Evaluation & Treatment    5. Seborrhea capitis  -     ketoconazole (NIZORAL) 2 % shampoo; Apply  topically to the appropriate area as directed 2 (Two) Times a Week.  Dispense: 120 mL; Refill: 1                    Follow Up     Return in about 6 months (around 11/15/2025).    Patient was given instructions and counseling  regarding his condition or for health maintenance advice. Please see specific information pulled into the AVS if appropriate.

## 2025-05-22 RX ORDER — CLOPIDOGREL BISULFATE 75 MG/1
75 TABLET ORAL DAILY
Qty: 90 TABLET | Refills: 1 | Status: SHIPPED | OUTPATIENT
Start: 2025-05-22

## 2025-05-27 ENCOUNTER — TELEPHONE (OUTPATIENT)
Dept: FAMILY MEDICINE CLINIC | Age: 75
End: 2025-05-27
Payer: MEDICARE

## 2025-05-27 DIAGNOSIS — Z86.0100 HISTORY OF COLON POLYPS: Primary | ICD-10-CM

## 2025-05-27 DIAGNOSIS — Z80.0 FAMILY HISTORY OF COLON CANCER: ICD-10-CM

## 2025-05-27 DIAGNOSIS — K21.9 GERD WITHOUT ESOPHAGITIS: ICD-10-CM

## 2025-05-27 NOTE — TELEPHONE ENCOUNTER
Caller: Karan Graham    Relationship: Self    Best call back number: [phone]     What is the medical concern/diagnosis:UPPER AND LOWER GI (ENDOSCOPY/COLOSCOPY)     What specialty or service is being requested: GENERAL SURGERY    What is the provider, practice or medical service name: DR TRU SUMNER     What is the office location: [facility]    Any additional details: PATIENT STATES THAT AT LAST Marshal Lawton MD HAD SUGGESTED IT, PATIENT WOULD LIKE TO GO AHEAD GET THOSE SETUP =  IF HE NEEDS TO BACK COME BACK IN, HE WILL.    PATIENT HAS HISTORY OF POLYUPS REMOVED, AND BROTHERS HAVE HAD HISTORY OF COLON HISTORY    PLEASE ADVISE

## 2025-05-28 NOTE — TELEPHONE ENCOUNTER
OK to place a referral to the general surgeon of his choice for upper and lower endoscopy due to reflux and a history of colon polyps.

## 2025-06-12 ENCOUNTER — OFFICE VISIT (OUTPATIENT)
Dept: FAMILY MEDICINE CLINIC | Age: 75
End: 2025-06-12
Payer: MEDICARE

## 2025-06-12 ENCOUNTER — HOSPITAL ENCOUNTER (OUTPATIENT)
Dept: GENERAL RADIOLOGY | Facility: HOSPITAL | Age: 75
Discharge: HOME | End: 2025-06-12
Payer: MEDICARE

## 2025-06-12 ENCOUNTER — LAB (OUTPATIENT)
Dept: LAB | Facility: HOSPITAL | Age: 75
End: 2025-06-12
Payer: MEDICARE

## 2025-06-12 VITALS
OXYGEN SATURATION: 97 % | HEART RATE: 73 BPM | SYSTOLIC BLOOD PRESSURE: 135 MMHG | HEIGHT: 70 IN | BODY MASS INDEX: 26.69 KG/M2 | DIASTOLIC BLOOD PRESSURE: 62 MMHG | RESPIRATION RATE: 20 BRPM | WEIGHT: 186.4 LBS | TEMPERATURE: 97.8 F

## 2025-06-12 DIAGNOSIS — Z11.59 ENCOUNTER FOR HEPATITIS C SCREENING TEST FOR LOW RISK PATIENT: ICD-10-CM

## 2025-06-12 DIAGNOSIS — R06.2 WHEEZING: ICD-10-CM

## 2025-06-12 DIAGNOSIS — Z11.59 NEED FOR HEPATITIS B SCREENING TEST: ICD-10-CM

## 2025-06-12 DIAGNOSIS — R10.9 ABDOMINAL DISCOMFORT: ICD-10-CM

## 2025-06-12 DIAGNOSIS — R05.3 PERSISTENT COUGH: ICD-10-CM

## 2025-06-12 DIAGNOSIS — Z13.9 SCREENING DUE: ICD-10-CM

## 2025-06-12 DIAGNOSIS — R09.81 NASAL CONGESTION: ICD-10-CM

## 2025-06-12 DIAGNOSIS — R09.81 NASAL CONGESTION: Primary | ICD-10-CM

## 2025-06-12 DIAGNOSIS — J01.10 ACUTE NON-RECURRENT FRONTAL SINUSITIS: ICD-10-CM

## 2025-06-12 DIAGNOSIS — R05.1 ACUTE COUGH: ICD-10-CM

## 2025-06-12 LAB
EXPIRATION DATE: NORMAL
FLUAV AG UPPER RESP QL IA.RAPID: NOT DETECTED
FLUBV AG UPPER RESP QL IA.RAPID: NOT DETECTED
HCV AB SER QL: NORMAL
INTERNAL CONTROL: NORMAL
Lab: NORMAL
SARS-COV-2 AG UPPER RESP QL IA.RAPID: NOT DETECTED

## 2025-06-12 PROCEDURE — 71046 X-RAY EXAM CHEST 2 VIEWS: CPT

## 2025-06-12 PROCEDURE — 86708 HEPATITIS A ANTIBODY: CPT

## 2025-06-12 PROCEDURE — 86704 HEP B CORE ANTIBODY TOTAL: CPT

## 2025-06-12 PROCEDURE — 83013 H PYLORI (C-13) BREATH: CPT

## 2025-06-12 PROCEDURE — 86803 HEPATITIS C AB TEST: CPT

## 2025-06-12 PROCEDURE — 36415 COLL VENOUS BLD VENIPUNCTURE: CPT

## 2025-06-12 RX ORDER — AMOXICILLIN 500 MG/1
500 CAPSULE ORAL 3 TIMES DAILY
Qty: 21 CAPSULE | Refills: 0 | Status: CANCELLED | OUTPATIENT
Start: 2025-06-12

## 2025-06-12 RX ORDER — GUAIFENESIN AND DEXTROMETHORPHAN HYDROBROMIDE 600; 30 MG/1; MG/1
2 TABLET, EXTENDED RELEASE ORAL 2 TIMES DAILY PRN
Qty: 40 TABLET | Refills: 0 | Status: CANCELLED | OUTPATIENT
Start: 2025-06-12

## 2025-06-12 RX ORDER — GUAIFENESIN AND DEXTROMETHORPHAN HYDROBROMIDE 600; 30 MG/1; MG/1
2 TABLET, EXTENDED RELEASE ORAL 2 TIMES DAILY PRN
Qty: 40 TABLET | Refills: 0 | Status: SHIPPED | OUTPATIENT
Start: 2025-06-12

## 2025-06-12 RX ORDER — PREDNISONE 20 MG/1
20 TABLET ORAL DAILY
Qty: 5 TABLET | Refills: 0 | Status: SHIPPED | OUTPATIENT
Start: 2025-06-12 | End: 2025-06-17

## 2025-06-12 NOTE — TELEPHONE ENCOUNTER
Caller: Karan Graham    Relationship: Self    Best call back number:   Telephone Information:   Mobile [phone] - SPOUSE     Requested Prescriptions:   Requested Prescriptions     Pending Prescriptions Disp Refills    amoxicillin (AMOXIL) 500 MG capsule 21 capsule 0     Sig: Take 1 capsule by mouth 3 (Three) Times a Day.    guaifenesin-dextromethorphan 600-30 mg (MUCINEX DM)  MG tablet sustained-release 12 hour 40 tablet 0     Sig: Take 2 tablets by mouth 2 (Two) Times a Day As Needed (cough/congestion).        Pharmacy where request should be sent: [id number] - [place], [place] - [address] AT [facility] OF [address] &  - [phone] [facility] [phone] FX     Last office visit with prescribing clinician: 5/14/2025   Last telemedicine visit with prescribing clinician: Visit date not found   Next office visit with prescribing clinician: Visit date not found     Additional details provided by patient:        THE PATIENT SAID HE IS STILL HAVING COUGH, NASAL DRAINAGE AND GREEN MUCUS.     HE IS WANTING TO KNOW IF HE CAN TAKE CLARITIN WITH HIS HEART MEDICATION. HE IS WANTING A CALL TO ADVISE IF THESE CAN BE REFILLED        Does the patient have less than a 3 day supply:  [x] Yes  [] No    Would you like a call back once the refill request has been completed: [x] Yes [] No    If the office needs to give you a call back, can they leave a voicemail: [x] Yes [] No    Storm High   06/12/25 09:21 EDT    this request.”

## 2025-06-14 PROBLEM — S92.014A CLOSED NONDISPLACED FRACTURE OF BODY OF RIGHT CALCANEUS: Status: ACTIVE | Noted: 2025-06-14

## 2025-06-14 PROBLEM — M72.2 PLANTAR FASCIAL FIBROMATOSIS: Status: ACTIVE | Noted: 2025-06-14

## 2025-06-14 PROBLEM — S90.851A FOREIGN BODY IN RIGHT FOOT: Status: ACTIVE | Noted: 2025-06-14

## 2025-06-14 PROBLEM — B35.1 OM (ONYCHOMYCOSIS): Status: ACTIVE | Noted: 2025-06-14

## 2025-06-14 LAB
HAV AB SER QL IA: NEGATIVE
HBV CORE AB SERPL QL IA: NEGATIVE

## 2025-06-14 NOTE — PROGRESS NOTES
Chief Complaint  Nasal Congestion (2 months ), Cough, and URI    Subjective      Karan Graham is a 75 y.o. male who presents to [facility]     History of Present Illness  The patient presents for evaluation of cough, shortness of breath, and fatigue.    He reports a persistent cough producing green phlegm, accompanied by rhinorrhea, which has been ongoing for approximately 2 months. He also experiences chest pain during coughing episodes. He has been using NyQuil to aid sleep, but it leaves him feeling groggy the following morning. He took Mucinex DM this morning, which provided some relief from sinus congestion. He was previously prescribed an antibiotic and a steroid, which he believes were beneficial. He has not yet started Flonase as it was not available at the pharmacy. He has a history of asthma and uses an inhaler, which he finds helpful. He also reports wheezing.     He has been experiencing shortness of breath since the placement of 2 stents in his heart. His physical activity has decreased due to fatigue, he feels this is a side effect of his metoprolol medication.    He reports high levels of fatigue and takes daily naps. His sleep pattern is irregular, often going to bed late and waking up early to attend to his dogs, after which he sleeps for an additional hour on the couch.    He is not diabetic. He requests a hepatitis test and an H. pylori bacteria test because he has been eating mulberries. He is having an upper and lower GI done here next month. He had abdominal discomfort yesterday and took laxatives last night.    He has a sore throat. He reports lesions on his vocal cords that need to be removed, which were found by an ENT doctor who also treated his nose.    MEDICATIONS  Current: Metoprolol, NyQuil, Mucinex DM         Objective   Vital Signs:   Vitals:    06/12/25 1559   BP: 135/62   BP Location: Left arm   Patient Position: Sitting   Cuff Size: Adult  "  Pulse: 73   Resp: 20   Temp: 97.8 °F (36.6 °C)   TempSrc: Oral   SpO2: 97%  Comment: RA   Weight: 84.6 kg (186 lb 6.4 oz)   Height: 177.8 cm (70\")     Body mass index is 26.75 kg/m².    Wt Readings from Last 3 Encounters:   06/12/25 84.6 kg (186 lb 6.4 oz)   05/15/25 103 kg (226 lb 9.6 oz)   05/14/25 103 kg (226 lb)     BP Readings from Last 3 Encounters:   06/12/25 135/62   05/15/25 116/77   05/14/25 118/76       Health Maintenance   Topic Date Due    LIPID PANEL  05/15/2025    ZOSTER VACCINE (1 of 2) 10/04/2025 (Originally 5/12/2002)    COVID-19 Vaccine (5 - 2024-25 season) 10/07/2025 (Originally 9/1/2024)    TDAP/TD VACCINES (1 - Tdap) 11/14/2025 (Originally 5/12/1969)    INFLUENZA VACCINE  07/01/2025    ANNUAL WELLNESS VISIT  11/14/2025    COLORECTAL CANCER SCREENING  10/02/2033    HEPATITIS C SCREENING  Completed    Pneumococcal Vaccine 50+  Completed    AAA SCREEN ONCE  Completed       XR Chest PA & Lateral  Result Date: 6/13/2025  Impression: No active pulmonary process. Electronically Signed: Radhames Lamb MD  6/13/2025 11:32 AM EDT  Workstation ID: [id number]    XR Shoulder 2+ View Left  Result Date: 5/19/2025  Impression: Left shoulder series demonstrating mild degenerative change consistent with osteoarthritis. Electronically Signed: Arturo Estrada MD  5/19/2025 5:56 PM EDT  Workstation ID: [id number]    XR Chest 2 View  Result Date: 3/6/2025  No acute cardiopulmonary findings.    3/6/2025 12:38 AM by Dr. Ti Lind MD on Workstation: [id number]         Physical Exam     Physical Exam  Throat is red.  Lungs were auscultated.       Result Review :  The following data was reviewed by: AMINAH Alvarez on 06/12/2025:         Procedures          ASSESSMENT/PLAN  Diagnoses and all orders for this visit:    1. Nasal congestion (Primary)  -     POCT SARS-CoV-2 Antigen ALPESH + Flu  -     guaifenesin-dextromethorphan 600-30 mg (MUCINEX DM)  MG tablet sustained-release 12 hour; Take 2 tablets by mouth 2 " (Two) Times a Day As Needed (cough/congestion).  Dispense: 40 tablet; Refill: 0  -     XR Chest PA & Lateral; Future  -     amoxicillin-clavulanate (AUGMENTIN) 875-125 MG per tablet; Take 1 tablet by mouth 2 (Two) Times a Day for 10 days.  Dispense: 20 tablet; Refill: 0    2. Persistent cough  -     guaifenesin-dextromethorphan 600-30 mg (MUCINEX DM)  MG tablet sustained-release 12 hour; Take 2 tablets by mouth 2 (Two) Times a Day As Needed (cough/congestion).  Dispense: 40 tablet; Refill: 0  -     XR Chest PA & Lateral; Future  -     amoxicillin-clavulanate (AUGMENTIN) 875-125 MG per tablet; Take 1 tablet by mouth 2 (Two) Times a Day for 10 days.  Dispense: 20 tablet; Refill: 0    3. Wheezing  -     XR Chest PA & Lateral; Future  -     predniSONE (DELTASONE) 20 MG tablet; Take 1 tablet by mouth Daily for 5 days.  Dispense: 5 tablet; Refill: 0  -     amoxicillin-clavulanate (AUGMENTIN) 875-125 MG per tablet; Take 1 tablet by mouth 2 (Two) Times a Day for 10 days.  Dispense: 20 tablet; Refill: 0    4. Encounter for hepatitis C screening test for low risk patient  Comments:  Will notify patient of results and treat accordingly.  Orders:  -     Hepatitis C antibody; Future  -     Hepatitis A antibody, total; Future    5. Need for hepatitis B screening test  Comments:  Will notify patient of results and treat accordingly.  Orders:  -     Hepatitis A antibody, total; Future  -     Hepatitis B core antibody, total; Future    6. Screening due  Comments:  Will notify patient of results and treat accordingly.  Orders:  -     Hepatitis A antibody, total; Future    7. Abdominal discomfort  -     H. Pylori Breath Test - Breath, Lung; Future        Assessment & Plan  1. Cough with green phlegm.  He has been experiencing a cough with green phlegm for about 2 months. A chest x-ray will be ordered to rule out pneumonia. A prescription for prednisone 20 mg once daily for 5 days has been provided. A refill of Mucinex DM has  been sent to his pharmacy. A Z-Toro (azithromycin) will be prescribed for 5 days. If the chest x-ray indicates pneumonia, the antibiotic regimen will be adjusted accordingly.    2. Shortness of breath.  He reports shortness of breath, which may be related to his cardiac history, including two stents and current use of metoprolol. The chest x-ray will help determine if there is a pulmonary cause.    3. Fatigue.  He reports high fatigue levels, that he feels is likely exacerbated by his current medication, metoprolol. No changes to his medication regimen are recommended at this time.    4. Sore throat.  His throat appears red, likely due to postnasal drip. Flonase is recommended to alleviate this symptom.     5. Health maintenance.  A hepatitis panel (AB and C) will be conducted. An H. pylori breath test will also be performed.    PROCEDURE  The patient had 2 stents placed in his heart previously.               Karan Graham  reports that he has quit smoking. His smoking use included pipe. He has been exposed to tobacco smoke. He has never used smokeless tobacco.        FOLLOW UP  Return if symptoms worsen or fail to improve, for Next scheduled follow up.  Patient was given instructions and counseling regarding his condition or for health maintenance advice. Please see specific information pulled into the AVS if appropriate.       AMINAH Alvarez  06/14/25  14:53 EDT    Patient or patient representative verbalized consent for the use of Ambient Listening during the visit with  AMINAH Alvarez for chart documentation. 6/14/2025  14:52 EDT

## 2025-06-15 LAB — UREA BREATH TEST QL: NEGATIVE

## 2025-06-30 RX ORDER — PANTOPRAZOLE SODIUM 40 MG/1
40 TABLET, DELAYED RELEASE ORAL NIGHTLY
Qty: 90 TABLET | Refills: 0 | Status: SHIPPED | OUTPATIENT
Start: 2025-06-30

## 2025-07-03 ENCOUNTER — OFFICE VISIT (OUTPATIENT)
Dept: SURGERY | Facility: CLINIC | Age: 75
End: 2025-07-03
Payer: MEDICARE

## 2025-07-03 VITALS
WEIGHT: 223.2 LBS | DIASTOLIC BLOOD PRESSURE: 70 MMHG | SYSTOLIC BLOOD PRESSURE: 135 MMHG | BODY MASS INDEX: 31.95 KG/M2 | OXYGEN SATURATION: 98 % | HEIGHT: 70 IN | HEART RATE: 67 BPM

## 2025-07-03 DIAGNOSIS — R10.11 RUQ PAIN: ICD-10-CM

## 2025-07-03 DIAGNOSIS — R10.12 LUQ PAIN: ICD-10-CM

## 2025-07-03 DIAGNOSIS — Z80.0 FAMILY HX OF COLON CANCER: ICD-10-CM

## 2025-07-03 DIAGNOSIS — K21.9 GASTROESOPHAGEAL REFLUX DISEASE, UNSPECIFIED WHETHER ESOPHAGITIS PRESENT: Primary | ICD-10-CM

## 2025-07-03 PROCEDURE — 1160F RVW MEDS BY RX/DR IN RCRD: CPT | Performed by: PHYSICIAN ASSISTANT

## 2025-07-03 PROCEDURE — 1159F MED LIST DOCD IN RCRD: CPT | Performed by: PHYSICIAN ASSISTANT

## 2025-07-03 PROCEDURE — 3075F SYST BP GE 130 - 139MM HG: CPT | Performed by: PHYSICIAN ASSISTANT

## 2025-07-03 PROCEDURE — 99203 OFFICE O/P NEW LOW 30 MIN: CPT | Performed by: PHYSICIAN ASSISTANT

## 2025-07-03 PROCEDURE — 3078F DIAST BP <80 MM HG: CPT | Performed by: PHYSICIAN ASSISTANT

## 2025-07-03 NOTE — PROGRESS NOTES
ASSESSMENT/PLAN:    75-year-old gentleman with a strong family history of 5 brothers having had colon cancer and history of acute GI bleed from likely diverticular bleed in 2023.  With his symptoms of early satiety, nausea, abdominal discomfort and GERD, we will proceed with a colonoscopy and EGD at the same time.  Risks and rationale were discussed with him with the risk including but not limited to bleeding, infection, bowel perforation and the need for additional procedures.  Any additional recommendations will be given once the results have been reviewed.  He will need to come off of his Plavix for 5 days prior to the procedure.  All questions were answered at the time of this visit and he was willing to proceed with all recommendations.    CC:     GERD, abdominal discomfort, need for colonoscopy    HPI:    This is a 75-year-old gentleman presenting to the office today at the request of Dr. Marshal Light for consultation.  He has a family history significant for 5 brothers who have had colon cancer.  In 2023 he underwent a colonoscopy and EGD due to an acute GI bleed with evidence supporting a diverticular bleed.  He has had no dark tarry stools or bright red blood since this time however he has started to experience right sided discomfort, nausea on occasion, worsening reflux and early satiety.    ENDOSCOPY:   Colonoscopy 2023 diverticular bleed  EGD 2023 mild gastritis    RADIOLOGY:   CT abdomen pelvis 1/30/2025: Extensive distal descending and proximal sigmoid diverticulosis with mild wall thickening and small amount of free fluid in the pelvis no evidence of perforation or abscess    SOCIAL HISTORY:   Denies tobacco use  Occasional alcohol use    FAMILY HISTORY:    Colorectal cancer: 5 brothers    PREVIOUS ABDOMINAL SURGERY    Pancreatic stent placement    OTHER SURGERY  Past Surgical History:   Procedure Laterality Date    BACK SURGERY      CARDIAC CATHETERIZATION  06/2020    CARDIAC CATHETERIZATION N/A  02/21/2023    Procedure: Left Heart Cath;  Surgeon: Eduard Pedroza MD;  Location:  [facility];  Service: Cardiovascular;  Laterality: N/A;    CARDIAC CATHETERIZATION N/A 02/21/2023    Procedure: Coronary angiography;  Surgeon: Eduard Pedroza MD;  Location:  [facility];  Service: Cardiovascular;  Laterality: N/A;    CAROTID STENT  2020    Two stents    CHOLECYSTECTOMY      COLONOSCOPY  2020    NORMAL    COLONOSCOPY  2023    POLYPS    CORONARY ANGIOPLASTY WITH STENT PLACEMENT      TWICE    CORONARY STENT PLACEMENT  06/2020    FRACTURE SURGERY  Various    HAND SURGERY      Years ago    HEMORRHOIDECTOMY      PANCREAS SURGERY      STENTING    SHOULDER SURGERY      SINUS SURGERY  2022    Deviated septum    TOE SURGERY Right     big toe    TONSILLECTOMY      TRIGGER POINT INJECTION      TUMOR REMOVAL      Roof Of Mouth       PAST MEDICAL HISTORY:    Past Medical History:   Diagnosis Date    Arthritis of back     Years ago    Asthma     CAD (coronary artery disease)     hx stents june 2020    Callus     Clotting disorder     Blood thinners    Colon polyp Years ago    Diverticulosis     Fracture, clavicle     GERD (gastroesophageal reflux disease) Many years ago    Hypertension     Knee swelling Years ago    Low back pain     Low back strain     Lumbosacral disc disease     Other allergic rhinitis 11/03/2022    Pancreas (digestive gland) works poorly     Rotator cuff syndrome     Skin cancer     Sleep apnea        MEDICATIONS:     Current Outpatient Medications:     albuterol sulfate  (90 Base) MCG/ACT inhaler, Inhale 2 puffs Every 4 (Four) Hours As Needed for Wheezing or Shortness of Air., Disp: 18 g, Rfl: 0    amLODIPine (NORVASC) 2.5 MG tablet, Take 1 tablet by mouth Daily., Disp: 90 tablet, Rfl: 1    aspirin 81 MG chewable tablet, Chew 1 tablet Daily., Disp: , Rfl:     atorvastatin (LIPITOR) 40 MG tablet, TAKE 1 TABLET BY MOUTH EVERY NIGHT, Disp: 90 tablet, Rfl: 3     "celecoxib (CeleBREX) 200 MG capsule, Take 1 capsule by mouth Daily., Disp: 30 capsule, Rfl: 0    clindamycin (CLEOCIN T) 1 % lotion, APPLY A THIN LAYER TO THE AFFECTED AREA ON THE LEFT ARM TWICE DAILY DURING FLARES AND ONCE DAILY FOR MAINTENANCE, Disp: , Rfl:     clopidogrel (PLAVIX) 75 MG tablet, TAKE 1 TABLET BY MOUTH DAILY, Disp: 90 tablet, Rfl: 1    guaifenesin-dextromethorphan 600-30 mg (MUCINEX DM)  MG tablet sustained-release 12 hour, Take 2 tablets by mouth 2 (Two) Times a Day As Needed (cough/congestion)., Disp: 40 tablet, Rfl: 0    ketoconazole (NIZORAL) 2 % cream, Apply  topically to the appropriate area as directed Daily., Disp: 30 g, Rfl: 0    ketoconazole (NIZORAL) 2 % shampoo, Apply  topically to the appropriate area as directed 2 (Two) Times a Week., Disp: 120 mL, Rfl: 1    Loratadine 10 MG capsule, Take 1 capsule by mouth Daily., Disp: 30 each, Rfl: 0    metoprolol succinate XL (TOPROL-XL) 25 MG 24 hr tablet, Take 0.5 tablets by mouth Daily., Disp: , Rfl:     nitroglycerin (NITROSTAT) 0.4 MG SL tablet, Place 1 tablet under the tongue Every 5 (Five) Minutes As Needed for Chest Pain. Take no more than 3 doses in 15 minutes., Disp: 30 tablet, Rfl: 11    pantoprazole (PROTONIX) 40 MG EC tablet, TAKE 1 TABLET BY MOUTH EVERY NIGHT, Disp: 90 tablet, Rfl: 0    tamsulosin (FLOMAX) 0.4 MG capsule 24 hr capsule, Take 1 capsule by mouth 2 (Two) Times a Day., Disp: , Rfl:     triamcinolone (KENALOG) 0.1 % ointment, Apply 1 application topically to the appropriate area as directed 2 (Two) Times a Day., Disp: 30 g, Rfl: 2    ALLERGIES:   Allergies   Allergen Reactions    Isosorbide Headache    Codeine Diarrhea and Nausea And Vomiting    Ibuprofen Nausea And Vomiting       PHYSICAL EXAM:   Constitutional: Well-developed well-nourished, no acute distress  Vital signs:   Height 70\"  Weight 223  BMI 32  Neck: Supple, no palpable mass, trachea midline  Respiratory: Clear to auscultation, normal inspiratory " effort  Cardiovascular: Regular rate, no murmur, no carotid bruit  Gastrointestinal: Soft, nontender  Psychiatric: Alert and oriented ×3, normal affect          Rhett Vickers PA-C    [facility] - General [facility]  [address], [address]  [place] [address]    [address], [address]  [place] [address]    Office: [phone]  Fax: [phone]

## 2025-07-03 NOTE — H&P (VIEW-ONLY)
ASSESSMENT/PLAN:    75-year-old gentleman with a strong family history of 5 brothers having had colon cancer and history of acute GI bleed from likely diverticular bleed in 2023.  With his symptoms of early satiety, nausea, abdominal discomfort and GERD, we will proceed with a colonoscopy and EGD at the same time.  Risks and rationale were discussed with him with the risk including but not limited to bleeding, infection, bowel perforation and the need for additional procedures.  Any additional recommendations will be given once the results have been reviewed.  He will need to come off of his Plavix for 5 days prior to the procedure.  All questions were answered at the time of this visit and he was willing to proceed with all recommendations.    CC:     GERD, abdominal discomfort, need for colonoscopy    HPI:    This is a 75-year-old gentleman presenting to the office today at the request of Dr. Marshal Light for consultation.  He has a family history significant for 5 brothers who have had colon cancer.  In 2023 he underwent a colonoscopy and EGD due to an acute GI bleed with evidence supporting a diverticular bleed.  He has had no dark tarry stools or bright red blood since this time however he has started to experience right sided discomfort, nausea on occasion, worsening reflux and early satiety.    ENDOSCOPY:   Colonoscopy 2023 diverticular bleed  EGD 2023 mild gastritis    RADIOLOGY:   CT abdomen pelvis 1/30/2025: Extensive distal descending and proximal sigmoid diverticulosis with mild wall thickening and small amount of free fluid in the pelvis no evidence of perforation or abscess    SOCIAL HISTORY:   Denies tobacco use  Occasional alcohol use    FAMILY HISTORY:    Colorectal cancer: 5 brothers    PREVIOUS ABDOMINAL SURGERY    Pancreatic stent placement    OTHER SURGERY  Past Surgical History:   Procedure Laterality Date    BACK SURGERY      CARDIAC CATHETERIZATION  06/2020    CARDIAC CATHETERIZATION N/A  02/21/2023    Procedure: Left Heart Cath;  Surgeon: Eduard Pedroza MD;  Location:  [facility];  Service: Cardiovascular;  Laterality: N/A;    CARDIAC CATHETERIZATION N/A 02/21/2023    Procedure: Coronary angiography;  Surgeon: Eduard Pedroza MD;  Location:  [facility];  Service: Cardiovascular;  Laterality: N/A;    CAROTID STENT  2020    Two stents    CHOLECYSTECTOMY      COLONOSCOPY  2020    NORMAL    COLONOSCOPY  2023    POLYPS    CORONARY ANGIOPLASTY WITH STENT PLACEMENT      TWICE    CORONARY STENT PLACEMENT  06/2020    FRACTURE SURGERY  Various    HAND SURGERY      Years ago    HEMORRHOIDECTOMY      PANCREAS SURGERY      STENTING    SHOULDER SURGERY      SINUS SURGERY  2022    Deviated septum    TOE SURGERY Right     big toe    TONSILLECTOMY      TRIGGER POINT INJECTION      TUMOR REMOVAL      Roof Of Mouth       PAST MEDICAL HISTORY:    Past Medical History:   Diagnosis Date    Arthritis of back     Years ago    Asthma     CAD (coronary artery disease)     hx stents june 2020    Callus     Clotting disorder     Blood thinners    Colon polyp Years ago    Diverticulosis     Fracture, clavicle     GERD (gastroesophageal reflux disease) Many years ago    Hypertension     Knee swelling Years ago    Low back pain     Low back strain     Lumbosacral disc disease     Other allergic rhinitis 11/03/2022    Pancreas (digestive gland) works poorly     Rotator cuff syndrome     Skin cancer     Sleep apnea        MEDICATIONS:     Current Outpatient Medications:     albuterol sulfate  (90 Base) MCG/ACT inhaler, Inhale 2 puffs Every 4 (Four) Hours As Needed for Wheezing or Shortness of Air., Disp: 18 g, Rfl: 0    amLODIPine (NORVASC) 2.5 MG tablet, Take 1 tablet by mouth Daily., Disp: 90 tablet, Rfl: 1    aspirin 81 MG chewable tablet, Chew 1 tablet Daily., Disp: , Rfl:     atorvastatin (LIPITOR) 40 MG tablet, TAKE 1 TABLET BY MOUTH EVERY NIGHT, Disp: 90 tablet, Rfl: 3     "celecoxib (CeleBREX) 200 MG capsule, Take 1 capsule by mouth Daily., Disp: 30 capsule, Rfl: 0    clindamycin (CLEOCIN T) 1 % lotion, APPLY A THIN LAYER TO THE AFFECTED AREA ON THE LEFT ARM TWICE DAILY DURING FLARES AND ONCE DAILY FOR MAINTENANCE, Disp: , Rfl:     clopidogrel (PLAVIX) 75 MG tablet, TAKE 1 TABLET BY MOUTH DAILY, Disp: 90 tablet, Rfl: 1    guaifenesin-dextromethorphan 600-30 mg (MUCINEX DM)  MG tablet sustained-release 12 hour, Take 2 tablets by mouth 2 (Two) Times a Day As Needed (cough/congestion)., Disp: 40 tablet, Rfl: 0    ketoconazole (NIZORAL) 2 % cream, Apply  topically to the appropriate area as directed Daily., Disp: 30 g, Rfl: 0    ketoconazole (NIZORAL) 2 % shampoo, Apply  topically to the appropriate area as directed 2 (Two) Times a Week., Disp: 120 mL, Rfl: 1    Loratadine 10 MG capsule, Take 1 capsule by mouth Daily., Disp: 30 each, Rfl: 0    metoprolol succinate XL (TOPROL-XL) 25 MG 24 hr tablet, Take 0.5 tablets by mouth Daily., Disp: , Rfl:     nitroglycerin (NITROSTAT) 0.4 MG SL tablet, Place 1 tablet under the tongue Every 5 (Five) Minutes As Needed for Chest Pain. Take no more than 3 doses in 15 minutes., Disp: 30 tablet, Rfl: 11    pantoprazole (PROTONIX) 40 MG EC tablet, TAKE 1 TABLET BY MOUTH EVERY NIGHT, Disp: 90 tablet, Rfl: 0    tamsulosin (FLOMAX) 0.4 MG capsule 24 hr capsule, Take 1 capsule by mouth 2 (Two) Times a Day., Disp: , Rfl:     triamcinolone (KENALOG) 0.1 % ointment, Apply 1 application topically to the appropriate area as directed 2 (Two) Times a Day., Disp: 30 g, Rfl: 2    ALLERGIES:   Allergies   Allergen Reactions    Isosorbide Headache    Codeine Diarrhea and Nausea And Vomiting    Ibuprofen Nausea And Vomiting       PHYSICAL EXAM:   Constitutional: Well-developed well-nourished, no acute distress  Vital signs:   Height 70\"  Weight 223  BMI 32  Neck: Supple, no palpable mass, trachea midline  Respiratory: Clear to auscultation, normal inspiratory " effort  Cardiovascular: Regular rate, no murmur, no carotid bruit  Gastrointestinal: Soft, nontender  Psychiatric: Alert and oriented ×3, normal affect          Rhett Vickers PA-C    [facility] - General Surgery  [address], [address]  [place] [address]    [address], [address]  [place] [address]    Office: [phone]  Fax: [phone]

## 2025-07-04 NOTE — PROGRESS NOTES
I have reviewed the notes, assessments, and/or procedures performed by Rhett Vickers, I concur with her/his documentation of Karan Graham.

## 2025-07-09 ENCOUNTER — HOSPITAL ENCOUNTER (OUTPATIENT)
Facility: HOSPITAL | Age: 75
Setting detail: HOSPITAL OUTPATIENT SURGERY
Discharge: HOME | End: 2025-07-09
Attending: SURGERY | Admitting: SURGERY
Payer: MEDICARE

## 2025-07-09 ENCOUNTER — ANESTHESIA EVENT (OUTPATIENT)
Dept: GASTROENTEROLOGY | Facility: HOSPITAL | Age: 75
End: 2025-07-09
Payer: MEDICARE

## 2025-07-09 ENCOUNTER — ANESTHESIA (OUTPATIENT)
Dept: GASTROENTEROLOGY | Facility: HOSPITAL | Age: 75
End: 2025-07-09
Payer: MEDICARE

## 2025-07-09 VITALS
SYSTOLIC BLOOD PRESSURE: 115 MMHG | HEART RATE: 64 BPM | OXYGEN SATURATION: 94 % | DIASTOLIC BLOOD PRESSURE: 74 MMHG | RESPIRATION RATE: 16 BRPM

## 2025-07-09 DIAGNOSIS — K21.9 GASTROESOPHAGEAL REFLUX DISEASE, UNSPECIFIED WHETHER ESOPHAGITIS PRESENT: ICD-10-CM

## 2025-07-09 DIAGNOSIS — Z80.0 FAMILY HX OF COLON CANCER: ICD-10-CM

## 2025-07-09 DIAGNOSIS — R10.11 RUQ PAIN: ICD-10-CM

## 2025-07-09 DIAGNOSIS — R10.12 LUQ PAIN: ICD-10-CM

## 2025-07-09 PROCEDURE — 88305 TISSUE EXAM BY PATHOLOGIST: CPT | Performed by: SURGERY

## 2025-07-09 PROCEDURE — 45385 COLONOSCOPY W/LESION REMOVAL: CPT | Performed by: SURGERY

## 2025-07-09 PROCEDURE — 43235 EGD DIAGNOSTIC BRUSH WASH: CPT | Performed by: SURGERY

## 2025-07-09 PROCEDURE — 45380 COLONOSCOPY AND BIOPSY: CPT | Performed by: SURGERY

## 2025-07-09 PROCEDURE — 25810000003 LACTATED RINGERS PER 1000 ML: Performed by: SURGERY

## 2025-07-09 PROCEDURE — 25010000002 PROPOFOL 200 MG/20ML EMULSION: Performed by: NURSE ANESTHETIST, CERTIFIED REGISTERED

## 2025-07-09 PROCEDURE — 25010000002 LIDOCAINE 2% SOLUTION: Performed by: NURSE ANESTHETIST, CERTIFIED REGISTERED

## 2025-07-09 RX ORDER — SODIUM CHLORIDE, SODIUM LACTATE, POTASSIUM CHLORIDE, CALCIUM CHLORIDE 600; 310; 30; 20 MG/100ML; MG/100ML; MG/100ML; MG/100ML
30 INJECTION, SOLUTION INTRAVENOUS CONTINUOUS PRN
Status: DISCONTINUED | OUTPATIENT
Start: 2025-07-09 | End: 2025-07-09 | Stop reason: HOSPADM

## 2025-07-09 RX ORDER — PROPOFOL 10 MG/ML
INJECTION, EMULSION INTRAVENOUS AS NEEDED
Status: DISCONTINUED | OUTPATIENT
Start: 2025-07-09 | End: 2025-07-09 | Stop reason: SURG

## 2025-07-09 RX ORDER — LIDOCAINE HYDROCHLORIDE 20 MG/ML
INJECTION, SOLUTION INFILTRATION; PERINEURAL AS NEEDED
Status: DISCONTINUED | OUTPATIENT
Start: 2025-07-09 | End: 2025-07-09 | Stop reason: SURG

## 2025-07-09 RX ADMIN — SODIUM CHLORIDE, POTASSIUM CHLORIDE, SODIUM LACTATE AND CALCIUM CHLORIDE 30 ML/HR: 600; 310; 30; 20 INJECTION, SOLUTION INTRAVENOUS at 14:00

## 2025-07-09 RX ADMIN — LIDOCAINE HYDROCHLORIDE 80 MG: 20 INJECTION, SOLUTION INFILTRATION; PERINEURAL at 14:34

## 2025-07-09 RX ADMIN — PROPOFOL 250 MCG/KG/MIN: 10 INJECTION, EMULSION INTRAVENOUS at 14:35

## 2025-07-09 RX ADMIN — PROPOFOL 100 MG: 10 INJECTION, EMULSION INTRAVENOUS at 14:34

## 2025-07-09 NOTE — OP NOTE
PREOPERATIVE DIAGNOSIS:  GERD  Early satiety  Nausea  Abdominal discomfort  Family history of colon cancer in multiple first-degree relatives    POSTOPERATIVE DIAGNOSIS AND FINDINGS:  Normal upper endoscopy  Small cecal polyp  Small hepatic flexure polyp  Subcentimeter transverse colon polyp  Diverticulosis    PROCEDURE:  EGD  Colonoscopy to cecum with:  Cold biopsy removal of cecal polyp  Cold biopsy removal of hepatic flexure polyp  Cold snare removal of transverse colon polyp    SURGEON:  Mo Whatley MD    ANESTHESIA:  MAC    SPECIMEN(S):  Polyps    DESCRIPTION:  In decubitus position endoscope was inserted into the esophagus, stomach and duodenum. Antegrade and retroflex view of stomach performed.  First, second and proximal third portion of duodenum normal.  Gastric antrum, body, and retroflexed view of fundus normal.  GE junction and esophagus normal.    Digital rectal exam was normal. Colonoscope inserted under direct visualization of lumen to cecum confirmed by visualization of ileocecal valve and appendiceal orifice.  Scope slowly withdrawn circumferentially examining all mucosal surfaces.  Bowel preparation was good.  The polyps identified as outlined above.  Removed with technique outlined above.  Good hemostasis noted at all site, all polyps retrieved.  Moderate sigmoid diverticulosis noted.  Tolerated well, stable to recovery.    RECOMMENDATION FOR FUTURE SURVEILLANCE:  To be determined based on biopsy results and issued as separate report    Mo Whatley M.D.

## 2025-07-09 NOTE — ANESTHESIA PREPROCEDURE EVALUATION
Anesthesia Evaluation     Patient summary reviewed and Nursing notes reviewed                Airway   Mallampati: II  Dental      Pulmonary    (+) a smoker Former, asthma,sleep apnea  Cardiovascular     ECG reviewed  PT is on anticoagulation therapy  Patient on routine beta blocker  Rhythm: regular  Rate: normal    (+) hypertension, CAD, cardiac stents Drug eluting stent more than 12 months ago , dysrhythmias (RBBB), angina, hyperlipidemia      Neuro/Psych- negative ROS  GI/Hepatic/Renal/Endo    (+) GERD    Musculoskeletal     Abdominal    Substance History   (+) alcohol use     OB/GYN negative ob/gyn ROS         Other   arthritis,   history of cancer                  Anesthesia Plan    ASA 3     MAC     (ASA 3.5  Coronary stents  )  intravenous induction     Anesthetic plan, risks, benefits, and alternatives have been provided, discussed and informed consent has been obtained with: patient.      CODE STATUS:

## 2025-07-09 NOTE — DISCHARGE INSTRUCTIONS
For the rest of the day patient needs to be with a responsible adult.    For the rest of the day DO NOT work, drive, operate heavy machinery, drink alcohol, make important decisions or sign legal documents.    Advance to regular diet as tolerated, if your stomach is upset start with a light/bland diet.    Follow recommendations on procedure report if provided by your doctor.    Call Dr Whatley for problems [phone]    If these problems occur come to ER: large amounts of bleeding, trouble breathing, repeated vomiting, severe unrelieved pain, fever or chills.

## 2025-07-09 NOTE — ANESTHESIA POSTPROCEDURE EVALUATION
Patient: Karan Graham    Procedure Summary       Date: 07/09/25 Room / Location: [facility] ENDOSCOPY 1 /  [facility]    Anesthesia Start: 1425 Anesthesia Stop: 1458    Procedures:       COLONOSCOPY to cecum w cold biopsy and snare polypectomy      ESOPHAGOGASTRODUODENOSCOPY (Esophagus) Diagnosis:       Gastroesophageal reflux disease, unspecified whether esophagitis present      RUQ pain      LUQ pain      Family hx of colon cancer      (Gastroesophageal reflux disease, unspecified whether esophagitis present [K21.9])      (RUQ pain [R10.11])      (LUQ pain [R10.12])      (Family hx of colon cancer [Z80.0])    Surgeons: Mo Whatley MD Provider: Aaron Henderson MD    Anesthesia Type: MAC ASA Status: 3            Anesthesia Type: MAC    Vitals  Vitals Value Taken Time   /62 07/09/25 15:07   Temp     Pulse 66 07/09/25 15:07   Resp 16 07/09/25 15:07   SpO2 97 % 07/09/25 15:07           Post Anesthesia Care and Evaluation    Patient location during evaluation: PACU  Patient participation: complete - patient participated  Level of consciousness: awake and alert  Pain management: adequate    Airway patency: patent  Anesthetic complications: No anesthetic complications    Cardiovascular status: acceptable  Respiratory status: acceptable  Hydration status: acceptable    Comments: --------------------            07/09/25               1507     --------------------   BP:       112/62     Pulse:      66       Resp:       16       SpO2:      97%      --------------------     Pt here for left lung biopsy, procedure performed by Dr Mello, tolerated well, 25 mcg Fentanyl and 0.5 mg Versed for sedation. Specimen prepared for pathology. Pt to recover in Radiology now

## 2025-07-14 ENCOUNTER — RESULTS FOLLOW-UP (OUTPATIENT)
Dept: GASTROENTEROLOGY | Facility: HOSPITAL | Age: 75
End: 2025-07-14
Payer: MEDICARE

## 2025-07-14 LAB
CYTO UR: NORMAL
LAB AP CASE REPORT: NORMAL
PATH REPORT.FINAL DX SPEC: NORMAL
PATH REPORT.GROSS SPEC: NORMAL

## 2025-07-14 NOTE — TELEPHONE ENCOUNTER
ENDOSCOPY FOLLOW UP NOTE    Colonoscopy and EGD 7/9/2025    Indication:  GERD  Early satiety  Nausea  Abdominal discomfort  Family history of colon cancer in multiple first-degree relatives    Findings:  Normal upper endoscopy  Small cecal polyp: Tubular adenoma  Small hepatic flexure polyp: Tubular adenoma  Subcentimeter transverse colon polyp: Hyperplastic  Diverticulosis    Recommendations:  5-year surveillance    Mo Whatley M.D.

## 2025-07-14 NOTE — PROGRESS NOTES
Please let him know that his upper endoscopy was normal and his lower endoscopy showed 3 benign polyps, 5-year surveillance colonoscopy recommended-put in computer for reminder.  If he is continuing to have right sided abdominal pain, then I would recommend considering gallbladder ultrasound.

## 2025-07-16 RX ORDER — METOPROLOL SUCCINATE 25 MG/1
25 TABLET, EXTENDED RELEASE ORAL DAILY
Qty: 90 TABLET | Refills: 1 | Status: SHIPPED | OUTPATIENT
Start: 2025-07-16

## 2025-07-28 ENCOUNTER — LAB (OUTPATIENT)
Dept: LAB | Facility: HOSPITAL | Age: 75
End: 2025-07-28
Payer: MEDICARE

## 2025-07-28 ENCOUNTER — OFFICE VISIT (OUTPATIENT)
Dept: FAMILY MEDICINE CLINIC | Age: 75
End: 2025-07-28
Payer: MEDICARE

## 2025-07-28 VITALS
WEIGHT: 222.6 LBS | DIASTOLIC BLOOD PRESSURE: 76 MMHG | SYSTOLIC BLOOD PRESSURE: 131 MMHG | TEMPERATURE: 98.6 F | HEIGHT: 70 IN | HEART RATE: 65 BPM | RESPIRATION RATE: 20 BRPM | OXYGEN SATURATION: 98 % | BODY MASS INDEX: 31.87 KG/M2

## 2025-07-28 DIAGNOSIS — Z20.2 EXPOSURE TO SEXUALLY TRANSMITTED DISEASE (STD): ICD-10-CM

## 2025-07-28 DIAGNOSIS — R30.0 DYSURIA: ICD-10-CM

## 2025-07-28 DIAGNOSIS — Z11.59 ENCOUNTER FOR SCREENING FOR VIRAL DISEASE: ICD-10-CM

## 2025-07-28 DIAGNOSIS — R31.9 HEMATURIA, UNSPECIFIED TYPE: ICD-10-CM

## 2025-07-28 DIAGNOSIS — R30.0 DYSURIA: Primary | ICD-10-CM

## 2025-07-28 LAB
BILIRUB UR QL STRIP: NEGATIVE
C TRACH DNA SPEC QL NAA+PROBE: NOT DETECTED
CLARITY UR: CLEAR
COLOR UR: NORMAL
GLUCOSE UR STRIP-MCNC: NEGATIVE MG/DL
HGB UR QL STRIP.AUTO: NEGATIVE
HIV 1+2 AB+HIV1 P24 AG SERPL QL IA: NORMAL
KETONES UR QL STRIP: NEGATIVE
LEUKOCYTE ESTERASE UR QL STRIP.AUTO: NEGATIVE
N GONORRHOEA RRNA SPEC QL NAA+PROBE: NOT DETECTED
NITRITE UR QL STRIP: NEGATIVE
PH UR STRIP.AUTO: 6.5 [PH] (ref 5–8)
PROT UR QL STRIP: NEGATIVE
SP GR UR STRIP: 1.01 (ref 1–1.03)
UROBILINOGEN UR QL STRIP: NORMAL

## 2025-07-28 PROCEDURE — 87491 CHLMYD TRACH DNA AMP PROBE: CPT | Performed by: STUDENT IN AN ORGANIZED HEALTH CARE EDUCATION/TRAINING PROGRAM

## 2025-07-28 PROCEDURE — 86695 HERPES SIMPLEX TYPE 1 TEST: CPT

## 2025-07-28 PROCEDURE — 87591 N.GONORRHOEAE DNA AMP PROB: CPT | Performed by: STUDENT IN AN ORGANIZED HEALTH CARE EDUCATION/TRAINING PROGRAM

## 2025-07-28 PROCEDURE — 81003 URINALYSIS AUTO W/O SCOPE: CPT | Performed by: STUDENT IN AN ORGANIZED HEALTH CARE EDUCATION/TRAINING PROGRAM

## 2025-07-28 PROCEDURE — 1160F RVW MEDS BY RX/DR IN RCRD: CPT | Performed by: STUDENT IN AN ORGANIZED HEALTH CARE EDUCATION/TRAINING PROGRAM

## 2025-07-28 PROCEDURE — 99213 OFFICE O/P EST LOW 20 MIN: CPT | Performed by: STUDENT IN AN ORGANIZED HEALTH CARE EDUCATION/TRAINING PROGRAM

## 2025-07-28 PROCEDURE — 1159F MED LIST DOCD IN RCRD: CPT | Performed by: STUDENT IN AN ORGANIZED HEALTH CARE EDUCATION/TRAINING PROGRAM

## 2025-07-28 PROCEDURE — 86592 SYPHILIS TEST NON-TREP QUAL: CPT

## 2025-07-28 PROCEDURE — 3075F SYST BP GE 130 - 139MM HG: CPT | Performed by: STUDENT IN AN ORGANIZED HEALTH CARE EDUCATION/TRAINING PROGRAM

## 2025-07-28 PROCEDURE — 36415 COLL VENOUS BLD VENIPUNCTURE: CPT

## 2025-07-28 PROCEDURE — 86696 HERPES SIMPLEX TYPE 2 TEST: CPT

## 2025-07-28 PROCEDURE — 1125F AMNT PAIN NOTED PAIN PRSNT: CPT | Performed by: STUDENT IN AN ORGANIZED HEALTH CARE EDUCATION/TRAINING PROGRAM

## 2025-07-28 PROCEDURE — G0432 EIA HIV-1/HIV-2 SCREEN: HCPCS

## 2025-07-28 PROCEDURE — 3078F DIAST BP <80 MM HG: CPT | Performed by: STUDENT IN AN ORGANIZED HEALTH CARE EDUCATION/TRAINING PROGRAM

## 2025-07-28 RX ORDER — DOXYCYCLINE 100 MG/1
CAPSULE ORAL
COMMUNITY
Start: 2025-07-28

## 2025-07-28 NOTE — PROGRESS NOTES
Chief Complaint     Difficulty Urinating, Blood in Urine, and Exposure to STD (Seen urology and had a urine sample taken )    History of Present Illness     Karan Graham is a 75 y.o. male who presents to [facility].     Patient or patient representative verbalized consent for the use of Ambient Listening during the visit with  AMINAH Tobias for chart documentation. 7/28/2025  12:14 EDT      History of Present Illness  The patient is a 75-year-old male who presents for evaluation of lower abdominal pain, hematuria, and shoulder pain.    He reports experiencing discomfort in his lower abdomen, bloating, and difficulty urinating. This morning, he felt a build-up of pressure before he could urinate. Additionally, he describes a burning sensation during urination and an urgent need to use the bathroom. Last night, he noticed blood in his urine and describes it as having a strong odor. He does not report any penile discharge but mentions itching in the groin area, which he attributes to sweating. He reports no recent exposure to new sexual partners. His urologist, Dr. Rosalio Leroy, prescribed ampicillin for 30 days for his prostate condition. He takes Flomax twice daily for his prostate condition.    He has been attending physical therapy for his shoulder and is unable to undergo an MRI due to ongoing therapy sessions. His physical therapist suggested an ultrasound. He is seeking an order for further evaluation.    He has arthritis and takes Celebrex every other day. He was taking Tylenol but it makes him itch now, so he uses it sparingly. Last month, he experienced stomach issues and gained about 10 pounds. He went to the GI doctor who removed 3 polyps, which came back negative.    FAMILY HISTORY  Four or five brothers have had cancer. One brother had prostate, colon, and brain cancer and passed away. Other brothers have had prostate, COVID-19, and lung issues.         History       Past Medical History:   Diagnosis Date    Arthritis of back     Years ago    Asthma     CAD (coronary artery disease)     hx stents june 2020    Callus     Clotting disorder     Blood thinners    Colon polyp Years ago    Diverticulosis     Fracture, clavicle     GERD (gastroesophageal reflux disease) Many years ago    Hypertension     Knee swelling Years ago    Low back pain     Low back strain     Lumbosacral disc disease     Other allergic rhinitis 11/03/2022    Pancreas (digestive gland) works poorly     Rotator cuff syndrome     Skin cancer     Sleep apnea        Past Surgical History:   Procedure Laterality Date    BACK SURGERY      CARDIAC CATHETERIZATION  06/2020    CARDIAC CATHETERIZATION N/A 02/21/2023    Procedure: Left Heart Cath;  Surgeon: Eduard Pedroza MD;  Location: [facility] CATH INVASIVE LOCATION;  Service: Cardiovascular;  Laterality: N/A;    CARDIAC CATHETERIZATION N/A 02/21/2023    Procedure: Coronary angiography;  Surgeon: Eduard Pedroza MD;  Location: [facility] CATH INVASIVE LOCATION;  Service: Cardiovascular;  Laterality: N/A;    CAROTID STENT  2020    Two stents    CHOLECYSTECTOMY      COLONOSCOPY  2020    NORMAL    COLONOSCOPY  2023    POLYPS    COLONOSCOPY N/A 7/9/2025    Procedure: COLONOSCOPY to cecum w cold biopsy and snare polypectomy;  Surgeon: Mo Whatley MD;  Location: [facility] ENDOSCOPY;  Service: General;  Laterality: N/A;  PRE: Abd pain, Constipation, Family Hx of colon cancer  POST: polyps,tics    CORONARY ANGIOPLASTY WITH STENT PLACEMENT      TWICE    CORONARY STENT PLACEMENT  06/2020    ENDOSCOPY N/A 7/9/2025    Procedure: ESOPHAGOGASTRODUODENOSCOPY;  Surgeon: Mo Whatley MD;  Location: [facility] ENDOSCOPY;  Service: General;  Laterality: N/A;  PRE: GERD, Abd Pain,   POST: Normal    FRACTURE SURGERY  Various    HAND SURGERY      Years ago    HEMORRHOIDECTOMY      PANCREAS SURGERY      STENTING    SHOULDER SURGERY      SINUS SURGERY  2022    Deviated septum    TOE  SURGERY Right     big toe    TONSILLECTOMY      TRIGGER POINT INJECTION      TUMOR REMOVAL      [place]outh       Family History   Problem Relation Age of Onset    Hypertension Mother     Leukemia Mother     No Known Problems Father     Hypertension Sister     Heart disease Brother     Cancer Brother         Colon cancer    Colon cancer Brother     Prostate cancer Brother     Prostate cancer Brother     Colon cancer Brother     Cancer Brother         Prostate    Prostate cancer Brother     Cancer Brother         Lung cancer    Prostate cancer Brother     Colon cancer Brother     Cancer Brother         Lung cancer    Prostate cancer Brother     Arthritis Brother         Arthritis    No Known Problems Maternal Aunt     No Known Problems Maternal Uncle     No Known Problems Paternal Aunt     No Known Problems Paternal Uncle     No Known Problems Maternal Grandmother     No Known Problems Maternal Grandfather     No Known Problems Paternal Grandmother     No Known Problems Paternal Grandfather     Cancer Other     Anesthesia problems Neg Hx     Broken bones Neg Hx     Clotting disorder Neg Hx     Collagen disease Neg Hx     Diabetes Neg Hx     Dislocations Neg Hx     Osteoporosis Neg Hx     Rheumatologic disease Neg Hx     Scoliosis Neg Hx     Severe sprains Neg Hx         Current Medications        Current Outpatient Medications:     albuterol sulfate  (90 Base) MCG/ACT inhaler, Inhale 2 puffs Every 4 (Four) Hours As Needed for Wheezing or Shortness of Air., Disp: 18 g, Rfl: 0    amLODIPine (NORVASC) 2.5 MG tablet, Take 1 tablet by mouth Daily., Disp: 90 tablet, Rfl: 1    aspirin 81 MG chewable tablet, Chew 1 tablet Daily., Disp: , Rfl:     atorvastatin (LIPITOR) 40 MG tablet, TAKE 1 TABLET BY MOUTH EVERY NIGHT, Disp: 90 tablet, Rfl: 3    celecoxib (CeleBREX) 200 MG capsule, Take 1 capsule by mouth Daily., Disp: 30 capsule, Rfl: 0    clindamycin (CLEOCIN T) 1 % lotion, APPLY A THIN LAYER TO THE AFFECTED AREA  ON THE LEFT ARM TWICE DAILY DURING FLARES AND ONCE DAILY FOR MAINTENANCE, Disp: , Rfl:     clopidogrel (PLAVIX) 75 MG tablet, TAKE 1 TABLET BY MOUTH DAILY, Disp: 90 tablet, Rfl: 1    doxycycline (VIBRAMYCIN) 100 MG capsule, , Disp: , Rfl:     ketoconazole (NIZORAL) 2 % cream, Apply  topically to the appropriate area as directed Daily., Disp: 30 g, Rfl: 0    ketoconazole (NIZORAL) 2 % shampoo, Apply  topically to the appropriate area as directed 2 (Two) Times a Week., Disp: 120 mL, Rfl: 1    Loratadine 10 MG capsule, Take 1 capsule by mouth Daily., Disp: 30 each, Rfl: 0    metoprolol succinate XL (TOPROL-XL) 25 MG 24 hr tablet, TAKE 1 TABLET BY MOUTH DAILY, Disp: 90 tablet, Rfl: 1    nitroglycerin (NITROSTAT) 0.4 MG SL tablet, Place 1 tablet under the tongue Every 5 (Five) Minutes As Needed for Chest Pain. Take no more than 3 doses in 15 minutes., Disp: 30 tablet, Rfl: 11    pantoprazole (PROTONIX) 40 MG EC tablet, TAKE 1 TABLET BY MOUTH EVERY NIGHT, Disp: 90 tablet, Rfl: 0    tamsulosin (FLOMAX) 0.4 MG capsule 24 hr capsule, Take 1 capsule by mouth 2 (Two) Times a Day., Disp: , Rfl:     triamcinolone (KENALOG) 0.1 % ointment, Apply 1 application topically to the appropriate area as directed 2 (Two) Times a Day., Disp: 30 g, Rfl: 2    guaifenesin-dextromethorphan 600-30 mg (MUCINEX DM)  MG tablet sustained-release 12 hour, Take 2 tablets by mouth 2 (Two) Times a Day As Needed (cough/congestion)., Disp: 40 tablet, Rfl: 0     Allergies     Allergies   Allergen Reactions    Isosorbide Headache    Codeine Diarrhea and Nausea And Vomiting    Ibuprofen Nausea And Vomiting       Social History       Social History     Social History Narrative    Not on file       Immunizations     Immunization:  Immunization History   Administered Date(s) Administered    Arexvy (RSV, Adults 60+ yrs) 01/04/2024    COVID-19 (MODERNA) 12YRS+ (SPIKEVAX) 01/04/2024    COVID-19 (PFIZER) Purple Cap Monovalent 02/24/2021, 03/17/2021,  "10/06/2021    Pneumococcal Conjugate 13-Valent (PCV13) 08/08/2016    Pneumococcal Polysaccharide (PPSV23) 10/01/2018          Objective     Objective     Vital Signs:   /76 (BP Location: Left arm, Patient Position: Sitting, Cuff Size: Adult)   Pulse 65   Temp 98.6 °F (37 °C) (Oral)   Resp 20   Ht 177.8 cm (70\")   Wt 101 kg (222 lb 9.6 oz)   SpO2 98% Comment: RA  BMI 31.94 kg/m²       Physical Exam  Vitals and nursing note reviewed.   Constitutional:       Appearance: Normal appearance. He is obese.   HENT:      Head: Normocephalic.   Eyes:      Conjunctiva/sclera: Conjunctivae normal.      Pupils: Pupils are equal, round, and reactive to light.   Cardiovascular:      Rate and Rhythm: Normal rate and regular rhythm.      Pulses: Normal pulses.      Heart sounds: Normal heart sounds.   Pulmonary:      Effort: Pulmonary effort is normal.      Breath sounds: Normal breath sounds.   Abdominal:      General: Bowel sounds are normal.      Palpations: Abdomen is soft.   Musculoskeletal:         General: Normal range of motion.      Cervical back: Normal range of motion and neck supple.   Skin:     General: Skin is warm and dry.   Neurological:      General: No focal deficit present.      Mental Status: He is alert and oriented to person, place, and time.   Psychiatric:         Attention and Perception: Attention normal.         Mood and Affect: Mood and affect normal.         Behavior: Behavior normal. Behavior is cooperative.         Physical Exam  Respiratory: Clear to auscultation, no wheezing, rales or rhonchi  Cardiovascular: Regular rate and rhythm, no murmurs, rubs, or gallops      Results    The following data was reviewed by: AMINAH Tobias on 07/28/25             Chlamydia trachomatis, Neisseria gonorrhoeae, PCR - , Urine, Clean Catch (07/28/2025 13:01)  Urinalysis With Culture If Indicated - Urine, Clean Catch (07/28/2025 12:58)  HSV 1 and 2-Specific Ab, IgG (07/28/2025 12:28)  RPR Qualitative " with Reflex to Quant (07/28/2025 12:28)  HIV-1 / O / 2 Ag / Antibody (07/28/2025 12:28)  Results           Assessment and Plan        Assessment and Plan       Dysuria    Orders:    Urinalysis With Culture If Indicated -; Future    Urinalysis With Culture If Indicated - Urine, Clean Catch    Exposure to sexually transmitted disease (STD)    Orders:    Chlamydia trachomatis, Neisseria gonorrhoeae, PCR - Urine, Urine, Random Void; Future    HIV-1 / O / 2 Ag / Antibody; Future    RPR Qualitative with Reflex to Quant; Future    HSV 1 and 2-Specific Ab, IgG; Future    Chlamydia trachomatis, Neisseria gonorrhoeae, PCR - , Urine, Clean Catch    Encounter for screening for viral disease    Orders:    HIV-1 / O / 2 Ag / Antibody; Future    HSV 1 and 2-Specific Ab, IgG; Future    Hematuria, unspecified type    Orders:    Urinalysis With Culture If Indicated -; Future    Urinalysis With Culture If Indicated - Urine, Clean Catch         Assessment & Plan  1. Lower abdominal pain.  - Reports lower abdominal pain, bloating, and difficulty urinating with burning sensation.  - Experienced blood in urine last night.  - Urinalysis with culture if indicated will be conducted to check for bacterial infection.  - Ampicillin prescribed by urologist for 30 days.    2. Hematuria.  - Reported blood in urine last night.  - Urinalysis will be conducted to check for bacterial infection.  - No foul smell or cloudiness noted in urine.    3. Groin itching.  - Reports itching in the groin area, likely due to sweating.  - No penile discharge noted.  - No recent exposure to new sexual partners.    4. Shoulder pain.  - Attending physical therapy for shoulder pain.  - Unable to undergo MRI due to ongoing therapy sessions.  - Physical therapist suggested ultrasound, which could reveal abnormal tissues.  - Advised to discuss further evaluation with Dr. Diaz during upcoming appointment on 08/11/2025.    5. STD screening.  - Requested STD screening due  to concerns about potential exposure.  - Full STD panel including tests for chlamydia, gonorrhea, HIV, herpes simplex, and syphilis will be conducted.  - Blood work and urinalysis with culture if indicated ordered.    Follow-up: 08/11/2025 with Dr. Diaz.        Follow Up        Follow Up   Return for With PCP, Next scheduled follow up, sooner if condition worsens.  Patient was given instructions and counseling regarding his condition or for health maintenance advice. Please see specific information pulled into the AVS if appropriate.

## 2025-07-29 LAB — RPR SER QL: NORMAL

## 2025-07-30 LAB
HSV1 IGG SERPL QL IA: REACTIVE
HSV2 IGG SERPL QL IA: REACTIVE

## 2025-07-31 ENCOUNTER — OFFICE VISIT (OUTPATIENT)
Dept: FAMILY MEDICINE CLINIC | Age: 75
End: 2025-07-31
Payer: MEDICARE

## 2025-07-31 VITALS
SYSTOLIC BLOOD PRESSURE: 133 MMHG | HEIGHT: 70 IN | HEART RATE: 71 BPM | TEMPERATURE: 98 F | RESPIRATION RATE: 20 BRPM | OXYGEN SATURATION: 98 % | WEIGHT: 222 LBS | DIASTOLIC BLOOD PRESSURE: 83 MMHG | BODY MASS INDEX: 31.78 KG/M2

## 2025-07-31 DIAGNOSIS — R89.4 HERPES SIMPLEX ANTIBODY POSITIVE: Primary | ICD-10-CM

## 2025-07-31 PROCEDURE — 1160F RVW MEDS BY RX/DR IN RCRD: CPT | Performed by: STUDENT IN AN ORGANIZED HEALTH CARE EDUCATION/TRAINING PROGRAM

## 2025-07-31 PROCEDURE — 3075F SYST BP GE 130 - 139MM HG: CPT | Performed by: STUDENT IN AN ORGANIZED HEALTH CARE EDUCATION/TRAINING PROGRAM

## 2025-07-31 PROCEDURE — 1125F AMNT PAIN NOTED PAIN PRSNT: CPT | Performed by: STUDENT IN AN ORGANIZED HEALTH CARE EDUCATION/TRAINING PROGRAM

## 2025-07-31 PROCEDURE — 1159F MED LIST DOCD IN RCRD: CPT | Performed by: STUDENT IN AN ORGANIZED HEALTH CARE EDUCATION/TRAINING PROGRAM

## 2025-07-31 PROCEDURE — 99213 OFFICE O/P EST LOW 20 MIN: CPT | Performed by: STUDENT IN AN ORGANIZED HEALTH CARE EDUCATION/TRAINING PROGRAM

## 2025-07-31 PROCEDURE — 3079F DIAST BP 80-89 MM HG: CPT | Performed by: STUDENT IN AN ORGANIZED HEALTH CARE EDUCATION/TRAINING PROGRAM

## 2025-08-01 RX ORDER — ATORVASTATIN CALCIUM 40 MG/1
40 TABLET, FILM COATED ORAL NIGHTLY
Qty: 90 TABLET | Refills: 3 | Status: SHIPPED | OUTPATIENT
Start: 2025-08-01

## 2025-08-07 ENCOUNTER — OFFICE VISIT (OUTPATIENT)
Age: 75
End: 2025-08-07
Payer: MEDICARE

## 2025-08-07 VITALS
HEIGHT: 70 IN | WEIGHT: 222 LBS | SYSTOLIC BLOOD PRESSURE: 130 MMHG | DIASTOLIC BLOOD PRESSURE: 78 MMHG | BODY MASS INDEX: 31.78 KG/M2 | HEART RATE: 63 BPM

## 2025-08-07 DIAGNOSIS — I25.118 CORONARY ARTERY DISEASE OF NATIVE ARTERY OF NATIVE HEART WITH STABLE ANGINA PECTORIS: Primary | ICD-10-CM

## 2025-08-07 DIAGNOSIS — E78.2 MIXED HYPERLIPIDEMIA: ICD-10-CM

## 2025-08-07 DIAGNOSIS — I10 ESSENTIAL HYPERTENSION: ICD-10-CM

## 2025-08-07 PROCEDURE — 1160F RVW MEDS BY RX/DR IN RCRD: CPT | Performed by: INTERNAL MEDICINE

## 2025-08-07 PROCEDURE — 1159F MED LIST DOCD IN RCRD: CPT | Performed by: INTERNAL MEDICINE

## 2025-08-07 PROCEDURE — 99214 OFFICE O/P EST MOD 30 MIN: CPT | Performed by: INTERNAL MEDICINE

## 2025-08-07 PROCEDURE — 93000 ELECTROCARDIOGRAM COMPLETE: CPT | Performed by: INTERNAL MEDICINE

## 2025-08-07 PROCEDURE — 3078F DIAST BP <80 MM HG: CPT | Performed by: INTERNAL MEDICINE

## 2025-08-07 PROCEDURE — 3075F SYST BP GE 130 - 139MM HG: CPT | Performed by: INTERNAL MEDICINE

## 2025-08-11 ENCOUNTER — OFFICE VISIT (OUTPATIENT)
Dept: FAMILY MEDICINE CLINIC | Age: 75
End: 2025-08-11
Payer: MEDICARE

## 2025-08-11 VITALS
DIASTOLIC BLOOD PRESSURE: 88 MMHG | OXYGEN SATURATION: 97 % | BODY MASS INDEX: 31.87 KG/M2 | TEMPERATURE: 98.4 F | HEIGHT: 70 IN | WEIGHT: 222.6 LBS | HEART RATE: 87 BPM | SYSTOLIC BLOOD PRESSURE: 149 MMHG

## 2025-08-11 DIAGNOSIS — N41.1 CHRONIC PROSTATITIS: Primary | ICD-10-CM

## 2025-08-11 DIAGNOSIS — I10 ESSENTIAL HYPERTENSION: ICD-10-CM

## 2025-08-11 DIAGNOSIS — K21.9 GERD WITHOUT ESOPHAGITIS: ICD-10-CM

## 2025-08-11 PROBLEM — R10.11 RUQ PAIN: Status: RESOLVED | Noted: 2025-07-03 | Resolved: 2025-08-11

## 2025-08-11 PROBLEM — Z80.0 FAMILY HX OF COLON CANCER: Status: RESOLVED | Noted: 2025-07-03 | Resolved: 2025-08-11

## 2025-08-11 PROBLEM — B35.1 OM (ONYCHOMYCOSIS): Status: RESOLVED | Noted: 2025-06-14 | Resolved: 2025-08-11

## 2025-08-11 PROBLEM — S92.014A CLOSED NONDISPLACED FRACTURE OF BODY OF RIGHT CALCANEUS: Status: RESOLVED | Noted: 2025-06-14 | Resolved: 2025-08-11

## 2025-08-11 PROBLEM — S90.851A FOREIGN BODY IN RIGHT FOOT: Status: RESOLVED | Noted: 2025-06-14 | Resolved: 2025-08-11

## 2025-08-11 PROBLEM — R10.12 LUQ PAIN: Status: RESOLVED | Noted: 2025-07-03 | Resolved: 2025-08-11

## 2025-08-11 RX ORDER — DOXYCYCLINE HYCLATE 100 MG
100 TABLET ORAL 2 TIMES DAILY
COMMUNITY

## (undated) DEVICE — KT MANIFLD CARDIAC

## (undated) DEVICE — SNAR POLYP SENSATION STDOVL 27 240 BX40

## (undated) DEVICE — PK CATH CARD 40

## (undated) DEVICE — RADIFOCUS OPTITORQUE ANGIOGRAPHIC CATHETER: Brand: OPTITORQUE

## (undated) DEVICE — TR BAND RADIAL ARTERY COMPRESSION DEVICE: Brand: TR BAND

## (undated) DEVICE — GLIDESHEATH SLENDER STAINLESS STEEL KIT: Brand: GLIDESHEATH SLENDER

## (undated) DEVICE — KT ORCA ORCAPOD DISP STRL

## (undated) DEVICE — BLCK/BITE BLOX W/DENTL/RIM W/STRAP 54F

## (undated) DEVICE — LN SMPL CO2 SHTRM SD STREAM W/M LUER

## (undated) DEVICE — SINGLE-USE BIOPSY FORCEPS: Brand: RADIAL JAW 4

## (undated) DEVICE — SENSR O2 OXIMAX FNGR A/ 18IN NONSTR

## (undated) DEVICE — GW EMR FIX EXCHG J STD .035 3MM 260CM

## (undated) DEVICE — ADAPT CLN BIOGUARD AIR/H2O DISP

## (undated) DEVICE — CANN O2 ETCO2 FITS ALL CONN CO2 SMPL A/ 7IN DISP LF

## (undated) DEVICE — TUBING, SUCTION, 1/4" X 10', STRAIGHT: Brand: MEDLINE

## (undated) DEVICE — CATH DIAG IMPULSE FR4 5F 100CM